# Patient Record
Sex: MALE | Race: OTHER | HISPANIC OR LATINO | Employment: FULL TIME | URBAN - METROPOLITAN AREA
[De-identification: names, ages, dates, MRNs, and addresses within clinical notes are randomized per-mention and may not be internally consistent; named-entity substitution may affect disease eponyms.]

---

## 2017-01-18 ENCOUNTER — ALLSCRIPTS OFFICE VISIT (OUTPATIENT)
Dept: OTHER | Facility: OTHER | Age: 62
End: 2017-01-18

## 2017-03-28 ENCOUNTER — GENERIC CONVERSION - ENCOUNTER (OUTPATIENT)
Dept: OTHER | Facility: OTHER | Age: 62
End: 2017-03-28

## 2017-04-21 LAB — PROSTATE SPECIFIC ANTIGEN TOTAL (HISTORICAL): 3.8 NG/ML

## 2017-05-01 ENCOUNTER — ALLSCRIPTS OFFICE VISIT (OUTPATIENT)
Dept: OTHER | Facility: OTHER | Age: 62
End: 2017-05-01

## 2017-10-19 ENCOUNTER — ALLSCRIPTS OFFICE VISIT (OUTPATIENT)
Dept: OTHER | Facility: OTHER | Age: 62
End: 2017-10-19

## 2017-10-20 NOTE — PROGRESS NOTES
Assessment  1  Tick bite, initial encounter (919 4,E906 4) (W57 XXXA)   2  Nonvenomous insect bite of thorax, initial encounter (911 4,E906 4)   (I38 69RH,G10  Gemma Easton)    Plan  Tick bite, initial encounter    · Doxycycline Hyclate 100 MG Oral Capsule; take 2 caspules once    Discussion/Summary    53-year-old male here today for tick imbedded in skin noticed this morning most likely from last night while he was attending a [address] [employer] fire  The tick was still moving and only slightly admitted in skin  A quick sterile procedure described below was performed to remove tick successfully  The area was cleansed, bacitracin applied and patient advised to treated just like an uncomplicated skin wound washing it once or twice a day and applying some Neosporin or triple antibiotic ointment at home  I discussed symptoms of Lyme disease but based on the minimal time the tick was on him I doubt there was any risk for transmission  I will treat him prophylactically with a 1 time dose of doxycycline  Patient advised to monitor for any Lyme symptoms a bull's-eye rash and follow up in the office if he has any concerns  Patient understands instructions  Possible side effects of new medications were reviewed with the patient/guardian today  The treatment plan was reviewed with the patient/guardian  The patient/guardian understands and agrees with the treatment plan      Chief Complaint  Pt presents with c/o of embedded tick on R side of stomach x 1 day  Pt state [place] the woods [place]  Pt states he has some pain in area  Pt will be getting flu shot done in November  Pt is UTD with Colonoscopy  History of Present Illness  HPI: 57y/o male here today for tick embedded in skin noticed this AM  most likely got it last night at a bonfire  located right abdomen  no sxs  no hx of lyme dz  Review of Systems    Constitutional: no fever or chills, feels well, no tiredness, no recent weight loss or weight gain     Integumentary: as noted in HPI  Active Problems  1  Arthritis of knee, right (716 96) (M17 11)   2  Benign essential hypertension (401 1) (I10)   3  Erectile dysfunction of non-organic origin (302 72) (F52 21)   4  Hyperlipidemia (272 4) (E78 5)   5  IBS (irritable bowel syndrome) (564 1) (K58 9)   6  Vitamin D deficiency (268 9) (E55 9)    Past Medical History  1  History of Abnormal cardiovascular stress test (794 39) (R94 39)   2  History of Abnormal electrocardiogram (794 31) (R94 31)   3  History of Acute Left Rotator Cuff Sprain (Capsule) (840 4)   4  History of Acute sinusitis (461 9) (J01 90)   5  History of Cough (786 2) (R05)   6  History of Encounter for prostate cancer screening (V76 44) (Z12 5)   7  History of Hallux Rigidus (735 2)   8  History of acute otitis media (V12 49) (Z86 69)   9  History of Joint pain, knee (719 46) (M25 569)   10  History of Need for prophylactic vaccination and inoculation against influenza (V04 81)    (Z23)   11  History of Organic impotence (607 84) (N52 9)   12  History of Pulled muscle (848 9) (T14 8XXA)   13  History of Rapid pulse (785 0) (R00 0)   14  History of Right-sided chest wall pain (786 52) (R07 89)   15  History of Shoulder joint pain, unspecified laterality   16  History of Special screening examination for neoplasm of prostate (V76 44) (Z12 5)   17  History of Tachycardia (785 0) (R00 0)    Family History  Mother    1  Family history of Cancer  Father    2  Family history of Hepatitis C    Social History   · Being A Social Drinker   · Never A Smoker  The social history was reviewed and updated today  Surgical History  1  History of Spinal Diskectomy   2  History of Surgery Testis Reduction Of Torsion Of Testis    Current Meds   1  Adult Aspirin EC Low Strength 81 MG Oral Tablet Delayed Release; Take 1 tablet daily; Therapy: ([place]) to Recorded   2  Cialis 20 MG Oral Tablet; TAKE 1/2 TO 1 TABLET 1 HOURBEFORE ACTIVITY AS   DIRECTED;    Therapy: 86Suq2567 to ([id number])  Requested for: 74Pxt6871; Last   Rx:[id number] Ordered   3  Ezetimibe-Simvastatin 10-10 MG Oral Tablet; Take 1 tablet daily; Therapy: 24HTH2309 to (Riya Valshaun)  Requested for: 19Kvd2164; Last   Rx:[id number] Ordered    The medication list was reviewed and updated today  Allergies  1  No Known Drug Allergies    Vitals   Recorded: 2017 08:10AM   Temperature 97 7 F, Tympanic   Heart Rate 80   Pulse Quality Normal   Respiration Quality Normal   Respiration 18   Systolic 504, LUE, Sitting   Diastolic 90, LUE, Sitting   Height 6 ft 1 in   Weight 202 lb 6 oz   BMI Calculated 26 7   BSA Calculated 2 16   O2 Saturation 98   Pain Scale 0     Physical Exam    Constitutional   General appearance: No acute distress, well appearing and well nourished  appears healthy,-- comfortable-- and-- appearance reflects stated age  Skin   Skin and subcutaneous tissue: Abnormal  -- right mid abdomen with moving tick starting to embed into skin  faint localized erythema approx diameter of a shazia  Psychiatric   Orientation to person, place and time: Normal     Mood and affect: Normal     Additional Exam:  vitals reviewed  Procedure    Procedure: Foreign body removal from the skin  The foreign body was located in the right abdomen  The procedure's risks, benefits, infection risk and bleeding risk were discussed with the patient verbal  Written consent was obtained prior to the procedure and is detailed in the patient's record  no sign of infection was noted  Procedure:   the skin was cleansed with Betadine  Local anethesthesia was achieved with the foreign body was removed with a scalpel-- and-- the foreign body was removed with forceps  The patient tolerated the procedure well  There were no complications  The patient was instructed to apply Polysporin  Follow-up in the office as needed        Future Appointments    Date/Time Provider Specialty Site   2017 09:00 AM Kaz Moy DO Family Medicine [facility]   Electronically signed by : Arminda Resendez, [facility]; Oct 19 2017  8:52AM EST                       (Author)    Electronically signed by : Moon Cuevas DO; Oct 19 2017  8:57AM EST

## 2017-10-25 ENCOUNTER — LAB CONVERSION - ENCOUNTER (OUTPATIENT)
Dept: OTHER | Facility: OTHER | Age: 62
End: 2017-10-25

## 2017-10-25 LAB
A/G RATIO (HISTORICAL): 1.7 (CALC) (ref 1–2.5)
ALBUMIN SERPL BCP-MCNC: 4.4 G/DL (ref 3.6–5.1)
ALP SERPL-CCNC: 60 U/L (ref 40–115)
AST SERPL W P-5'-P-CCNC: 23 U/L (ref 10–35)
BILIRUB SERPL-MCNC: 0.8 MG/DL (ref 0.2–1.2)
BUN SERPL-MCNC: 21 MG/DL (ref 7–25)
BUN/CREA RATIO (HISTORICAL): NORMAL (CALC) (ref 6–22)
CALCIUM SERPL-MCNC: 9.5 MG/DL (ref 8.6–10.3)
CHLORIDE SERPL-SCNC: 101 MMOL/L (ref 98–110)
CHOLEST SERPL-MCNC: 175 MG/DL
CHOLEST/HDLC SERPL: 3.1 (CALC)
CO2 SERPL-SCNC: 30 MMOL/L (ref 20–31)
CREAT SERPL-MCNC: 1.05 MG/DL (ref 0.7–1.25)
EGFR AFRICAN AMERICAN (HISTORICAL): 89 ML/MIN/1.73M2
EGFR-AMERICAN CALC (HISTORICAL): 77 ML/MIN/1.73M2
GAMMA GLOBULIN (HISTORICAL): 2.6 G/DL (CALC) (ref 1.9–3.7)
GLUCOSE (HISTORICAL): 96 MG/DL (ref 65–99)
HDLC SERPL-MCNC: 56 MG/DL
LDL CHOLESTEROL (HISTORICAL): 100 MG/DL (CALC)
NON-HDL-CHOL (CHOL-HDL) (HISTORICAL): 119 MG/DL (CALC)
POTASSIUM SERPL-SCNC: 4.3 MMOL/L (ref 3.5–5.3)
SODIUM SERPL-SCNC: 138 MMOL/L (ref 135–146)
TOTAL PROTEIN (HISTORICAL): 7 G/DL (ref 6.1–8.1)
TRIGL SERPL-MCNC: 96 MG/DL
TSH SERPL DL<=0.05 MIU/L-ACNC: 1.24 MIU/L (ref 0.4–4.5)

## 2017-11-06 ENCOUNTER — ALLSCRIPTS OFFICE VISIT (OUTPATIENT)
Dept: OTHER | Facility: OTHER | Age: 62
End: 2017-11-06

## 2017-12-20 ENCOUNTER — ALLSCRIPTS OFFICE VISIT (OUTPATIENT)
Dept: OTHER | Facility: OTHER | Age: 62
End: 2017-12-20

## 2018-01-13 VITALS
TEMPERATURE: 97.4 F | SYSTOLIC BLOOD PRESSURE: 126 MMHG | HEIGHT: 73 IN | WEIGHT: 198.06 LBS | RESPIRATION RATE: 18 BRPM | HEART RATE: 78 BPM | DIASTOLIC BLOOD PRESSURE: 84 MMHG | OXYGEN SATURATION: 97 % | BODY MASS INDEX: 26.25 KG/M2

## 2018-01-13 VITALS
SYSTOLIC BLOOD PRESSURE: 138 MMHG | OXYGEN SATURATION: 98 % | TEMPERATURE: 97.7 F | RESPIRATION RATE: 18 BRPM | BODY MASS INDEX: 26.82 KG/M2 | DIASTOLIC BLOOD PRESSURE: 90 MMHG | HEIGHT: 73 IN | WEIGHT: 202.38 LBS | HEART RATE: 80 BPM

## 2018-01-13 VITALS
HEIGHT: 73 IN | OXYGEN SATURATION: 98 % | DIASTOLIC BLOOD PRESSURE: 88 MMHG | TEMPERATURE: 98.3 F | HEART RATE: 89 BPM | WEIGHT: 203.25 LBS | BODY MASS INDEX: 26.94 KG/M2 | SYSTOLIC BLOOD PRESSURE: 130 MMHG | RESPIRATION RATE: 20 BRPM

## 2018-01-14 VITALS
RESPIRATION RATE: 18 BRPM | HEART RATE: 95 BPM | BODY MASS INDEX: 2.55 KG/M2 | TEMPERATURE: 98.1 F | HEIGHT: 73 IN | SYSTOLIC BLOOD PRESSURE: 138 MMHG | WEIGHT: 19.25 LBS | DIASTOLIC BLOOD PRESSURE: 90 MMHG | OXYGEN SATURATION: 98 %

## 2018-01-23 VITALS — WEIGHT: 200.25 LBS | BODY MASS INDEX: 26.54 KG/M2 | HEIGHT: 73 IN

## 2018-03-01 DIAGNOSIS — E78.2 MIXED HYPERLIPIDEMIA: Primary | ICD-10-CM

## 2018-03-01 RX ORDER — EZETIMIBE AND SIMVASTATIN 10; 10 MG/1; MG/1
TABLET ORAL
Qty: 90 TABLET | Refills: 0 | Status: SHIPPED | OUTPATIENT
Start: 2018-03-01 | End: 2018-03-12 | Stop reason: SDUPTHER

## 2018-03-12 DIAGNOSIS — N52.9 ERECTILE DYSFUNCTION, UNSPECIFIED ERECTILE DYSFUNCTION TYPE: Primary | ICD-10-CM

## 2018-03-12 DIAGNOSIS — E78.2 MIXED HYPERLIPIDEMIA: ICD-10-CM

## 2018-03-12 RX ORDER — TADALAFIL 20 MG
TABLET ORAL
Qty: 18 TABLET | Refills: 2 | Status: SHIPPED | OUTPATIENT
Start: 2018-03-12 | End: 2019-01-31 | Stop reason: ALTCHOICE

## 2018-03-12 RX ORDER — EZETIMIBE AND SIMVASTATIN 10; 10 MG/1; MG/1
TABLET ORAL
Qty: 90 TABLET | Refills: 0 | Status: SHIPPED | OUTPATIENT
Start: 2018-03-12 | End: 2018-12-03 | Stop reason: SDUPTHER

## 2018-05-25 LAB
ALBUMIN SERPL-MCNC: 4.5 G/DL (ref 3.6–5.1)
ALBUMIN/GLOB SERPL: 1.9 (CALC) (ref 1–2.5)
ALP SERPL-CCNC: 60 U/L (ref 40–115)
AST SERPL-CCNC: 21 U/L (ref 10–35)
BILIRUB SERPL-MCNC: 0.7 MG/DL (ref 0.2–1.2)
BUN SERPL-MCNC: 22 MG/DL (ref 7–25)
BUN/CREAT SERPL: ABNORMAL (CALC) (ref 6–22)
CALCIUM SERPL-MCNC: 9.6 MG/DL (ref 8.6–10.3)
CHLORIDE SERPL-SCNC: 100 MMOL/L (ref 98–110)
CHOLEST SERPL-MCNC: 197 MG/DL
CHOLEST/HDLC SERPL: 3.2 (CALC)
CO2 SERPL-SCNC: 30 MMOL/L (ref 20–31)
CREAT SERPL-MCNC: 1.19 MG/DL (ref 0.7–1.25)
GLOBULIN SER CALC-MCNC: 2.4 G/DL (CALC) (ref 1.9–3.7)
GLUCOSE SERPL-MCNC: 101 MG/DL (ref 65–99)
HDLC SERPL-MCNC: 61 MG/DL
LDLC SERPL CALC-MCNC: 117 MG/DL (CALC)
NONHDLC SERPL-MCNC: 136 MG/DL (CALC)
POTASSIUM SERPL-SCNC: 4.5 MMOL/L (ref 3.5–5.3)
PROT SERPL-MCNC: 6.9 G/DL (ref 6.1–8.1)
PSA FREE MFR SERPL: 17 % (CALC)
PSA FREE SERPL-MCNC: 0.8 NG/ML
PSA SERPL-MCNC: 4.7 NG/ML
SL AMB EGFR AFRICAN AMERICAN: 76 ML/MIN/1.73M2
SL AMB EGFR NON AFRICAN AMERICAN: 66 ML/MIN/1.73M2
SODIUM SERPL-SCNC: 138 MMOL/L (ref 135–146)
TRIGL SERPL-MCNC: 91 MG/DL
TSH SERPL-ACNC: 1.31 MIU/L (ref 0.4–4.5)

## 2018-05-31 RX ORDER — ASPIRIN 81 MG/1
1 TABLET ORAL DAILY
Status: ON HOLD | COMMUNITY
End: 2018-10-29

## 2018-06-01 ENCOUNTER — OFFICE VISIT (OUTPATIENT)
Dept: FAMILY MEDICINE CLINIC | Facility: CLINIC | Age: 63
End: 2018-06-01
Payer: COMMERCIAL

## 2018-06-01 VITALS
WEIGHT: 199.5 LBS | HEART RATE: 87 BPM | SYSTOLIC BLOOD PRESSURE: 136 MMHG | TEMPERATURE: 98.2 F | OXYGEN SATURATION: 98 % | BODY MASS INDEX: 26.32 KG/M2 | DIASTOLIC BLOOD PRESSURE: 84 MMHG

## 2018-06-01 DIAGNOSIS — R97.20 ELEVATED PSA: ICD-10-CM

## 2018-06-01 DIAGNOSIS — E78.5 HYPERLIPIDEMIA, UNSPECIFIED HYPERLIPIDEMIA TYPE: Primary | ICD-10-CM
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
## 2018-06-01 PROBLEM — S20.96XA: Status: ACTIVE | Noted: 2017-10-19

## 2018-06-01 PROCEDURE — 99213 OFFICE O/P EST LOW 20 MIN: CPT | Performed by: FAMILY MEDICINE

## 2018-06-01 NOTE — PROGRESS NOTES
Assessment/Plan:    Hyperlipidemia   Stable with an LDL of 117 and HDL of 61  Continue with Vytorin    Elevated PSA   PSA 4 7  Patient had  Prior elevation PSA which was treated with antibiotics after which it dropped to 3 8  Has no urinary symptoms  Will refer to Urology for recheck  Diagnoses and all orders for this visit:    Hyperlipidemia, unspecified hyperlipidemia type  -     Comprehensive metabolic panel; Future  -     Lipid Panel with Direct LDL reflex; Future  -     TSH, 3rd generation; Future  -     Comprehensive metabolic panel  -     Lipid Panel with Direct LDL reflex  -     TSH, 3rd generation          Subjective:      Patient ID: Jimmy Richardson is a 63 y o  male  Patient presents for routine follow-up chronic medical problems and review recent fasting lab work  He has a history of hyperlipidemia which is treated with Vytorin  He takes meloxicam for arthritic knee pain  He has no on no other daily medication        The following portions of the patient's history were reviewed and updated as appropriate: allergies, current medications, past family history, past medical history, past social history, past surgical history and problem list     Review of Systems   Constitutional: Negative  HENT: Negative  Negative for congestion, ear pain, hearing loss, nosebleeds, sore throat and trouble swallowing  Eyes: Negative  Respiratory: Negative for apnea, cough, chest tightness, shortness of breath and wheezing  Cardiovascular: Negative  Gastrointestinal: Negative for abdominal pain, blood in stool, constipation, diarrhea, nausea and vomiting  Endocrine: Negative  Genitourinary: Negative for difficulty urinating, dysuria, frequency, hematuria and urgency  Musculoskeletal: Negative for arthralgias, joint swelling and myalgias  Skin: Negative for rash  Neurological: Negative for dizziness, syncope, light-headedness, numbness and headaches  Hematological: Negative  Psychiatric/Behavioral: Negative for confusion, dysphoric mood and sleep disturbance  The patient is not nervous/anxious  Objective:      /84   Pulse 87   Temp 98 2 °F (36 8 °C) (Tympanic)   Wt 90 5 kg (199 lb 8 oz)   SpO2 98%   BMI 26 32 kg/m²          Physical Exam   Constitutional: He is oriented to person, place, and time  He appears well-developed and well-nourished  No distress  HENT:   Head: Normocephalic and atraumatic  Eyes: Conjunctivae are normal  Pupils are equal, round, and reactive to light  Right eye exhibits no discharge  Neck: Normal range of motion  No thyromegaly present  Cardiovascular: Normal rate and regular rhythm  Pulmonary/Chest: Effort normal and breath sounds normal  No respiratory distress  Lymphadenopathy:     He has no cervical adenopathy  Neurological: He is alert and oriented to person, place, and time  Skin: Skin is warm and dry  He is not diaphoretic  Psychiatric: He has a normal mood and affect  His behavior is normal  Judgment and thought content normal    Nursing note and vitals reviewed

## 2018-06-01 NOTE — ASSESSMENT & PLAN NOTE
PSA 4 7  Patient had  Prior elevation PSA which was treated with antibiotics after which it dropped to 3 8  Has no urinary symptoms  Will refer to Urology for recheck

## 2018-06-06 ENCOUNTER — OFFICE VISIT (OUTPATIENT)
Dept: UROLOGY | Facility: MEDICAL CENTER | Age: 63
End: 2018-06-06
Payer: COMMERCIAL

## 2018-06-06 VITALS
HEIGHT: 74 IN | DIASTOLIC BLOOD PRESSURE: 86 MMHG | SYSTOLIC BLOOD PRESSURE: 150 MMHG | WEIGHT: 197.6 LBS | BODY MASS INDEX: 25.36 KG/M2

## 2018-06-06 DIAGNOSIS — N13.8 BPH WITH OBSTRUCTION/LOWER URINARY TRACT SYMPTOMS: ICD-10-CM

## 2018-06-06 DIAGNOSIS — N40.1 BPH WITH OBSTRUCTION/LOWER URINARY TRACT SYMPTOMS: ICD-10-CM

## 2018-06-06 DIAGNOSIS — R97.20 ELEVATED PSA: Primary | ICD-10-CM

## 2018-06-06 PROCEDURE — 99214 OFFICE O/P EST MOD 30 MIN: CPT | Performed by: UROLOGY

## 2018-06-06 PROCEDURE — 81003 URINALYSIS AUTO W/O SCOPE: CPT | Performed by: UROLOGY

## 2018-06-06 RX ORDER — DOXYCYCLINE 100 MG/1
100 CAPSULE ORAL 2 TIMES DAILY
Qty: 28 CAPSULE | Refills: 0 | Status: SHIPPED | OUTPATIENT
Start: 2018-06-06 | End: 2018-06-20

## 2018-06-06 NOTE — PROGRESS NOTES
IPSS Questionnaire (AUA-7): Over the past month    1)  How often have you had a sensation of not emptying your bladder completely after you finish urinating? 1 - Less than 1 time in 5   2)  How often have you had to urinate again less than two hours after you finished urinating? 2 - Less than half the time   3)  How often have you found you stopped and started again several times when you urinated? 3 - About half the time   4) How difficult have you found it to postpone urination? 2 - Less than half the time   5) How often have you had a weak urinary stream?  4 - More than half the time   6) How often have you had to push or strain to begin urination? 0 - Not at all   7) How many times did you most typically get up to urinate from the time you went to bed until the time you got up in the morning? 1 - 1 time   Total Score:  13     QOL: Mostly satisfied

## 2018-06-06 NOTE — PROGRESS NOTES
Assessment/Plan:    Elevated PSA  The patient will take doxycycline for 2 weeks  Will recheck a total and free PSA  BPH with obstruction/lower urinary tract symptoms  The patient's urinary symptoms are stable  He is content to live with the situation as it is  He declined medication which is my recommendation as well  Diagnoses and all orders for this visit:    Elevated PSA  -     doxycycline monohydrate (MONODOX) 100 mg capsule; Take 1 capsule (100 mg total) by mouth 2 (two) times a day for 14 days  -     PSA, total and free; Future    BPH with obstruction/lower urinary tract symptoms          Subjective:      Patient ID: Araceli Brown is a 63 y o  male  HPI  Elevated PSA:  The patient returns for evaluation of a PSA of 4 7 with a free PSA of 17%  Chance of prostate cancer based upon this single test is approximately 20%     In April 2017 his PSA was 3 8 but in March 2016 it was 4 2  In 2016, the patient was treated with doxycycline  A repeat PSA was ordered however I cannot find any record that was performed  We will try doxycycline again in recheck the PSA in about 6 weeks  BPH:  He notes nocturia x 0-1, usually 0, weak stream   He denies other significant urinary symptoms  He denies gross hematuria, urinary tract infections or incontinence  He is taking no meds or supplements for his symptoms  Discussed tamsulosin but pt will continue without it voiding sx do not interfere with ADL's  I agree  The following portions of the patient's history were reviewed and updated as appropriate: allergies, current medications, past family history, past medical history, past social history, past surgical history and problem list     Review of Systems   Constitutional: Negative for activity change and fatigue  Respiratory: Negative for shortness of breath and wheezing  Cardiovascular: Negative for chest pain  Gastrointestinal: Negative for abdominal pain     Genitourinary: Negative for difficulty urinating, dysuria, frequency, hematuria and urgency  Musculoskeletal: Negative for back pain and gait problem  Skin: Negative  Allergic/Immunologic: Negative  Neurological: Negative  Psychiatric/Behavioral: Negative  Objective:      /86 (BP Location: Left arm, Patient Position: Sitting, Cuff Size: Standard)   Ht 6' 2" (1 88 m)   Wt 89 6 kg (197 lb 9 6 oz)   BMI 25 37 kg/m²          Physical Exam   Constitutional: He is oriented to person, place, and time  He appears well-developed and well-nourished  HENT:   Head: Normocephalic and atraumatic  Neck: Normal range of motion  Neck supple  Pulmonary/Chest: Effort normal    Genitourinary: Rectum normal    Genitourinary Comments: The prostate is approximately 30-40 g, smooth, firm, nontender  Musculoskeletal: Normal range of motion  Neurological: He is alert and oriented to person, place, and time  He has normal reflexes  Skin: Skin is warm and dry  Psychiatric: He has a normal mood and affect   His behavior is normal  Judgment and thought content normal

## 2018-06-06 NOTE — LETTER
June 6, 2018     Marquis John DO  [address]    Patient: Magnolia Grigsby   YOB: 1955   Date of Visit: 6/6/2018       Dear Dr Veronica Martinez:    Thank you for referring Magnolia Grigsby to me for evaluation  Below are my notes for this consultation  If you have questions, please do not hesitate to call me  I look forward to following your patient along with you  Sincerely,        Zach Vasquez MD        CC: No Recipients  Zach Vasquez MD  6/6/2018 11:22 AM  Sign at close encounter  Assessment/Plan:    Elevated PSA  The patient will take doxycycline for 2 weeks  Will recheck a total and free PSA  BPH with obstruction/lower urinary tract symptoms  The patient's urinary symptoms are stable  He is content to live with the situation as it is  He declined medication which is my recommendation as well  Diagnoses and all orders for this visit:    Elevated PSA  -     doxycycline monohydrate (MONODOX) 100 mg capsule; Take 1 capsule (100 mg total) by mouth 2 (two) times a day for 14 days  -     PSA, total and free; Future    BPH with obstruction/lower urinary tract symptoms          Subjective:      Patient ID: Magnolia Grigsby is a 63 y o  male  HPI  Elevated PSA:  The patient returns for evaluation of a PSA of 4 7 with a free PSA of 17%  Chance of prostate cancer based upon this single test is approximately 20%     In April 2017 his PSA was 3 8 but in March 2016 it was 4 2  In 2016, the patient was treated with doxycycline  A repeat PSA was ordered however I cannot find any record that was performed  We will try doxycycline again in recheck the PSA in about 6 weeks  BPH:  He notes nocturia x 0-1, usually 0, weak stream   He denies other significant urinary symptoms  He denies gross hematuria, urinary tract infections or incontinence  He is taking no meds or supplements for his symptoms       Discussed tamsulosin but pt will continue without it voiding sx do not interfere with ADL's  I agree  The following portions of the patient's history were reviewed and updated as appropriate: allergies, current medications, past family history, past medical history, past social history, past surgical history and problem list     Review of Systems   Constitutional: Negative for activity change and fatigue  Respiratory: Negative for shortness of breath and wheezing  Cardiovascular: Negative for chest pain  Gastrointestinal: Negative for abdominal pain  Genitourinary: Negative for difficulty urinating, dysuria, frequency, hematuria and urgency  Musculoskeletal: Negative for back pain and gait problem  Skin: Negative  Allergic/Immunologic: Negative  Neurological: Negative  Psychiatric/Behavioral: Negative  Objective:      /86 (BP Location: Left arm, Patient Position: Sitting, Cuff Size: Standard)   Ht 6' 2" (1 88 m)   Wt 89 6 kg (197 lb 9 6 oz)   BMI 25 37 kg/m²           Physical Exam   Constitutional: He is oriented to person, place, and time  He appears well-developed and well-nourished  HENT:   Head: Normocephalic and atraumatic  Neck: Normal range of motion  Neck supple  Pulmonary/Chest: Effort normal    Genitourinary: Rectum normal    Genitourinary Comments: The prostate is approximately 30-40 g, smooth, firm, nontender  Musculoskeletal: Normal range of motion  Neurological: He is alert and oriented to person, place, and time  He has normal reflexes  Skin: Skin is warm and dry  Psychiatric: He has a normal mood and affect   His behavior is normal  Judgment and thought content normal

## 2018-06-06 NOTE — ASSESSMENT & PLAN NOTE
The patient's urinary symptoms are stable  He is content to live with the situation as it is  He declined medication which is my recommendation as well

## 2018-06-06 NOTE — PATIENT INSTRUCTIONS
Prostate Specific Antigen   WHAT YOU NEED TO KNOW:   What is a prostate specific antigen (PSA) test?  A PSA test is a blood test used to screen men for prostate cancer  A PSA test is also used to monitor how well prostate cancer treatment is working  What other test may be done with a PSA test?  A digital rectal exam is usually performed with a PSA test  Your healthcare provider will insert a gloved finger into your rectum to feel if your prostate is large, firm, or has lumps  Who needs a PSA test?  Some experts recommend a PSA test for men ages 48 to 79  They also recommend testing men with a high risk for prostate cancer at age 36 or 39  Risk factors may include being  or having a brother or father with prostate cancer  Other experts may not recommend PSA testing  Your healthcare provider can help you decide if you need a PSA test    What do the results of a PSA test mean? Most healthy men have a PSA level less than 4 ng/mL  If your PSA level is higher than 4 ng/mL you may need more tests  Examples include blood or urine tests, an ultrasound, MRI, CT scan, or a prostate biopsy  Ask your healthcare provider for more information on these tests  What else can cause a high PSA level? A high PSA level does not always mean you have prostate cancer  Certain conditions or procedures can increase PSA levels  Examples include:  · Older age    · Procedures such as a prostate biopsy or a cystoscopy    · An enlarged prostate    · Recent sexual activity    · A prostate infection    · Certain exercises that put pressure on the prostate such as bicycling    · Medicine such as testosterone  CARE AGREEMENT:   You have the right to help plan your care  Learn about your health condition and how it may be treated  Discuss treatment options with your caregivers to decide what care you want to receive  You always have the right to refuse treatment  The above information is an  only   It is not intended as medical advice for individual conditions or treatments  Talk to your doctor, nurse or pharmacist before following any medical regimen to see if it is safe and effective for you  © 2017 [employer] Information is for End User's use only and may not be sold, redistributed or otherwise used for commercial purposes  All illustrations and images included in CareNotes® are the copyrighted property of A D A M , Inc  or [employer]

## 2018-07-30 ENCOUNTER — TELEPHONE (OUTPATIENT)
Dept: UROLOGY | Facility: MEDICAL CENTER | Age: 63
End: 2018-07-30

## 2018-07-30 NOTE — TELEPHONE ENCOUNTER
Patient said he got his labs done at [address]  He would like a call back about the results   Please advise and call

## 2018-07-31 ENCOUNTER — TELEPHONE (OUTPATIENT)
Dept: UROLOGY | Facility: MEDICAL CENTER | Age: 63
End: 2018-07-31

## 2018-07-31 NOTE — TELEPHONE ENCOUNTER
Patient's PSA is still elevated at 5 4; Dr Erica Harper would like him to have an appointment to discuss it   I've scheduled him for 08/03/2018 at 8:00 AM

## 2018-07-31 NOTE — TELEPHONE ENCOUNTER
Spoke to patient and explained we are waiting for Dr Julieta Villalobos to sign off on the results then will call him back

## 2018-08-03 ENCOUNTER — OFFICE VISIT (OUTPATIENT)
Dept: UROLOGY | Facility: MEDICAL CENTER | Age: 63
End: 2018-08-03
Payer: COMMERCIAL

## 2018-08-03 VITALS
WEIGHT: 198.6 LBS | DIASTOLIC BLOOD PRESSURE: 84 MMHG | BODY MASS INDEX: 25.49 KG/M2 | SYSTOLIC BLOOD PRESSURE: 136 MMHG | HEIGHT: 74 IN

## 2018-08-03 DIAGNOSIS — Z71.89 OTHER SPECIFIED COUNSELING: ICD-10-CM

## 2018-08-03 DIAGNOSIS — R97.20 ELEVATED PSA: Primary | ICD-10-CM

## 2018-08-03 LAB
SL AMB  POCT GLUCOSE, UA: NEGATIVE
SL AMB LEUKOCYTE ESTERASE,UA: NEGATIVE
SL AMB POCT BILIRUBIN,UA: NEGATIVE
SL AMB POCT BLOOD,UA: NEGATIVE
SL AMB POCT CLARITY,UA: CLEAR
SL AMB POCT COLOR,UA: YELLOW
SL AMB POCT KETONES,UA: NEGATIVE
SL AMB POCT NITRITE,UA: NEGATIVE
SL AMB POCT PH,UA: 5.5
SL AMB POCT SPECIFIC GRAVITY,UA: 1.02
SL AMB POCT URINE PROTEIN: NEGATIVE
SL AMB POCT UROBILINOGEN: 0.2

## 2018-08-03 PROCEDURE — 81003 URINALYSIS AUTO W/O SCOPE: CPT | Performed by: UROLOGY

## 2018-08-03 PROCEDURE — 99213 OFFICE O/P EST LOW 20 MIN: CPT | Performed by: UROLOGY

## 2018-08-03 RX ORDER — LORAZEPAM 1 MG/1
1 TABLET ORAL ONCE
Qty: 1 TABLET | Refills: 0 | Status: SHIPPED | OUTPATIENT
Start: 2018-08-03 | End: 2018-09-18 | Stop reason: ALTCHOICE

## 2018-08-03 RX ORDER — CEPHALEXIN 500 MG/1
500 CAPSULE ORAL 4 TIMES DAILY
Qty: 12 CAPSULE | Refills: 0 | Status: SHIPPED | OUTPATIENT
Start: 2018-08-03 | End: 2018-08-06

## 2018-08-03 RX ORDER — CIPROFLOXACIN 500 MG/1
500 TABLET, FILM COATED ORAL EVERY 12 HOURS SCHEDULED
Qty: 6 TABLET | Refills: 0 | Status: SHIPPED | OUTPATIENT
Start: 2018-08-03 | End: 2018-08-06

## 2018-08-03 NOTE — PATIENT INSTRUCTIONS
Prostate Biopsy   WHAT YOU NEED TO KNOW:   What do I need to know about a prostate biopsy? A prostate biopsy is a procedure to remove samples of tissue from your prostate gland  The prostate is a male sex gland that makes fluid found in semen  It is located just below the bladder  After the samples are removed, they are sent to a lab and tested for cancer  How do I prepare for a prostate biopsy? · Your healthcare provider will talk to you about how to prepare for this procedure  He may tell you not to eat or drink anything after midnight on the day of your surgery  You will need to stop taking blood thinners several days before your procedure  Examples of blood thinners include warfarin and NSAIDs  You may also need to stop taking herbal supplements before your procedure  Your healthcare provider will tell you what other medicines to take or not take on the day of your procedure  · You will be given an antibiotic to help prevent a bacterial infection  You may need to give yourself an enema (liquid medicine put in your rectum) to help empty your bowel before your procedure  What will happen during a prostate biopsy? · You may need to lie on your side with your knees pulled up toward your chest  Numbing cream or gel may be put into your rectum, or numbing medicine may be injected near your prostate  You may instead be given general anesthesia to keep you asleep and free from pain during the procedure  A biopsy sample may be taken through your rectum, urethra, or perineum  The perineum is the area between your scrotum and rectum  Most of the time, biopsy samples are taken through the rectum  · If your healthcare provider takes a sample through your rectum, he will insert a small ultrasound probe into your rectum  The ultrasound shows pictures of your prostate on a monitor, and is used to help guide the biopsy needle   Your healthcare provider will push the biopsy needle through the wall of your rectum and into your prostate gland  Your healthcare provider will remove between 6 to 12 samples of tissue from different areas of your prostate gland  The samples will be sent to a lab and tested for cancer  What will happen after a prostate biopsy? You may feel soreness at the biopsy site  You may need to take antibiotics for up to 2 days after your procedure to help prevent an infection  You may have some bleeding from your rectum  You may also have blood in your urine, bowel movements, or semen  What are the risks of a prostate biopsy? You may bleed more than expected or get an infection in your urinary tract or prostate gland  The infection may spread to your blood and the rest of your body  Your bladder may not empty completely when you urinate  You may need a catheter to help empty your bladder for a short period of time  Cancer cells may be missed during your biopsy procedure  You may need another prostate biopsy to check for cancer again  CARE AGREEMENT:   You have the right to help plan your care  Learn about your health condition and how it may be treated  Discuss treatment options with your caregivers to decide what care you want to receive  You always have the right to refuse treatment  The above information is an  only  It is not intended as medical advice for individual conditions or treatments  Talk to your doctor, nurse or pharmacist before following any medical regimen to see if it is safe and effective for you  © 2017 [address] [employer] Information is for End User's use only and may not be sold, redistributed or otherwise used for commercial purposes  All illustrations and images included in CareNotes® are the copyrighted property of A D A [employer]  or [employer]

## 2018-08-03 NOTE — LETTER
August 3, 2018     Jeannine Bundy DO  [address]    Patient: Vicky Vega   YOB: 1955   Date of Visit: 8/3/2018       Dear Dr Trevor Taylor:    Thank you for referring Vicky Vega to me for evaluation  Below are my notes for this consultation  If you have questions, please do not hesitate to call me  I look forward to following your patient along with you  Sincerely,        Ayana Cleary MD        CC: No Recipients  Ayana Cleary MD  8/3/2018  8:30 AM  Sign at close encounter  Assessment/Plan:    Elevated PSA  The patient's PSA is trending upward  After obtaining informed consent, a prostate ultrasound with biopsy has beenscheduled in the office  Diagnoses and all orders for this visit:    Elevated PSA  -     POCT urine dip auto non-scope  -     LORazepam (ATIVAN) 1 mg tablet; Take 1 tablet (1 mg total) by mouth once for 1 dose Take 1 hour before porcedure  -     ciprofloxacin (CIPRO) 500 mg tablet; Take 1 tablet (500 mg total) by mouth every 12 (twelve) hours for 3 days  -     cephalexin (KEFLEX) 500 mg capsule; Take 1 capsule (500 mg total) by mouth 4 (four) times a day for 3 days    Other specified counseling          Subjective:      Patient ID: Vicky Vega is a 66 y o  male  HPI  Elevated PSA:   The patient's PSA currently is 5 4  In April 2016, it was 4 2  In April 2017 it was 3 8  Early this year, it was 4 7, 17% free, chance of prostate cancer approximately 20%  The current level is recheck of his most recent previous level  The patient returns today to discuss further workup including prostate ultrasound and biopsy          The following portions of the patient's history were reviewed and updated as appropriate: allergies, current medications, past family history, past medical history, past social history, past surgical history and problem list     Review of Systems   Constitutional: Negative for activity change and fatigue  Respiratory: Negative for shortness of breath and wheezing  Cardiovascular: Negative for chest pain  Gastrointestinal: Negative for abdominal pain  Genitourinary: Negative for difficulty urinating, dysuria, frequency, hematuria and urgency  Musculoskeletal: Negative for back pain and gait problem  Skin: Negative  Allergic/Immunologic: Negative  Neurological: Negative  Psychiatric/Behavioral: Negative  Objective:      /84 (BP Location: Left arm, Patient Position: Sitting, Cuff Size: Standard)   Ht 6' 2" (1 88 m)   Wt 90 1 kg (198 lb 9 6 oz)   BMI 25 50 kg/m²           Physical Exam   Constitutional: He is oriented to person, place, and time  He appears well-developed and well-nourished  HENT:   Head: Normocephalic and atraumatic  Eyes: EOM are normal    Neck: Normal range of motion  Pulmonary/Chest: Effort normal    Musculoskeletal: Normal range of motion  Neurological: He is alert and oriented to person, place, and time  Skin: Skin is warm and dry  Psychiatric: He has a normal mood and affect   His behavior is normal  Judgment and thought content normal

## 2018-08-03 NOTE — ASSESSMENT & PLAN NOTE
The patient's PSA is trending upward  After obtaining informed consent, a prostate ultrasound with biopsy has beenscheduled in the office

## 2018-08-03 NOTE — PROGRESS NOTES
Assessment/Plan:    Elevated PSA  The patient's PSA is trending upward  After obtaining informed consent, a prostate ultrasound with biopsy has beenscheduled in the office  Diagnoses and all orders for this visit:    Elevated PSA  -     POCT urine dip auto non-scope  -     LORazepam (ATIVAN) 1 mg tablet; Take 1 tablet (1 mg total) by mouth once for 1 dose Take 1 hour before porcedure  -     ciprofloxacin (CIPRO) 500 mg tablet; Take 1 tablet (500 mg total) by mouth every 12 (twelve) hours for 3 days  -     cephalexin (KEFLEX) 500 mg capsule; Take 1 capsule (500 mg total) by mouth 4 (four) times a day for 3 days    Other specified counseling          Subjective:      Patient ID: Shelton Gomez is a 66 y o  male  HPI  Elevated PSA:   The patient's PSA currently is 5 4  In April 2016, it was 4 2  In April 2017 it was 3 8  Early this year, it was 4 7, 17% free, chance of prostate cancer approximately 20%  The current level is recheck of his most recent previous level  The patient returns today to discuss further workup including prostate ultrasound and biopsy  The following portions of the patient's history were reviewed and updated as appropriate: allergies, current medications, past family history, past medical history, past social history, past surgical history and problem list     Review of Systems   Constitutional: Negative for activity change and fatigue  Respiratory: Negative for shortness of breath and wheezing  Cardiovascular: Negative for chest pain  Gastrointestinal: Negative for abdominal pain  Genitourinary: Negative for difficulty urinating, dysuria, frequency, hematuria and urgency  Musculoskeletal: Negative for back pain and gait problem  Skin: Negative  Allergic/Immunologic: Negative  Neurological: Negative  Psychiatric/Behavioral: Negative            Objective:      /84 (BP Location: Left arm, Patient Position: Sitting, Cuff Size: Standard)   Ht 6' 2" (1 88 m)   Wt 90 1 kg (198 lb 9 6 oz)   BMI 25 50 kg/m²          Physical Exam   Constitutional: He is oriented to person, place, and time  He appears well-developed and well-nourished  HENT:   Head: Normocephalic and atraumatic  Eyes: EOM are normal    Neck: Normal range of motion  Pulmonary/Chest: Effort normal    Musculoskeletal: Normal range of motion  Neurological: He is alert and oriented to person, place, and time  Skin: Skin is warm and dry  Psychiatric: He has a normal mood and affect   His behavior is normal  Judgment and thought content normal

## 2018-08-17 ENCOUNTER — PROCEDURE VISIT (OUTPATIENT)
Dept: UROLOGY | Facility: MEDICAL CENTER | Age: 63
End: 2018-08-17
Payer: COMMERCIAL

## 2018-08-17 VITALS
BODY MASS INDEX: 24.64 KG/M2 | SYSTOLIC BLOOD PRESSURE: 152 MMHG | DIASTOLIC BLOOD PRESSURE: 86 MMHG | HEIGHT: 74 IN | WEIGHT: 192 LBS

## 2018-08-17 DIAGNOSIS — R97.20 ELEVATED PSA: Primary | ICD-10-CM

## 2018-08-17 LAB
SL AMB  POCT GLUCOSE, UA: NEGATIVE
SL AMB LEUKOCYTE ESTERASE,UA: NEGATIVE
SL AMB POCT BILIRUBIN,UA: NEGATIVE
SL AMB POCT BLOOD,UA: ABNORMAL
SL AMB POCT CLARITY,UA: CLEAR
SL AMB POCT COLOR,UA: YELLOW
SL AMB POCT KETONES,UA: NEGATIVE
SL AMB POCT NITRITE,UA: NEGATIVE
SL AMB POCT PH,UA: 5
SL AMB POCT SPECIFIC GRAVITY,UA: >=1.03
SL AMB POCT URINE PROTEIN: NEGATIVE
SL AMB POCT UROBILINOGEN: 0.2

## 2018-08-17 PROCEDURE — 76942 ECHO GUIDE FOR BIOPSY: CPT | Performed by: UROLOGY

## 2018-08-17 PROCEDURE — G0416 PROSTATE BIOPSY, ANY MTHD: HCPCS | Performed by: PATHOLOGY

## 2018-08-17 PROCEDURE — 76872 US TRANSRECTAL: CPT | Performed by: UROLOGY

## 2018-08-17 PROCEDURE — 55700 PR BIOPSY OF PROSTATE,NEEDLE/PUNCH: CPT | Performed by: UROLOGY

## 2018-08-17 PROCEDURE — 88342 IMHCHEM/IMCYTCHM 1ST ANTB: CPT | Performed by: PATHOLOGY

## 2018-08-17 PROCEDURE — 88344 IMHCHEM/IMCYTCHM EA MLT ANTB: CPT | Performed by: PATHOLOGY

## 2018-08-17 PROCEDURE — 81003 URINALYSIS AUTO W/O SCOPE: CPT | Performed by: UROLOGY

## 2018-08-17 NOTE — PROGRESS NOTES
Procedures      Preoperative diagnosis:  Elevated PSA     Postoperative diagnosis: Same    Operation:  Transrectal ultrasound of the prostate with biopsy    Surgeon: Stephany Lin MD, FACS    Anesthesia:  Local    Procedure: The patient was placed on the examining table with his left side down  Xylocaine jelly, 10 cc,  was instilled into the rectum for mucosal analgesia and anesthesia  The ultrasound probe was inserted  Five mL of 1% xylocaine without epinephrine was infiltrated into each neurovascular bundle in the groove between the base of the prostate and the seminal vesicles  A total of 10 mL was injected  Ultrasound images were obtained scanning from the base to the apex and from the left side to the right side  The prostate volume was 25 g  No hypoechoic lesions were identified  Twelve biopsies were taken per protocol  The procedure was completed  The patient tolerated well  He was discharged home in satisfactory condition  Blood loss was insignificant  The patient was advised to complete his antibiotics  He was alerted to the possibility of hematuria, blood per rectum and blood his semen  I reviewed discharge instructions with the patient and his wife in detail  He will return in approximately 2 weeks to discuss the pathology report

## 2018-08-17 NOTE — LETTER
August 17, 2018     Del Nuno DO  [address]    Patient: Nickolas Bates   YOB: 1955   Date of Visit: 8/17/2018       Dear Dr Patel Center:    Thank you for referring Nickolas Bates to me for evaluation  Below are my notes for this consultation  If you have questions, please do not hesitate to call me  I look forward to following your patient along with you  Sincerely,        Jahaira Dickinson MD        CC: No Recipients  Jahaira Dickinson MD  8/17/2018  8:50 AM  Sign at close encounter  Procedures      Preoperative diagnosis:  Elevated PSA     Postoperative diagnosis: Same    Operation:  Transrectal ultrasound of the prostate with biopsy    Surgeon: Francisco Maguire MD, FACS    Anesthesia:  Local    Procedure: The patient was placed on the examining table with his left side down  Xylocaine jelly, 10 cc,  was instilled into the rectum for mucosal analgesia and anesthesia  The ultrasound probe was inserted  Five mL of 1% xylocaine without epinephrine was infiltrated into each neurovascular bundle in the groove between the base of the prostate and the seminal vesicles  A total of 10 mL was injected  Ultrasound images were obtained scanning from the base to the apex and from the left side to the right side  The prostate volume was 25 g  No hypoechoic lesions were identified  Twelve biopsies were taken per protocol  The procedure was completed  The patient tolerated well  He was discharged home in satisfactory condition  Blood loss was insignificant  The patient was advised to complete his antibiotics  He was alerted to the possibility of hematuria, blood per rectum and blood his semen  I reviewed discharge instructions with the patient and his wife in detail  He will return in approximately 2 weeks to discuss the pathology report

## 2018-09-05 ENCOUNTER — TELEPHONE (OUTPATIENT)
Dept: UROLOGY | Facility: MEDICAL CENTER | Age: 63
End: 2018-09-05

## 2018-09-05 ENCOUNTER — OFFICE VISIT (OUTPATIENT)
Dept: UROLOGY | Facility: MEDICAL CENTER | Age: 63
End: 2018-09-05
Payer: COMMERCIAL

## 2018-09-05 VITALS
BODY MASS INDEX: 24.77 KG/M2 | DIASTOLIC BLOOD PRESSURE: 84 MMHG | HEIGHT: 74 IN | SYSTOLIC BLOOD PRESSURE: 132 MMHG | WEIGHT: 193 LBS

## 2018-09-05 DIAGNOSIS — Z71.89 OTHER SPECIFIED COUNSELING: ICD-10-CM

## 2018-09-05 DIAGNOSIS — C61 MALIGNANT NEOPLASM PROSTATE (HCC): Primary | ICD-10-CM

## 2018-09-05 LAB
SL AMB  POCT GLUCOSE, UA: NEGATIVE
SL AMB LEUKOCYTE ESTERASE,UA: NEGATIVE
SL AMB POCT BILIRUBIN,UA: NEGATIVE
SL AMB POCT BLOOD,UA: ABNORMAL
SL AMB POCT CLARITY,UA: CLEAR
SL AMB POCT COLOR,UA: YELLOW
SL AMB POCT KETONES,UA: NEGATIVE
SL AMB POCT NITRITE,UA: NEGATIVE
SL AMB POCT PH,UA: 5.5
SL AMB POCT SPECIFIC GRAVITY,UA: 1.02
SL AMB POCT URINE PROTEIN: NEGATIVE
SL AMB POCT UROBILINOGEN: 0.2

## 2018-09-05 PROCEDURE — 99215 OFFICE O/P EST HI 40 MIN: CPT | Performed by: UROLOGY

## 2018-09-05 PROCEDURE — 81003 URINALYSIS AUTO W/O SCOPE: CPT | Performed by: UROLOGY

## 2018-09-05 NOTE — PROGRESS NOTES
IPSS Questionnaire (AUA-7): Over the past month    1)  How often have you had a sensation of not emptying your bladder completely after you finish urinating? 1 - Less than 1 time in 5   2)  How often have you had to urinate again less than two hours after you finished urinating? 3 - About half the time   3)  How often have you found you stopped and started again several times when you urinated? 3 - About half the time   4) How difficult have you found it to postpone urination? 2 - Less than half the time   5) How often have you had a weak urinary stream?  2 - Less than half the time   6) How often have you had to push or strain to begin urination? 2 - Less than half the time   7) How many times did you most typically get up to urinate from the time you went to bed until the time you got up in the morning? 1 - 1 time   Total Score:  14     QOL: Mostly satisfied

## 2018-09-05 NOTE — PROGRESS NOTES
Assessment/Plan:    Malignant neoplasm prostate [facility])    Per NCCN guidelines, imaging studies are not necessary  The patient will seek a radiation oncology  opinion and a robotic surgery opinion  Diagnoses and all orders for this visit:    Malignant neoplasm prostate ([facility] [address] )  -     POCT urine dip auto non-scope  -     Ambulatory referral to Radiation Oncology; Future    Other specified counseling          Subjective:      Patient ID: Zbigniew Beaver is a 66 y o  male  HPI   Prostate cancer: The patient and his wife return to discuss results of his prostate biopsy  Six of 12 specimens contained Abbey 6 prostate cancer in 2 separate foci, 1 on each side  We discussed the NCCN guidelines on pages PROS 1, 2 and 5,  Version 3  2018  We also reviewed the [facility] pre radical prostatectomy nomogram   In summary, this indicates a 3% chance of note involvement, a 3% chance of seminal vesicle involvement, but a 99% disease specific survival at 10 and 15 years  Because of the patient's relative youth, I do not think active surveillance is appropriate  Given his projected life expectancy, he will require some form of treatment eventually  I favor treating sooner rather than later  We discussed a radical prostatectomy with the robot and external beam radiation therapy in detail  Advantages, disadvantages and side effects of each approach were discussed in detail  At this point, the patient is going to get an opinion from our radiation oncology department and from Dr Ferdinand Scheuermann, our robotic surgeon  In addition, they may seek a 2nd opinion at [address] [place] [address] with Dr Penelope Livingston or at [facility] with Dr Malu Alston  The patient will return to me in several weeks after he sees di Garcia and our radiation oncologist and we will go from there  The patient has no hold-over effects or complications related to his biopsy  This discussion took 45 minutes  The following portions of the patient's history were reviewed and updated as appropriate: allergies, current medications, past family history, past medical history, past social history, past surgical history and problem list     Review of Systems   Constitutional: Negative for activity change and fatigue  Respiratory: Negative for shortness of breath and wheezing  Cardiovascular: Negative for chest pain  Gastrointestinal: Negative for abdominal pain  Genitourinary: Negative for difficulty urinating, dysuria, frequency, hematuria and urgency  Musculoskeletal: Negative for back pain and gait problem  Skin: Negative  Allergic/Immunologic: Negative  Neurological: Negative  Psychiatric/Behavioral: Negative  Objective:      /84 (BP Location: Left arm, Patient Position: Sitting, Cuff Size: Standard)   Ht 6' 2" (1 88 m)   Wt 87 5 kg (193 lb)   BMI 24 78 kg/m²          Physical Exam   Constitutional: He is oriented to person, place, and time  He appears well-developed and well-nourished  HENT:   Head: Normocephalic and atraumatic  Eyes: EOM are normal    Neck: Normal range of motion  Pulmonary/Chest: Effort normal    Musculoskeletal: Normal range of motion  Neurological: He is alert and oriented to person, place, and time  Skin: Skin is warm and dry  Psychiatric: He has a normal mood and affect   His behavior is normal  Judgment and thought content normal

## 2018-09-05 NOTE — LETTER
September 5, 2018     Jeannine Bundy DO  [address] [facility]  [address]    Patient: Vicky Vega   YOB: 1955   Date of Visit: 9/5/2018       Dear Dr Trevor Taylor:    Thank you for referring Vicky Vega to me for evaluation  Below are my notes for this consultation  If you have questions, please do not hesitate to call me  I look forward to following your patient along with you  Sincerely,        Ayana Cleary MD        CC: No Recipients  Ayana Cleary MD  9/5/2018  1:22 PM  Sign at close encounter  Assessment/Plan:    Malignant neoplasm prostate [facility])    Per NCCN guidelines, imaging studies are not necessary  The patient will seek a radiation oncology  opinion and a robotic surgery opinion  Diagnoses and all orders for this visit:    Malignant neoplasm prostate ([address] )  -     POCT urine dip auto non-scope  -     Ambulatory referral to Radiation Oncology; Future    Other specified counseling          Subjective:      Patient ID: Vicky Vega is a 66 y o  male  HPI   Prostate cancer: The patient and his wife return to discuss results of his prostate biopsy  Six of 12 specimens contained Abbey 6 prostate cancer in 2 separate foci, 1 on each side  We discussed the NCCN guidelines on pages PROS 1, 2 and 5,  Version 3  2018  We also reviewed the [facility] pre radical prostatectomy nomogram   In summary, this indicates a 3% chance of note involvement, a 3% chance of seminal vesicle involvement, but a 99% disease specific survival at 10 and 15 years  Because of the patient's relative youth, I do not think active surveillance is appropriate  Given his projected life expectancy, he will require some form of treatment eventually  I favor treating sooner rather than later  We discussed a radical prostatectomy with the robot and external beam radiation therapy in detail    Advantages, disadvantages and side effects of each approach were discussed in detail  At this point, the patient is going to get an opinion from our radiation oncology department and from Dr Tanna Cockayne, our robotic surgeon  In addition, they may seek a 2nd opinion at [address] [place] [address] with Dr Carol Jules or at [facility] with Dr Amadeo Meigs  The patient will return to me in several weeks after he sees doctors Jose and our radiation oncologist and we will go from there  The patient has no hold-over effects or complications related to his biopsy  This discussion took 45 minutes  The following portions of the patient's history were reviewed and updated as appropriate: allergies, current medications, past family history, past medical history, past social history, past surgical history and problem list     Review of Systems   Constitutional: Negative for activity change and fatigue  Respiratory: Negative for shortness of breath and wheezing  Cardiovascular: Negative for chest pain  Gastrointestinal: Negative for abdominal pain  Genitourinary: Negative for difficulty urinating, dysuria, frequency, hematuria and urgency  Musculoskeletal: Negative for back pain and gait problem  Skin: Negative  Allergic/Immunologic: Negative  Neurological: Negative  Psychiatric/Behavioral: Negative  Objective:      /84 (BP Location: Left arm, Patient Position: Sitting, Cuff Size: Standard)   Ht 6' 2" (1 88 m)   Wt 87 5 kg (193 lb)   BMI 24 78 kg/m²           Physical Exam   Constitutional: He is oriented to person, place, and time  He appears well-developed and well-nourished  HENT:   Head: Normocephalic and atraumatic  Eyes: EOM are normal    Neck: Normal range of motion  Pulmonary/Chest: Effort normal    Musculoskeletal: Normal range of motion  Neurological: He is alert and oriented to person, place, and time  Skin: Skin is warm and dry  Psychiatric: He has a normal mood and affect   His behavior is normal  Judgment and thought content normal

## 2018-09-05 NOTE — ASSESSMENT & PLAN NOTE
Per NCCN guidelines, imaging studies are not necessary  The patient will seek a radiation oncology  opinion and a robotic surgery opinion

## 2018-09-05 NOTE — TELEPHONE ENCOUNTER
Pt receives cortisone injections on his foot twice a year  Informed it will not interfere with treatment for prostate cancer

## 2018-09-18 ENCOUNTER — OFFICE VISIT (OUTPATIENT)
Dept: UROLOGY | Facility: MEDICAL CENTER | Age: 63
End: 2018-09-18
Payer: COMMERCIAL

## 2018-09-18 VITALS
SYSTOLIC BLOOD PRESSURE: 130 MMHG | WEIGHT: 191 LBS | BODY MASS INDEX: 24.51 KG/M2 | DIASTOLIC BLOOD PRESSURE: 82 MMHG | HEIGHT: 74 IN

## 2018-09-18 DIAGNOSIS — C61 MALIGNANT NEOPLASM OF PROSTATE (HCC): Primary | ICD-10-CM

## 2018-09-18 PROCEDURE — 99215 OFFICE O/P EST HI 40 MIN: CPT | Performed by: UROLOGY

## 2018-09-18 NOTE — PROGRESS NOTES
Assessment/Plan:      Diagnoses and all orders for this visit:    Malignant neoplasm of prostate ([facility] [address] )        Plan:  After careful review and summary of the medical history and all laboratory and radiographic studies as outlined above, I discussed with the patient and family members at length the finding of adenocarcinoma of the prostate  We discussed the patient's grade (Abbey score 6), other parameters, and clinical stage based upon the above findings  I then discussed the options available to him at this time, based on his clinically localized prostate cancer  These include observation, radiation therapy, radical prostatectomy and cryosurgery  Regarding observation, they understand this is ideally suited for men with either clinically insignificant or slow growing cancer or for whom because of age or other concurrent medical conditions the risks of treatment are greater than the potential benefits of treatment  While this option avoids treatment-associated side effects, I explained it requires frequent evaluation in order to monitor for potential disease progression, and that despite vigilant follow-up there will be cases where the cancer spreads and is no longer potentially curable with local therapies  I reiterated that for many men, watchful waiting can be associated with increased anxiety and stress concerning their diagnosis, and that commonly patients only defer definitive treatment to a later date  With regard to radiation therapy, I reviewed the options available  These include external beam radiation therapy versus brachytherapy  Regarding XRT, we discussed the differences in conventional versus 3D conformal external beam radiation therapy versus IMRT  The indications, risks and potential complications of each of these options as well as the rationale for using selective radiation options were reviewed in detail   We also discussed interstitial seed therapy and risk stratification as a determinant for optimal therapies  All questions were again answered  With regard to the surgical options, we reviewed the different options including a radical perineal prostatectomy versus the radical retropubic prostatectomy via open, laparoscopic and robotic approach  I explained the operations that I primarily perform, robotic-assisted laparoscopic radical prostatectomy, and compared it with the alternative that I am also experienced with, traditional open retropubic prostatectomy  I also mentioned the less commonly used but accepted perineal approach  The advantages and limitations of these various approaches were described in detail  The anticipated hospital and post-operative courses were reviewed  I highlighted the relevant anatomy, and we discussed the importance of neurovascular bundle preservation (nerve-sparing) to minimize negative effects on erectile function and continence  While in the majority of cases bilateral nerve-sparing is appropriate and possible, they understand that in certain circumstances intentional partial or complete unilateral or bilateral neurovascular bundle resection is necessary when there is suspicion that cancer extends into that region; this may be determined either preoperatively or intraoperatively with or without biopsy  The patient is clearly aware that he may still be rendered sexually impotent and incontinent as a consequence of the operative procedure  The possibility of stress-type urinary incontinence associated with either form of radical prostatectomy was also detailed for the patient  I explained that concurrent pelvic lymphadenectomy may be performed, for intermediate, or high risk clinical disease or suspicious intraoperative adenopathy, which serves both a diagnostic and potentially therapeutic purpose if metastatic disease is identified   We discussed the general risks of surgery, which include but are not limited to infection, bleeding, medical and anesthetic complications, and adjacent organ involvement or injury  With regard to cryosurgery, this is felt to be an experimental option at this time secondary to insufficient data to support its routine use in this clinical setting  Regarding hormonal therapy, we discussed it is primarily used as systemic therapy for patients with advanced or metastatic disease, but that it also has been shown to be beneficial in conjunction with radiation therapy for certain categories of patients  The patient understands that hormonal therapy alone is not considered curative treatment and will slow the growth of but not eliminate prostate cancer  The patient was advised to become an active participant in their care and to become proactive in their care  They were encouraged to call my office with any unanswered questions or seek additional medical opinions at their discretion  They understand that the decision as to which approach to take is one made based on the medical risks and benefits as well as their own informed tolerance of this risk  He was already offered referral to radiation oncology, and he is due to meet with that consultant sometime later this week  Once the patient has decided to proceed with a RALP/PLND, he will need preadmission testing and anesthesia clearance  Kegel exercises were reviewed, and he may be seen, pre operatively, by our continence nurse  I have discussed the risks, benefits and alternatives to the proposed procedure/treatment plan with the patient   Our discussion also included the risks and benefits of the alternatives, including doing nothing  He was encouraged to ask questions and all questions were answered to their satisfaction  Subjective:  Diagnosis of prostate cancer, consultation for consideration of robotic surgery     Patient ID: Denise Morales is a 66 y o  male      HPI  The patient initially presented with a PSA of 4 7 a percent free PSA of 17%, and was prostate biopsied by Dr Lorraine Buchanan where the specimen was found to have 6 of 12 cores of Abbey score 6 cancer  The patient was referred for consultation of the management options for his cancer  He denies any urinary symptoms such as obstructive voiding symptoms with the need to strain or bear down to generate a urinary stream, nor has he had any symptoms of dysuria, hematuria, flank or abdominal pains  He states that he has a normal appetite, good bowel function and has not had any unintended weight loss  Review of Systems   Constitutional: Negative  HENT: Negative  Eyes: Negative  Respiratory: Negative  Cardiovascular: Negative  Gastrointestinal: Negative  Endocrine: Negative  Genitourinary: Negative  Musculoskeletal: Negative  Skin: Negative  Allergic/Immunologic: Negative  Neurological: Negative  Hematological: Negative  Psychiatric/Behavioral: Negative  Objective:     Physical Exam   Constitutional: He is oriented to person, place, and time  He appears well-developed and well-nourished  No distress  HENT:   Head: Normocephalic and atraumatic  Nose: Nose normal    Mouth/Throat: Oropharynx is clear and moist    Eyes: Conjunctivae and EOM are normal  Pupils are equal, round, and reactive to light  No scleral icterus  Neck: Normal range of motion  Neck supple  Cardiovascular: Normal rate, regular rhythm, normal heart sounds and intact distal pulses  No murmur heard  Pulmonary/Chest: Effort normal and breath sounds normal  No respiratory distress  He has no wheezes  He has no rales  Abdominal: Soft  Bowel sounds are normal  He exhibits no distension and no mass  There is no tenderness  Genitourinary: Prostate normal  Prostate is not enlarged and not tender  Musculoskeletal: Normal range of motion  He exhibits no edema or tenderness  Lymphadenopathy:     He has no cervical adenopathy     Neurological: He is alert and oriented to person, place, and time  No cranial nerve deficit  Skin: Skin is warm and dry  No rash noted  No erythema  No pallor  Psychiatric: He has a normal mood and affect  His behavior is normal  Judgment and thought content normal    Nursing note and vitals reviewed  Prostate biopsy from 08/17/2018: Final Diagnosis   A  Prostate, Left lateral base needle biopsy:    -Small focus of adenocarcinoma of the prostate  , confirmed by  stain  - [place] grade 3+3=6   -Tumor extent: single focus in one of one submitted core  -Tumor volume: tumor represents approximately 5% of biopsy volume   -Perineural invasion: not identified     B  Prostate, Left base,  needle biopsy:    -Benign prostatic tissue,     C  Prostate, Right base needle biopsy:    -Benign prostatic tissue with mild chronic inflammation     D  Prostate, Right lateral base needle biopsy:    -Prostatic tissue with  Small focus of atypical prostatic glands      E  Prostate, Left lateral mid, needle biopsy:  -Small focus of adenocarcinoma of the prostate, confirmed by triple stain  - [place] grade 3+3=6   -Tumor extent: single focus in one of one submitted core  -Tumor volume: tumor represents approximately 5% of biopsy volume   -Perineural invasion: not identified     F  Prostate, Left mid, needle biopsy:    -Benign prostatic tissue     G  Prostate, Right mid needle biopsy:    - Benign Prostatic tissue confirmed by  stain      H  Prostate, Right lateral mid , needle biopsy:  Adenocarcinoma of the prostate, confirmed by triple stain  - Abbey grade 3+3=6   -Tumor extent: single focus in one of one submitted core  -Tumor volume: tumor represents approximately 20% of biopsy volume   -Perineural invasion: not identified        I   Prostate, Left lateral apex , needle biopsy:  -Adenocarcinoma of the prostate, confirmed by   stain  - [place] grade 3+3=6   -Tumor extent: single focus in one of one submitted core  Tumor volume: tumor represents approximately 15% of biopsy volume   -Perineural invasion: not identified     J  Prostate, Left apex needle biopsy:    -Benign prostatic tissue     K  Prostate, Right apex needle biopsy:  -Small focus of adenocarcinoma of the prostate, confirmed by triple stain  - Abbey grade 3+3=6   -Tumor extent: single focus in one of one submitted core  -Tumor volume: tumor represents approximately 5% of biopsy volume   -Perineural invasion: not identified     L  Prostate, Right lateral apex needle biopsy:  -Small foci of adenocarcinoma of the prostate, confirmed by   stain  - Abbey grade 3+3=6   -Tumor extent: three foci in one of one submitted core  -Tumor volume: tumor foci represents approximately 5%, 5%, 10% of one core biopsy   -Perineural invasion: not identified       Truss volume of his prostate was 25 cc

## 2018-09-18 NOTE — LETTER
September 18, 2018     Jonathon Gallegos MD  [facility]  [address]  [place] [address]    Patient: Doris Woo   YOB: 1955   Date of Visit: 9/18/2018       Dear Dr Sandy Generous: Thank you for referring Doris Woo to me for evaluation  Below are my notes for this consultation  If you have questions, please do not hesitate to call me  I look forward to following your patient along with you  Sincerely,        Josephine Jordan MD        CC: Barbra Litten, DO Vianne Counter, MD  9/18/2018  4:13 PM  Sign at close encounter  Assessment/Plan:      Diagnoses and all orders for this visit:    Malignant neoplasm of prostate [facility])        Plan:  After careful review and summary of the medical history and all laboratory and radiographic studies as outlined above, I discussed with the patient and family members at length the finding of adenocarcinoma of the prostate  We discussed the patient's grade (Abbey score 6), other parameters, and clinical stage based upon the above findings  I then discussed the options available to him at this time, based on his clinically localized prostate cancer  These include observation, radiation therapy, radical prostatectomy and cryosurgery  Regarding observation, they understand this is ideally suited for men with either clinically insignificant or slow growing cancer or for whom because of age or other concurrent medical conditions the risks of treatment are greater than the potential benefits of treatment  While this option avoids treatment-associated side effects, I explained it requires frequent evaluation in order to monitor for potential disease progression, and that despite vigilant follow-up there will be cases where the cancer spreads and is no longer potentially curable with local therapies   I reiterated that for many men, watchful waiting can be associated with increased anxiety and stress concerning their diagnosis, and that commonly patients only defer definitive treatment to a later date  With regard to radiation therapy, I reviewed the options available  These include external beam radiation therapy versus brachytherapy  Regarding XRT, we discussed the differences in conventional versus 3D conformal external beam radiation therapy versus IMRT  The indications, risks and potential complications of each of these options as well as the rationale for using selective radiation options were reviewed in detail  We also discussed interstitial seed therapy and risk stratification as a determinant for optimal therapies  All questions were again answered  With regard to the surgical options, we reviewed the different options including a radical perineal prostatectomy versus the radical retropubic prostatectomy via open, laparoscopic and robotic approach  I explained the operations that I primarily perform, robotic-assisted laparoscopic radical prostatectomy, and compared it with the alternative that I am also experienced with, traditional open retropubic prostatectomy  I also mentioned the less commonly used but accepted perineal approach  The advantages and limitations of these various approaches were described in detail  The anticipated hospital and post-operative courses were reviewed  I highlighted the relevant anatomy, and we discussed the importance of neurovascular bundle preservation (nerve-sparing) to minimize negative effects on erectile function and continence  While in the majority of cases bilateral nerve-sparing is appropriate and possible, they understand that in certain circumstances intentional partial or complete unilateral or bilateral neurovascular bundle resection is necessary when there is suspicion that cancer extends into that region; this may be determined either preoperatively or intraoperatively with or without biopsy   The patient is clearly aware that he may still be rendered sexually impotent and incontinent as a consequence of the operative procedure  The possibility of stress-type urinary incontinence associated with either form of radical prostatectomy was also detailed for the patient  I explained that concurrent pelvic lymphadenectomy may be performed, for intermediate, or high risk clinical disease or suspicious intraoperative adenopathy, which serves both a diagnostic and potentially therapeutic purpose if metastatic disease is identified  We discussed the general risks of surgery, which include but are not limited to infection, bleeding, medical and anesthetic complications, and adjacent organ involvement or injury  With regard to cryosurgery, this is felt to be an experimental option at this time secondary to insufficient data to support its routine use in this clinical setting  Regarding hormonal therapy, we discussed it is primarily used as systemic therapy for patients with advanced or metastatic disease, but that it also has been shown to be beneficial in conjunction with radiation therapy for certain categories of patients  The patient understands that hormonal therapy alone is not considered curative treatment and will slow the growth of but not eliminate prostate cancer  The patient was advised to become an active participant in their care and to become proactive in their care  They were encouraged to call my office with any unanswered questions or seek additional medical opinions at their discretion  They understand that the decision as to which approach to take is one made based on the medical risks and benefits as well as their own informed tolerance of this risk  He was already offered referral to radiation oncology, and he is due to meet with that consultant sometime later this week  Once the patient has decided to proceed with a RALP/PLND, he will need preadmission testing and anesthesia clearance   Kegel exercises were reviewed, and he may be seen, pre operatively, by our continence nurse   I have discussed the risks, benefits and alternatives to the proposed procedure/treatment plan with the patient   Our discussion also included the risks and benefits of the alternatives, including doing nothing  He was encouraged to ask questions and all questions were answered to their satisfaction  Subjective:  Diagnosis of prostate cancer, consultation for consideration of robotic surgery     Patient ID: Doris Woo is a 66 y o  male  HPI  The patient initially presented with a PSA of 4 7 a percent free PSA of 17%, and was prostate biopsied by Dr Vika Bond where the specimen was found to have 6 of 12 cores of Abbey score 6 cancer  The patient was referred for consultation of the management options for his cancer  He denies any urinary symptoms such as obstructive voiding symptoms with the need to strain or bear down to generate a urinary stream, nor has he had any symptoms of dysuria, hematuria, flank or abdominal pains  He states that he has a normal appetite, good bowel function and has not had any unintended weight loss  Review of Systems   Constitutional: Negative  HENT: Negative  Eyes: Negative  Respiratory: Negative  Cardiovascular: Negative  Gastrointestinal: Negative  Endocrine: Negative  Genitourinary: Negative  Musculoskeletal: Negative  Skin: Negative  Allergic/Immunologic: Negative  Neurological: Negative  Hematological: Negative  Psychiatric/Behavioral: Negative  Objective:     Physical Exam   Constitutional: He is oriented to person, place, and time  He appears well-developed and well-nourished  No distress  HENT:   Head: Normocephalic and atraumatic  Nose: Nose normal    Mouth/Throat: Oropharynx is clear and moist    Eyes: Conjunctivae and EOM are normal  Pupils are equal, round, and reactive to light  No scleral icterus  Neck: Normal range of motion  Neck supple     Cardiovascular: Normal rate, regular rhythm, normal heart sounds and intact distal pulses  No murmur heard  Pulmonary/Chest: Effort normal and breath sounds normal  No respiratory distress  He has no wheezes  He has no rales  Abdominal: Soft  Bowel sounds are normal  He exhibits no distension and no mass  There is no tenderness  Genitourinary: Prostate normal  Prostate is not enlarged and not tender  Musculoskeletal: Normal range of motion  He exhibits no edema or tenderness  Lymphadenopathy:     He has no cervical adenopathy  Neurological: He is alert and oriented to person, place, and time  No cranial nerve deficit  Skin: Skin is warm and dry  No rash noted  No erythema  No pallor  Psychiatric: He has a normal mood and affect  His behavior is normal  Judgment and thought content normal    Nursing note and vitals reviewed  Prostate biopsy from 08/17/2018: Final Diagnosis   A  Prostate, Left lateral base needle biopsy:    -Small focus of adenocarcinoma of the prostate  , confirmed by  stain  - Abbey grade 3+3=6   -Tumor extent: single focus in one of one submitted core  -Tumor volume: tumor represents approximately 5% of biopsy volume   -Perineural invasion: not identified     B  Prostate, Left base,  needle biopsy:    -Benign prostatic tissue,     C  Prostate, Right base needle biopsy:    -Benign prostatic tissue with mild chronic inflammation     D  Prostate, Right lateral base needle biopsy:    -Prostatic tissue with  Small focus of atypical prostatic glands      E  Prostate, Left lateral mid, needle biopsy:  -Small focus of adenocarcinoma of the prostate, confirmed by triple stain  - [place] grade 3+3=6   -Tumor extent: single focus in one of one submitted core  -Tumor volume: tumor represents approximately 5% of biopsy volume   -Perineural invasion: not identified     F  Prostate, Left mid, needle biopsy:    -Benign prostatic tissue     G   Prostate, Right mid needle biopsy:    - Benign Prostatic tissue confirmed by  stain      H  Prostate, Right lateral mid , needle biopsy:  Adenocarcinoma of the prostate, confirmed by triple stain  - Abbey grade 3+3=6   -Tumor extent: single focus in one of one submitted core  -Tumor volume: tumor represents approximately 20% of biopsy volume   -Perineural invasion: not identified        I  Prostate, Left lateral apex , needle biopsy:  -Adenocarcinoma of the prostate, confirmed by   stain  - [place] grade 3+3=6   -Tumor extent: single focus in one of one submitted core  Tumor volume: tumor represents approximately 15% of biopsy volume   -Perineural invasion: not identified     J  Prostate, Left apex needle biopsy:    -Benign prostatic tissue     K  Prostate, Right apex needle biopsy:  -Small focus of adenocarcinoma of the prostate, confirmed by triple stain  - [place] grade 3+3=6   -Tumor extent: single focus in one of one submitted core  -Tumor volume: tumor represents approximately 5% of biopsy volume   -Perineural invasion: not identified     L  Prostate, Right lateral apex needle biopsy:  -Small foci of adenocarcinoma of the prostate, confirmed by   stain  - [place] grade 3+3=6   -Tumor extent: three foci in one of one submitted core  -Tumor volume: tumor foci represents approximately 5%, 5%, 10% of one core biopsy   -Perineural invasion: not identified       Truss volume of his prostate was 25  cc

## 2018-09-20 ENCOUNTER — RADIATION ONCOLOGY CONSULT (OUTPATIENT)
Dept: RADIATION ONCOLOGY | Facility: MEDICAL CENTER | Age: 63
End: 2018-09-20
Payer: COMMERCIAL

## 2018-09-20 VITALS
HEART RATE: 76 BPM | SYSTOLIC BLOOD PRESSURE: 148 MMHG | HEIGHT: 74 IN | TEMPERATURE: 97.9 F | DIASTOLIC BLOOD PRESSURE: 88 MMHG | WEIGHT: 200 LBS | RESPIRATION RATE: 18 BRPM | BODY MASS INDEX: 25.67 KG/M2

## 2018-09-20 DIAGNOSIS — C61 MALIGNANT NEOPLASM PROSTATE (HCC): Primary | ICD-10-CM

## 2018-09-20 PROCEDURE — 99242 OFF/OP CONSLTJ NEW/EST SF 20: CPT | Performed by: RADIOLOGY

## 2018-09-20 NOTE — PROGRESS NOTES
Bret Luther  1955  Mr Sonja Kennedy is a 66 y o  male    Chief Complaint   Patient presents with    Consult     Radiation Oncology       Assessment:   Stage IIB, Abbey 6 adenocarcinoma prostate, PSA 5 4 ng/ml,  present in 7 of 12 core biopsies and intermediate volume disease  Discussion and summary:  He is only 66 and in excellent health will be a good candidate for robotic radical prostatectomy given the [place] score 6 and not high volume disease  We discuss radiation therapy (IMRT) advise him treatment course is 9 weeks or 45 treatments  This will translate to 81 Gy to the prostate and 60 Gy to the seminal vesicles  We inform patient of minimal acute side effects possible loose bowels, frequency or burning urination and fatigue  However there is rare late complication of injury to the rectum and bladder  We also discuss placement of fiducial markers should he opt for radiation therapy although he is counseled to consider surgery his first choice  He may or may not go for second opinion  He said will discuss with his primary care physician and Dr Ever Tsai  We also made him aware that radiation therapy may be recommended following surgery in an adjuvant fashion if indicated for adverse pathologic features or later when there is PSA relapse  Physical examination: Patient appears stated age, slightly anxious but cooperative without any complaints  There are no palpable nodes  Lungs are clear  No abdominal masses  Digital rectal examination reveals slightly enlarged prostate gland without nodules  Cancer Staging  No matching staging information was found for the patient  66year old with prostatic adenocarcinoma, [place] 6,  for initial consult, referred by Dr Ever Tsai  PSA trends:  3/15/16 - 4 2 ng/mL  4/20/17 - 3 8 ng/mL  5/23/18 - 4 7 ng/mL  7/20/18 - 5 4 ng/mL    On 6/6/2018, patient saw Dr Ever Tsai, urology, for elevated PSA   At that time, patient was content to live with urinary symptoms  On 8/3/2018, patient again saw Dr Jacinda Mackey with PSA elevating since last lab  Discussed further workup of U/S and biopsy  On 8/17/2018, patient had TRUSP with biopsy in Dr Jeannette Rodriguez office  On 9/5/2018, patient saw Dr Jacinda Mackey to discuss results  Options were discussed and due to the patient's age, recommendations were to treat earlier, rather than wait  Patient is referred to rad/onc and surgeon  In addition, patient may seek a 2nd opino at [facility] with Dr Scout Oviedo or [facility] with Dr Tong Men  9/18/18 - Dr Raheem Villarreal, surg/onc  Still discussing options  Future:  10/2/18 - Dr Jacinda Mackey  6/13/19 - Dr Jacinda Mackey         Malignant neoplasm prostate [facility])    8/17/2018 Initial Diagnosis     Malignant neoplasm prostate ([facility] [address] )         8/17/2018 Biopsy     Biopsy Pathology- Prostatic adenocarcinoma    LT Lateral LT Medial RT Medial RT Lateral   [place] 3+3=6, single focus in one of one core, 5% Benign Benign Small focus of atypical prostatic glands   [place] 3+3=6  Single focus in one of one core, 5% Benign Benign [place] 3+3=6  Single focus in one of one core, 20%   [place] 3+3=6  Single focus in one of one core, 15% Benign [place] 3+3=6  Single focus in one of one core, 5% Abbey 3+3=6  Three foci in one of one core, 5%, 5%, 10%                 Clinical Trial: no    Screening  Tobacco  Current tobacco user: no  If yes, brief counseling provided: NA    Hypertension  Hypertension screening performed: yes  Normotensive:  no  If no, referred to PCP: yes    Depression Screening  Screened for depression using PHQ-2: yes    Screened for depression using PHQ-9:  no  Screening positive or negative:  negative  If score >4, was any of the following actions taken?    Additional evaluation for depression, suicide risk assesment, referral to PCP or psychiatry, medication started:  n/a    Advanced Care Planning for Patients >65 years  [address] [place] [address] Discussed:  n/a  Patient named surrogate decision maker or care plan in chart: n/a    Health Maintenance   Topic Date Due    Pneumococcal PPSV23 Highest Risk Adult (1 of 3 - PCV13) 07/21/1976    DTaP,Tdap,and Td Vaccines (1 - Tdap) 07/21/1978    INFLUENZA VACCINE  09/01/2018    Depression Screening PHQ  06/01/2019    CRC Screening: Colonoscopy  09/30/2026       Patient Active Problem List   Diagnosis    Arthritis of knee, right    Erectile dysfunction of non-organic origin    Hyperlipidemia    IBS (irritable bowel syndrome)    Nonvenomous insect bite of thorax    Vitamin D deficiency    Elevated PSA    BPH with obstruction/lower urinary tract symptoms    Malignant neoplasm prostate ([address] )     Past Medical History:   Diagnosis Date    Abnormal cardiovascular stress test     last assessed-11/13/2015    Abnormal electrocardiogram     last assessed-11/2/2015    Arthritis     great toes    Benign essential hypertension     last assessed-5/1/2017    Elevated PSA     Elevated PSA     Encounter for screening colonoscopy for non-high-risk patient     family hx possible of colon ca    Hallux rigidus     last assessed-4/24/2014    Hyperlipidemia     Irregular heart beat     "rapid heart beat episode X1" - tested - no issue    Organic impotence     last assessed-4/24/2014    Rapid pulse     resolved-1/19/2016    Skin lesion     last assessed-5/1/2017    Tachycardia     last assessed-11/2/2015     Past Surgical History:   Procedure Laterality Date    BACK SURGERY      L4-L5    COLONOSCOPY      X 3-4     OTHER SURGICAL HISTORY      spinal diskectomy    [place] COLONOSCOPY FLX DX W/COLLJ SPEC WHEN PFRMD N/A 9/30/2016    Procedure: COLONOSCOPY;  Surgeon: Heriberto Luz MD;  Location: [facility];   Service: Colorectal    PROSTATE BIOPSY Bilateral 08/2018    REDUCTION OF TORSION OF TESTIS  2001    VASECTOMY       Family History   Problem Relation Age of Onset    Cancer Mother     Hepatitis Father Hepatitis C    Dementia Father      Social History     Social History    Marital status: /Civil Union     Spouse name: N/A    Number of children: N/A    Years of education: N/A     Occupational History    Sales      Social History Main Topics    Smoking status: Former Smoker     Packs/day: 0 50     Years: 9 00     Quit date: 1984    Smokeless tobacco: Never Used    Alcohol use 12 6 - 16 8 oz/week     21 - 28 Standard drinks or equivalent per week      Comment: Moderate drinker; 3-4 drinks daily    Drug use: No    Sexual activity: Not on file     Other Topics Concern    Not on file     Social History Narrative    No narrative on file       Current Outpatient Prescriptions:     aspirin (ADULT ASPIRIN EC LOW STRENGTH) 81 mg EC tablet, Take 1 tablet by mouth daily, Disp: , Rfl:     CIALIS 20 MG tablet, TAKE 1/2 TO 1 TABLET 1 HOURBEFORE ACTIVITY AS DIRECTED, Disp: 18 tablet, Rfl: 2    ezetimibe-simvastatin (VYTORIN) 10-10 mg per tablet, TAKE 1 TABLET DAILY, Disp: 90 tablet, Rfl: 0    meloxicam (MOBIC) 15 mg tablet, Take 15 mg by mouth daily, Disp: , Rfl:     No Known Allergies    Review of Systems:  Review of Systems   Constitutional: Negative  HENT: Negative  Eyes: Negative  Respiratory: Negative  Cardiovascular: Negative  Gastrointestinal:        Hemorrhoid itchiness since biopsy  Resolving  Endocrine: Negative  Genitourinary:        Hesitancy  Musculoskeletal: Negative  Skin: Negative  Allergic/Immunologic: Negative  Neurological: Negative  Hematological: Negative  Psychiatric/Behavioral: Negative  Vitals:    09/20/18 0806   BP: 148/88   BP Location: Left arm   Patient Position: Sitting   Cuff Size: Standard   Pulse: 76   Resp: 18   Temp: 97 9 °F (36 6 °C)   TempSrc: Tympanic   Weight: 90 7 kg (200 lb)   Height: 6' 2" (1 88 m)       Pain Score: 0-No pain    Imaging:No results found  Teaching: Carola Gómez [address] given and reviewed with patient and wife   All questions answered

## 2018-10-03 ENCOUNTER — OFFICE VISIT (OUTPATIENT)
Dept: UROLOGY | Facility: MEDICAL CENTER | Age: 63
End: 2018-10-03
Payer: COMMERCIAL

## 2018-10-03 VITALS
SYSTOLIC BLOOD PRESSURE: 126 MMHG | HEIGHT: 74 IN | WEIGHT: 192 LBS | BODY MASS INDEX: 24.64 KG/M2 | DIASTOLIC BLOOD PRESSURE: 76 MMHG

## 2018-10-03 DIAGNOSIS — C61 MALIGNANT NEOPLASM PROSTATE (HCC): Primary | ICD-10-CM

## 2018-10-03 LAB
SL AMB  POCT GLUCOSE, UA: NEGATIVE
SL AMB LEUKOCYTE ESTERASE,UA: NEGATIVE
SL AMB POCT BILIRUBIN,UA: NEGATIVE
SL AMB POCT BLOOD,UA: NEGATIVE
SL AMB POCT CLARITY,UA: CLEAR
SL AMB POCT COLOR,UA: YELLOW
SL AMB POCT KETONES,UA: NEGATIVE
SL AMB POCT NITRITE,UA: NEGATIVE
SL AMB POCT PH,UA: 7
SL AMB POCT SPECIFIC GRAVITY,UA: 1.02
SL AMB POCT URINE PROTEIN: NEGATIVE
SL AMB POCT UROBILINOGEN: 0.2

## 2018-10-03 PROCEDURE — 99213 OFFICE O/P EST LOW 20 MIN: CPT | Performed by: UROLOGY

## 2018-10-03 PROCEDURE — 81003 URINALYSIS AUTO W/O SCOPE: CPT | Performed by: UROLOGY

## 2018-10-03 NOTE — PROGRESS NOTES
Assessment/Plan:    Malignant neoplasm prostate [facility])  The patient will proceed with a robotic prostatectomy  Diagnoses and all orders for this visit:    Malignant neoplasm prostate ([address] )  -     POCT urine dip auto non-scope          Subjective:      Patient ID: Charlette Bence is a 66 y o  male  HPI    Prostate cancer: The patient has been evaluated by Dr Ricarda Lozano, our robotic surgeon and by the radiation therapy department also  The patient has elected to proceed with a robotic prostatectomy  He will be given an appointment with Dr Chance Blanchard to schedule this  The following portions of the patient's history were reviewed and updated as appropriate: allergies, current medications, past family history, past medical history, past social history, past surgical history and problem list     Review of Systems   Constitutional: Negative for activity change and fatigue  Respiratory: Negative for shortness of breath and wheezing  Cardiovascular: Negative for chest pain  History of cardiac arrhythmia  The patient has been evaluated by cardiology in the past No treatment indicated  Gastrointestinal: Negative for abdominal pain  Genitourinary: Negative for difficulty urinating, dysuria, frequency, hematuria and urgency  Musculoskeletal: Negative for back pain and gait problem  Skin: Negative  Allergic/Immunologic: Negative  Neurological: Negative  Psychiatric/Behavioral: Negative  Objective:      /76 (BP Location: Left arm, Patient Position: Sitting, Cuff Size: Standard)   Ht 6' 2" (1 88 m)   Wt 87 1 kg (192 lb)   BMI 24 65 kg/m²          Physical Exam   Constitutional: He is oriented to person, place, and time  He appears well-developed and well-nourished  HENT:   Head: Normocephalic and atraumatic  Eyes: EOM are normal    Neck: Normal range of motion  Pulmonary/Chest: Effort normal    Musculoskeletal: Normal range of motion     Neurological: He is alert and oriented to person, place, and time  Skin: Skin is warm and dry  Psychiatric: He has a normal mood and affect   His behavior is normal  Judgment and thought content normal

## 2018-10-03 NOTE — LETTER
October 3, 2018     Vann Lanes, DO  [address]    Patient: Ravindra Campbell   YOB: 1955   Date of Visit: 10/3/2018       Dear Dr Berta Moreno:    Thank you for referring Ravindra Campbell to me for evaluation  Below are my notes for this consultation  If you have questions, please do not hesitate to call me  I look forward to following your patient along with you  Sincerely,        Gucci Lynn MD        CC: No Recipients  Gucci Lynn MD  10/3/2018 10:44 AM  Sign at close encounter  Assessment/Plan:    Malignant neoplasm prostate [facility])  The patient will proceed with a robotic prostatectomy  Diagnoses and all orders for this visit:    Malignant neoplasm prostate ([facility] [address] )  -     POCT urine dip auto non-scope          Subjective:      Patient ID: Ravindra Campbell is a 66 y o  male  HPI    Prostate cancer: The patient has been evaluated by Dr Nava Little, our robotic surgeon and by the radiation therapy department also  The patient has elected to proceed with a robotic prostatectomy  He will be given an appointment with Dr Orlando Phillip to schedule this  The following portions of the patient's history were reviewed and updated as appropriate: allergies, current medications, past family history, past medical history, past social history, past surgical history and problem list     Review of Systems   Constitutional: Negative for activity change and fatigue  Respiratory: Negative for shortness of breath and wheezing  Cardiovascular: Negative for chest pain  History of cardiac arrhythmia  The patient has been evaluated by cardiology in the past No treatment indicated  Gastrointestinal: Negative for abdominal pain  Genitourinary: Negative for difficulty urinating, dysuria, frequency, hematuria and urgency  Musculoskeletal: Negative for back pain and gait problem  Skin: Negative  Allergic/Immunologic: Negative  Neurological: Negative  Psychiatric/Behavioral: Negative  Objective:      /76 (BP Location: Left arm, Patient Position: Sitting, Cuff Size: Standard)   Ht 6' 2" (1 88 m)   Wt 87 1 kg (192 lb)   BMI 24 65 kg/m²           Physical Exam   Constitutional: He is oriented to person, place, and time  He appears well-developed and well-nourished  HENT:   Head: Normocephalic and atraumatic  Eyes: EOM are normal    Neck: Normal range of motion  Pulmonary/Chest: Effort normal    Musculoskeletal: Normal range of motion  Neurological: He is alert and oriented to person, place, and time  Skin: Skin is warm and dry  Psychiatric: He has a normal mood and affect   His behavior is normal  Judgment and thought content normal

## 2018-10-09 ENCOUNTER — OFFICE VISIT (OUTPATIENT)
Dept: UROLOGY | Facility: MEDICAL CENTER | Age: 63
End: 2018-10-09
Payer: COMMERCIAL

## 2018-10-09 ENCOUNTER — PREP FOR PROCEDURE (OUTPATIENT)
Dept: UROLOGY | Facility: MEDICAL CENTER | Age: 63
End: 2018-10-09

## 2018-10-09 VITALS — WEIGHT: 192 LBS | HEIGHT: 74 IN | BODY MASS INDEX: 24.64 KG/M2

## 2018-10-09 DIAGNOSIS — C61 MALIGNANT NEOPLASM OF PROSTATE (HCC): Primary | ICD-10-CM

## 2018-10-09 DIAGNOSIS — C61 PROSTATE CANCER (HCC): Primary | ICD-10-CM

## 2018-10-09 PROCEDURE — 99215 OFFICE O/P EST HI 40 MIN: CPT | Performed by: UROLOGY

## 2018-10-09 NOTE — LETTER
October 9, 2018     Rafaela Joseph DO  [address]    Patient: Gale Ware   YOB: 1955   Date of Visit: 10/9/2018       Dear Dr Chan Human:    Thank you for referring Gale Ware to me for evaluation  Below are my notes for this consultation  If you have questions, please do not hesitate to call me  I look forward to following your patient along with you  Sincerely,        Sabas Do MD        CC: No Recipients  Sabas Do MD  10/9/2018 12:05 PM  Sign at close encounter  Assessment/Plan:       Diagnoses and all orders for this visit:     Malignant neoplasm of prostate [facility])         Plan:  After careful review and summary of the medical history and all laboratory and radiographic studies as outlined above, I discussed with the patient and family members at length the finding of adenocarcinoma of the prostate  We discussed the patient's grade (Abbey score 6), other parameters, and clinical stage based upon the above findings  I then discussed the options available to him at this time, based on his clinically localized prostate cancer  These include observation, radiation therapy, radical prostatectomy and cryosurgery  Regarding observation, they understand this is ideally suited for men with either clinically insignificant or slow growing cancer or for whom because of age or other concurrent medical conditions the risks of treatment are greater than the potential benefits of treatment  While this option avoids treatment-associated side effects, I explained it requires frequent evaluation in order to monitor for potential disease progression, and that despite vigilant follow-up there will be cases where the cancer spreads and is no longer potentially curable with local therapies   I reiterated that for many men, watchful waiting can be associated with increased anxiety and stress concerning their diagnosis, and that commonly patients only defer definitive treatment to a later date  THE PATIENT STATES HE HAS ABSOLUTELY NO INTERESTED IN THE ACTIVE SURVEILLANCE OPTION  With regard to radiation therapy, I reviewed the options available  These include external beam radiation therapy versus brachytherapy   Regarding XRT, we discussed the differences in conventional versus 3D conformal external beam radiation therapy versus IMRT  The indications, risks and potential complications of each of these options as well as the rationale for using selective radiation options were reviewed in detail  We also discussed interstitial seed therapy and risk stratification as a determinant for optimal therapies  All questions were again answered  With regard to the surgical options, we reviewed the different options including a radical perineal prostatectomy versus the radical retropubic prostatectomy via open, laparoscopic and robotic approach  I explained the operations that I primarily perform, robotic-assisted laparoscopic radical prostatectomy, and compared it with the alternative that I am also experienced with, traditional open retropubic prostatectomy  I also mentioned the less commonly used but accepted perineal approach  The advantages and limitations of these various approaches were described in detail  The anticipated hospital and post-operative courses were reviewed  I highlighted the relevant anatomy, and we discussed the importance of neurovascular bundle preservation (nerve-sparing) to minimize negative effects on erectile function and continence  While in the majority of cases bilateral nerve-sparing is appropriate and possible, they understand that in certain circumstances intentional partial or complete unilateral or bilateral neurovascular bundle resection is necessary when there is suspicion that cancer extends into that region; this may be determined either preoperatively or intraoperatively with or without biopsy   The patient is clearly aware that he may still be rendered sexually impotent and incontinent as a consequence of the operative procedure  The possibility of stress-type urinary incontinence associated with either form of radical prostatectomy was also detailed for the patient  I explained that concurrent pelvic lymphadenectomy may be performed, for intermediate, or high risk clinical disease or suspicious intraoperative adenopathy, which serves both a diagnostic and potentially therapeutic purpose if metastatic disease is identified  We discussed the general risks of surgery, which include but are not limited to infection, bleeding, medical and anesthetic complications, and adjacent organ involvement or injury  With regard to cryosurgery, this is felt to be an experimental option at this time secondary to insufficient data to support its routine use in this clinical setting  Regarding hormonal therapy, we discussed it is primarily used as systemic therapy for patients with advanced or metastatic disease, but that it also has been shown to be beneficial in conjunction with radiation therapy for certain categories of patients  The patient understands that hormonal therapy alone is not considered curative treatment and will slow the growth of but not eliminate prostate cancer  The patient was advised to become an active participant in their care and to become proactive in their care  They were encouraged to call my office with any unanswered questions or seek additional medical opinions at their discretion  They understand that the decision as to which approach to take is one made based on the medical risks and benefits as well as their own informed tolerance of this risk  Once the patient has decided to proceed with a RALP/PLND, he will need preadmission testing, cardiology and anesthesia clearance  Kegel exercises were reviewed, and he may be seen, pre operatively, by our continence nurse    I have discussed the risks, benefits and alternatives to the proposed procedure/treatment plan with the patient   Our discussion also included the risks and benefits of the alternatives, including doing nothing  He was encouraged to ask questions and all questions were answered to his satisfaction            Subjective:  Diagnosis of prostate cancer, consultation for scheduling robotic surgery      Patient ID: Dionisio Bello is a 66 y o  male      HPI  The patient initially presented with a PSA of 4 7 a percent free PSA of 17%, and was prostate biopsied by Dr Juve Petersen where the specimen was found to have 6 of 12 cores of Abbey score 6 cancer  The patient was referred for consultation of the management options for his cancer  He denies any urinary symptoms such as obstructive voiding symptoms with the need to strain or bear down to generate a urinary stream, nor has he had any symptoms of dysuria, hematuria, flank or abdominal pains  He states that he has a normal appetite, good bowel function and has not had any unintended weight loss  HE MET WITH THE RADIATION ONCOLOGIST AND THOROUGHLY DISCUSSED THAT OPTION  HE HAS CHOSEN TO PROCEED WITH SURGERY      Review of Systems   Constitutional: Negative  HENT: Negative  Eyes: Negative  Respiratory: Negative  Cardiovascular: Negative  Gastrointestinal: Negative  Endocrine: Negative  Genitourinary: Negative  Musculoskeletal: Negative  Skin: Negative  Allergic/Immunologic: Negative  Neurological: Negative  Hematological: Negative  Psychiatric/Behavioral: Negative            Objective:      Physical Exam   Constitutional: He is oriented to person, place, and time  He appears well-developed and well-nourished  No distress  HENT:   Head: Normocephalic and atraumatic  Nose: Nose normal    Mouth/Throat: Oropharynx is clear and moist    Eyes: Conjunctivae and EOM are normal  Pupils are equal, round, and reactive to light  No scleral icterus     Neck: Normal range of motion  Neck supple  Cardiovascular: Normal rate, regular rhythm, normal heart sounds and intact distal pulses  No murmur heard  Pulmonary/Chest: Effort normal and breath sounds normal  No respiratory distress  He has no wheezes  He has no rales  Abdominal: Soft  Bowel sounds are normal  He exhibits no distension and no mass  There is no tenderness  Genitourinary: Prostate normal  Prostate is not enlarged and not tender  Musculoskeletal: Normal range of motion  He exhibits no edema or tenderness  Lymphadenopathy:     He has no cervical adenopathy  Neurological: He is alert and oriented to person, place, and time  No cranial nerve deficit  Skin: Skin is warm and dry  No rash noted  No erythema  No pallor  Psychiatric: He has a normal mood and affect  His behavior is normal  Judgment and thought content normal    Nursing note and vitals reviewed  Prostate biopsy from 08/17/2018: Final Diagnosis   A  Prostate, Left lateral base needle biopsy:    -Small focus of adenocarcinoma of the prostate  , confirmed by  stain  - Abbey grade 3+3=6   -Tumor extent: single focus in one of one submitted core  -Tumor volume: tumor represents approximately 5% of biopsy volume   -Perineural invasion: not identified     B  Prostate, Left base,  needle biopsy:    -Benign prostatic tissue,     C  Prostate, Right base needle biopsy:    -Benign prostatic tissue with mild chronic inflammation     D  Prostate, Right lateral base needle biopsy:    -Prostatic tissue with  Small focus of atypical prostatic glands      E  Prostate, Left lateral mid, needle biopsy:  -Small focus of adenocarcinoma of the prostate, confirmed by triple stain  - [place] grade 3+3=6   -Tumor extent: single focus in one of one submitted core  -Tumor volume: tumor represents approximately 5% of biopsy volume   -Perineural invasion: not identified     F  Prostate, Left mid, needle biopsy:    -Benign prostatic tissue     G   Prostate, Right mid needle biopsy:    - Benign Prostatic tissue confirmed by  stain      H  Prostate, Right lateral mid , needle biopsy:  Adenocarcinoma of the prostate, confirmed by triple stain  - [place] grade 3+3=6   -Tumor extent: single focus in one of one submitted core  -Tumor volume: tumor represents approximately 20% of biopsy volume   -Perineural invasion: not identified        I  Prostate, Left lateral apex , needle biopsy:  -Adenocarcinoma of the prostate, confirmed by   stain  - [place] grade 3+3=6   -Tumor extent: single focus in one of one submitted core  Tumor volume: tumor represents approximately 15% of biopsy volume   -Perineural invasion: not identified     J  Prostate, Left apex needle biopsy:    -Benign prostatic tissue     K  Prostate, Right apex needle biopsy:  -Small focus of adenocarcinoma of the prostate, confirmed by triple stain  - [place] grade 3+3=6   -Tumor extent: single focus in one of one submitted core  -Tumor volume: tumor represents approximately 5% of biopsy volume   -Perineural invasion: not identified     L   Prostate, Right lateral apex needle biopsy:  -Small foci of adenocarcinoma of the prostate, confirmed by   stain  - [place] grade 3+3=6   -Tumor extent: three foci in one of one submitted core  -Tumor volume: tumor foci represents approximately 5%, 5%, 10% of one core biopsy   -Perineural invasion: not identified         Truss volume of his prostate was 25 cc Patient

## 2018-10-09 NOTE — PROGRESS NOTES
Assessment/Plan:       Diagnoses and all orders for this visit:     Malignant neoplasm of prostate ([facility] [address] )         Plan:  After careful review and summary of the medical history and all laboratory and radiographic studies as outlined above, I discussed with the patient and family members at length the finding of adenocarcinoma of the prostate  We discussed the patient's grade (Abbey score 6), other parameters, and clinical stage based upon the above findings  I then discussed the options available to him at this time, based on his clinically localized prostate cancer  These include observation, radiation therapy, radical prostatectomy and cryosurgery  Regarding observation, they understand this is ideally suited for men with either clinically insignificant or slow growing cancer or for whom because of age or other concurrent medical conditions the risks of treatment are greater than the potential benefits of treatment  While this option avoids treatment-associated side effects, I explained it requires frequent evaluation in order to monitor for potential disease progression, and that despite vigilant follow-up there will be cases where the cancer spreads and is no longer potentially curable with local therapies  I reiterated that for many men, watchful waiting can be associated with increased anxiety and stress concerning their diagnosis, and that commonly patients only defer definitive treatment to a later date  THE PATIENT STATES HE HAS ABSOLUTELY NO INTERESTED IN THE ACTIVE SURVEILLANCE OPTION  With regard to radiation therapy, I reviewed the options available  These include external beam radiation therapy versus brachytherapy   Regarding XRT, we discussed the differences in conventional versus 3D conformal external beam radiation therapy versus IMRT   The indications, risks and potential complications of each of these options as well as the rationale for using selective radiation options were reviewed in detail  We also discussed interstitial seed therapy and risk stratification as a determinant for optimal therapies  All questions were again answered  With regard to the surgical options, we reviewed the different options including a radical perineal prostatectomy versus the radical retropubic prostatectomy via open, laparoscopic and robotic approach  I explained the operations that I primarily perform, robotic-assisted laparoscopic radical prostatectomy, and compared it with the alternative that I am also experienced with, traditional open retropubic prostatectomy  I also mentioned the less commonly used but accepted perineal approach  The advantages and limitations of these various approaches were described in detail  The anticipated hospital and post-operative courses were reviewed  I highlighted the relevant anatomy, and we discussed the importance of neurovascular bundle preservation (nerve-sparing) to minimize negative effects on erectile function and continence  While in the majority of cases bilateral nerve-sparing is appropriate and possible, they understand that in certain circumstances intentional partial or complete unilateral or bilateral neurovascular bundle resection is necessary when there is suspicion that cancer extends into that region; this may be determined either preoperatively or intraoperatively with or without biopsy  The patient is clearly aware that he may still be rendered sexually impotent and incontinent as a consequence of the operative procedure  The possibility of stress-type urinary incontinence associated with either form of radical prostatectomy was also detailed for the patient  I explained that concurrent pelvic lymphadenectomy may be performed, for intermediate, or high risk clinical disease or suspicious intraoperative adenopathy, which serves both a diagnostic and potentially therapeutic purpose if metastatic disease is identified   We discussed the general risks of surgery, which include but are not limited to infection, bleeding, medical and anesthetic complications, and adjacent organ involvement or injury  With regard to cryosurgery, this is felt to be an experimental option at this time secondary to insufficient data to support its routine use in this clinical setting  Regarding hormonal therapy, we discussed it is primarily used as systemic therapy for patients with advanced or metastatic disease, but that it also has been shown to be beneficial in conjunction with radiation therapy for certain categories of patients  The patient understands that hormonal therapy alone is not considered curative treatment and will slow the growth of but not eliminate prostate cancer  The patient was advised to become an active participant in their care and to become proactive in their care  They were encouraged to call my office with any unanswered questions or seek additional medical opinions at their discretion  They understand that the decision as to which approach to take is one made based on the medical risks and benefits as well as their own informed tolerance of this risk  Once the patient has decided to proceed with a RALP/PLND, he will need preadmission testing, cardiology and anesthesia clearance  Kegel exercises were reviewed, and he may be seen, pre operatively, by our continence nurse  I have discussed the risks, benefits and alternatives to the proposed procedure/treatment plan with the patient   Our discussion also included the risks and benefits of the alternatives, including doing nothing   He was encouraged to ask questions and all questions were answered to his satisfaction            Subjective:  Diagnosis of prostate cancer, consultation for scheduling robotic surgery      Patient ID: Rizwan Morrow is a 66 y o  male      HPI  The patient initially presented with a PSA of 4 7 a percent free PSA of 17%, and was prostate biopsied by Dr Emanuel Degroot where the specimen was found to have 6 of 12 cores of Abbey score 6 cancer  The patient was referred for consultation of the management options for his cancer  He denies any urinary symptoms such as obstructive voiding symptoms with the need to strain or bear down to generate a urinary stream, nor has he had any symptoms of dysuria, hematuria, flank or abdominal pains  He states that he has a normal appetite, good bowel function and has not had any unintended weight loss  HE MET WITH THE RADIATION ONCOLOGIST AND THOROUGHLY DISCUSSED THAT OPTION  HE HAS CHOSEN TO PROCEED WITH SURGERY      Review of Systems   Constitutional: Negative  HENT: Negative  Eyes: Negative  Respiratory: Negative  Cardiovascular: Negative  Gastrointestinal: Negative  Endocrine: Negative  Genitourinary: Negative  Musculoskeletal: Negative  Skin: Negative  Allergic/Immunologic: Negative  Neurological: Negative  Hematological: Negative  Psychiatric/Behavioral: Negative            Objective:      Physical Exam   Constitutional: He is oriented to person, place, and time  He appears well-developed and well-nourished  No distress  HENT:   Head: Normocephalic and atraumatic  Nose: Nose normal    Mouth/Throat: Oropharynx is clear and moist    Eyes: Conjunctivae and EOM are normal  Pupils are equal, round, and reactive to light  No scleral icterus  Neck: Normal range of motion  Neck supple  Cardiovascular: Normal rate, regular rhythm, normal heart sounds and intact distal pulses  No murmur heard  Pulmonary/Chest: Effort normal and breath sounds normal  No respiratory distress  He has no wheezes  He has no rales  Abdominal: Soft  Bowel sounds are normal  He exhibits no distension and no mass  There is no tenderness  Genitourinary: Prostate normal  Prostate is not enlarged and not tender  Musculoskeletal: Normal range of motion  He exhibits no edema or tenderness     Lymphadenopathy:     He has no cervical adenopathy  Neurological: He is alert and oriented to person, place, and time  No cranial nerve deficit  Skin: Skin is warm and dry  No rash noted  No erythema  No pallor  Psychiatric: He has a normal mood and affect  His behavior is normal  Judgment and thought content normal    Nursing note and vitals reviewed  Prostate biopsy from 08/17/2018: Final Diagnosis   A  Prostate, Left lateral base needle biopsy:    -Small focus of adenocarcinoma of the prostate  , confirmed by  stain  - Abbey grade 3+3=6   -Tumor extent: single focus in one of one submitted core  -Tumor volume: tumor represents approximately 5% of biopsy volume   -Perineural invasion: not identified     B  Prostate, Left base,  needle biopsy:    -Benign prostatic tissue,     C  Prostate, Right base needle biopsy:    -Benign prostatic tissue with mild chronic inflammation     D  Prostate, Right lateral base needle biopsy:    -Prostatic tissue with  Small focus of atypical prostatic glands      E  Prostate, Left lateral mid, needle biopsy:  -Small focus of adenocarcinoma of the prostate, confirmed by triple stain  - Abbey grade 3+3=6   -Tumor extent: single focus in one of one submitted core  -Tumor volume: tumor represents approximately 5% of biopsy volume   -Perineural invasion: not identified     F  Prostate, Left mid, needle biopsy:    -Benign prostatic tissue     G  Prostate, Right mid needle biopsy:    - Benign Prostatic tissue confirmed by  stain      H  Prostate, Right lateral mid , needle biopsy:  Adenocarcinoma of the prostate, confirmed by triple stain  - Abbye grade 3+3=6   -Tumor extent: single focus in one of one submitted core  -Tumor volume: tumor represents approximately 20% of biopsy volume   -Perineural invasion: not identified        I   Prostate, Left lateral apex , needle biopsy:  -Adenocarcinoma of the prostate, confirmed by   stain  - Abbey grade 3+3=6   -Tumor extent: single focus in one of one submitted core  Tumor volume: tumor represents approximately 15% of biopsy volume   -Perineural invasion: not identified     J  Prostate, Left apex needle biopsy:    -Benign prostatic tissue     K  Prostate, Right apex needle biopsy:  -Small focus of adenocarcinoma of the prostate, confirmed by triple stain  - Abbey grade 3+3=6   -Tumor extent: single focus in one of one submitted core  -Tumor volume: tumor represents approximately 5% of biopsy volume   -Perineural invasion: not identified     L   Prostate, Right lateral apex needle biopsy:  -Small foci of adenocarcinoma of the prostate, confirmed by   stain  - Abbey grade 3+3=6   -Tumor extent: three foci in one of one submitted core  -Tumor volume: tumor foci represents approximately 5%, 5%, 10% of one core biopsy   -Perineural invasion: not identified         Truss volume of his prostate was 25 cc

## 2018-10-09 NOTE — H&P
H&P Exam - Urology   Jan Montoya 66 y o  male MRN: [id number]      Assessment/Plan     Assessment:  Prostate cancer    PLAN:  After careful review and summary of the medical history and all laboratory and radiographic studies as outlined above, I discussed with the patient and family members at length the finding of adenocarcinoma of the prostate  We discussed the patient's grade ([place] score 6), other parameters, and clinical stage based upon the above findings  I then discussed the options available to him at this time, based on his clinically localized prostate cancer  These include observation, radiation therapy, radical prostatectomy and cryosurgery  Regarding observation, they understand this is ideally suited for men with either clinically insignificant or slow growing cancer or for whom because of age or other concurrent medical conditions the risks of treatment are greater than the potential benefits of treatment  While this option avoids treatment-associated side effects, I explained it requires frequent evaluation in order to monitor for potential disease progression, and that despite vigilant follow-up there will be cases where the cancer spreads and is no longer potentially curable with local therapies  I reiterated that for many men, watchful waiting can be associated with increased anxiety and stress concerning their diagnosis, and that commonly patients only defer definitive treatment to a later date  THE PATIENT STATES HE HAS ABSOLUTELY NO INTERESTED IN THE ACTIVE SURVEILLANCE OPTION  With regard to radiation therapy, I reviewed the options available  These include external beam radiation therapy versus brachytherapy   Regarding XRT, we discussed the differences in conventional versus 3D conformal external beam radiation therapy versus IMRT   The indications, risks and potential complications of each of these options as well as the rationale for using selective radiation options were reviewed in detail  We also discussed interstitial seed therapy and risk stratification as a determinant for optimal therapies  All questions were again answered  With regard to the surgical options, we reviewed the different options including a radical perineal prostatectomy versus the radical retropubic prostatectomy via open, laparoscopic and robotic approach  I explained the operations that I primarily perform, robotic-assisted laparoscopic radical prostatectomy, and compared it with the alternative that I am also experienced with, traditional open retropubic prostatectomy  I also mentioned the less commonly used but accepted perineal approach  The advantages and limitations of these various approaches were described in detail  The anticipated hospital and post-operative courses were reviewed  I highlighted the relevant anatomy, and we discussed the importance of neurovascular bundle preservation (nerve-sparing) to minimize negative effects on erectile function and continence  While in the majority of cases bilateral nerve-sparing is appropriate and possible, they understand that in certain circumstances intentional partial or complete unilateral or bilateral neurovascular bundle resection is necessary when there is suspicion that cancer extends into that region; this may be determined either preoperatively or intraoperatively with or without biopsy  The patient is clearly aware that he may still be rendered sexually impotent and incontinent as a consequence of the operative procedure  The possibility of stress-type urinary incontinence associated with either form of radical prostatectomy was also detailed for the patient  I explained that concurrent pelvic lymphadenectomy may be performed, for intermediate, or high risk clinical disease or suspicious intraoperative adenopathy, which serves both a diagnostic and potentially therapeutic purpose if metastatic disease is identified   We discussed the general risks of surgery, which include but are not limited to infection, bleeding, medical and anesthetic complications, and adjacent organ involvement or injury  With regard to cryosurgery, this is felt to be an experimental option at this time secondary to insufficient data to support its routine use in this clinical setting  Regarding hormonal therapy, we discussed it is primarily used as systemic therapy for patients with advanced or metastatic disease, but that it also has been shown to be beneficial in conjunction with radiation therapy for certain categories of patients  The patient understands that hormonal therapy alone is not considered curative treatment and will slow the growth of but not eliminate prostate cancer  The patient was advised to become an active participant in their care and to become proactive in their care  They were encouraged to call my office with any unanswered questions or seek additional medical opinions at their discretion  They understand that the decision as to which approach to take is one made based on the medical risks and benefits as well as their own informed tolerance of this risk  Once the patient has decided to proceed with a RALP/PLND, he will need preadmission testing and anesthesia clearance  Kegel exercises were reviewed, and he may be seen, pre operatively, by our continence nurse  I have discussed the risks, benefits and alternatives to the proposed procedure/treatment plan with the patient   Our discussion also included the risks and benefits of the alternatives, including doing nothing  He was encouraged to ask questions and all questions were answered to their satisfaction          History of Present Illness   HPI:  Emmanuel Jones is a 66 y o  male who presented with a PSA of 4 7 a percent free PSA of 17%, and was prostate biopsied by Dr Abigail Olivarez where the specimen was found to have 6 of 12 cores of Abbey score 6 cancer    The patient was referred for consultation of the management options for his cancer  He denies any urinary symptoms such as obstructive voiding symptoms with the need to strain or bear down to generate a urinary stream, nor has he had any symptoms of dysuria, hematuria, flank or abdominal pains  He states that he has a normal appetite, good bowel function and has not had any unintended weight loss  HE RECENTLY CONSULTED WITH THE RADIATION ONCOLOGIST AND THOROUGHLY DISCUSSED THAT OPTION  HE HAS CHOSEN TO PROCEED WITH SURGERY  Prostate biopsy from 08/17/2018: Final Diagnosis   A  Prostate, Left lateral base needle biopsy:    -Small focus of adenocarcinoma of the prostate  , confirmed by  stain  - Abbey grade 3+3=6   -Tumor extent: single focus in one of one submitted core  -Tumor volume: tumor represents approximately 5% of biopsy volume   -Perineural invasion: not identified     B  Prostate, Left base,  needle biopsy:    -Benign prostatic tissue,     C  Prostate, Right base needle biopsy:    -Benign prostatic tissue with mild chronic inflammation     D  Prostate, Right lateral base needle biopsy:    -Prostatic tissue with  Small focus of atypical prostatic glands      E  Prostate, Left lateral mid, needle biopsy:  -Small focus of adenocarcinoma of the prostate, confirmed by triple stain  - Abbey grade 3+3=6   -Tumor extent: single focus in one of one submitted core  -Tumor volume: tumor represents approximately 5% of biopsy volume   -Perineural invasion: not identified     F  Prostate, Left mid, needle biopsy:    -Benign prostatic tissue     G  Prostate, Right mid needle biopsy:    - Benign Prostatic tissue confirmed by  stain      H   Prostate, Right lateral mid , needle biopsy:  Adenocarcinoma of the prostate, confirmed by triple stain  - Abbey grade 3+3=6   -Tumor extent: single focus in one of one submitted core  -Tumor volume: tumor represents approximately 20% of biopsy volume   -Perineural invasion: not identified        I  Prostate, Left lateral apex , needle biopsy:  -Adenocarcinoma of the prostate, confirmed by   stain  - Abbey grade 3+3=6   -Tumor extent: single focus in one of one submitted core  Tumor volume: tumor represents approximately 15% of biopsy volume   -Perineural invasion: not identified     J  Prostate, Left apex needle biopsy:    -Benign prostatic tissue     K  Prostate, Right apex needle biopsy:  -Small focus of adenocarcinoma of the prostate, confirmed by triple stain  - Abbey grade 3+3=6   -Tumor extent: single focus in one of one submitted core  -Tumor volume: tumor represents approximately 5% of biopsy volume   -Perineural invasion: not identified     L  Prostate, Right lateral apex needle biopsy:  -Small foci of adenocarcinoma of the prostate, confirmed by   stain  - [place] grade 3+3=6   -Tumor extent: three foci in one of one submitted core  -Tumor volume: tumor foci represents approximately 5%, 5%, 10% of one core biopsy   -Perineural invasion: not identified   TRUS volume of his prostate was 25 cc      Review of Systems   Constitutional: Negative  HENT: Negative  Eyes: Negative  Respiratory: Negative  Cardiovascular: Negative  Gastrointestinal: Negative  Endocrine: Negative  Genitourinary: Negative  Musculoskeletal: Negative  Skin: Negative  Allergic/Immunologic: Negative  Neurological: Negative  Hematological: Negative  Psychiatric/Behavioral: Negative          Historical Information   Past Medical History:   Diagnosis Date    Abnormal cardiovascular stress test     last assessed-11/13/2015    Abnormal electrocardiogram     last assessed-11/2/2015    Arthritis     great toes    Benign essential hypertension     last assessed-5/1/2017    Elevated PSA     Elevated PSA     Encounter for screening colonoscopy for non-high-risk patient     family hx possible of colon ca    Hallux rigidus     last assessed-4/24/2014    Hyperlipidemia     Irregular heart beat     "rapid heart beat episode X1" - tested - no issue    Organic impotence     last assessed-4/24/2014    Rapid pulse     resolved-1/19/2016    Skin lesion     last assessed-5/1/2017    Tachycardia     last assessed-11/2/2015     Past Surgical History:   Procedure Laterality Date    BACK SURGERY      L4-L5    COLONOSCOPY      X 3-4     OTHER SURGICAL HISTORY      spinal diskectomy    TX COLONOSCOPY FLX DX W/COLLJ SPEC WHEN PFRMD N/A 9/30/2016    Procedure: COLONOSCOPY;  Surgeon: Adiel Maria MD;  Location: [facility]; Service: Colorectal    PROSTATE BIOPSY Bilateral 08/2018    REDUCTION OF TORSION OF TESTIS  2001    VASECTOMY       Social History   History   Alcohol Use    12 6 - 16 8 oz/week    21 - 28 Standard drinks or equivalent per week     Comment: Moderate drinker; 3-4 drinks daily     History   Drug Use No     History   Smoking Status    Former Smoker    Packs/day: 0 50    Years: 9 00    Quit date: 1984   Smokeless Tobacco    Never Used     Family History:   Family History   Problem Relation Age of Onset    Cancer Mother     Hepatitis Father         Hepatitis C    Dementia Father        Meds/Allergies   all medications and allergies reviewed  No Known Allergies    Objective   Vitals: Height 6' 2" (1 88 m), weight 87 1 kg (192 lb)  Physical Exam   Constitutional: He is oriented to person, place, and time  He appears well-developed and well-nourished  No distress  HENT:   Head: Normocephalic and atraumatic  Nose: Nose normal    Mouth/Throat: Oropharynx is clear and moist    Eyes: Pupils are equal, round, and reactive to light  Conjunctivae and EOM are normal  No scleral icterus  Neck: Normal range of motion  Neck supple  Cardiovascular: Normal rate, regular rhythm, normal heart sounds and intact distal pulses  No murmur heard  Pulmonary/Chest: Effort normal and breath sounds normal  No respiratory distress  He has no wheezes   He has no rales  Abdominal: Soft  Bowel sounds are normal  He exhibits no distension and no mass  There is no tenderness  Genitourinary: Prostate normal and penis normal    Musculoskeletal: Normal range of motion  He exhibits no edema or tenderness  Lymphadenopathy:     He has no cervical adenopathy  Neurological: He is alert and oriented to person, place, and time  No cranial nerve deficit  Skin: Skin is warm and dry  No rash noted  No erythema  No pallor  Psychiatric: He has a normal mood and affect  His behavior is normal  Judgment and thought content normal    Nursing note and vitals reviewed  Lab Results: I have personally reviewed pertinent reports  CBC: No results found for: WBC, HGB, HCT, MCV, PLT, ADJUSTEDWBC, MCH, MCHC, RDW, MPV, NRBC  CMP: No results found for: NA, CL, CO2, ANIONGAP, BUN, CREATININE, GLUCOSE, CALCIUM, AST, ALT, ALKPHOS, PROT, ALBUMIN, BILITOT, EGFR  Urinalysis: No results found for: Kendell Harder, SPECGRAV, PHUR, LEUKOCYTESUR, NITRITE, PROTEINUA, GLUCOSEU, KETONESU, BILIRUBINUR, BLOODU  Imaging: I have personally reviewed pertinent reports  and I have personally reviewed pertinent films in PACS  EKG, Pathology, and Other Studies: I have personally reviewed pertinent reports  Counseling / Coordination of Care  Total floor / unit time spent today 80 minutes  Greater than 50% of total time was spent with the patient and / or family counseling and / or coordination of care

## 2018-10-11 ENCOUNTER — TELEPHONE (OUTPATIENT)
Dept: UROLOGY | Facility: MEDICAL CENTER | Age: 63
End: 2018-10-11

## 2018-10-11 DIAGNOSIS — C61 PROSTATE CANCER (HCC): Primary | ICD-10-CM

## 2018-10-11 NOTE — TELEPHONE ENCOUNTER
Tell patient it probably will not alter his recommendations for treatment, however I will submit it for PROLARIS analysis, but it may take 7 to10 days to get the results

## 2018-10-11 NOTE — TELEPHONE ENCOUNTER
Spoke to pt and explained Dr Stacie Hirsch is out of the office and will return next week  The message was sent and as soon as we hear we will contact the pt

## 2018-10-18 ENCOUNTER — APPOINTMENT (OUTPATIENT)
Dept: LAB | Facility: HOSPITAL | Age: 63
End: 2018-10-18
Attending: UROLOGY
Payer: COMMERCIAL

## 2018-10-18 ENCOUNTER — APPOINTMENT (OUTPATIENT)
Dept: PREADMISSION TESTING | Facility: HOSPITAL | Age: 63
End: 2018-10-18
Payer: COMMERCIAL

## 2018-10-18 ENCOUNTER — PROCEDURE VISIT (OUTPATIENT)
Dept: UROLOGY | Facility: MEDICAL CENTER | Age: 63
End: 2018-10-18

## 2018-10-18 ENCOUNTER — HOSPITAL ENCOUNTER (OUTPATIENT)
Dept: NON INVASIVE DIAGNOSTICS | Facility: HOSPITAL | Age: 63
Discharge: HOME | End: 2018-10-18
Attending: UROLOGY
Payer: COMMERCIAL

## 2018-10-18 ENCOUNTER — ANESTHESIA EVENT (OUTPATIENT)
Dept: PERIOP | Facility: HOSPITAL | Age: 63
DRG: 708 | End: 2018-10-18
Payer: COMMERCIAL

## 2018-10-18 ENCOUNTER — HOSPITAL ENCOUNTER (OUTPATIENT)
Dept: RADIOLOGY | Facility: HOSPITAL | Age: 63
Discharge: HOME | End: 2018-10-18
Attending: UROLOGY
Payer: COMMERCIAL

## 2018-10-18 DIAGNOSIS — C61 PROSTATE CANCER (HCC): ICD-10-CM

## 2018-10-18 DIAGNOSIS — C61 PROSTATE CANCER (HCC): Primary | ICD-10-CM

## 2018-10-18 LAB
ABO GROUP BLD: NORMAL
ALBUMIN SERPL BCP-MCNC: 4.1 G/DL (ref 3.5–5)
ALP SERPL-CCNC: 71 U/L (ref 46–116)
ALT SERPL W P-5'-P-CCNC: 62 U/L (ref 12–78)
ANION GAP SERPL CALCULATED.3IONS-SCNC: 7 MMOL/L (ref 4–13)
APTT PPP: 29 SECONDS (ref 24–36)
AST SERPL W P-5'-P-CCNC: 36 U/L (ref 5–45)
BASOPHILS # BLD AUTO: 0.05 THOUSANDS/ΜL (ref 0–0.1)
BASOPHILS NFR BLD AUTO: 1 % (ref 0–1)
BILIRUB SERPL-MCNC: 0.73 MG/DL (ref 0.2–1)
BILIRUB UR QL STRIP: NEGATIVE
BLD GP AB SCN SERPL QL: NEGATIVE
BUN SERPL-MCNC: 21 MG/DL (ref 5–25)
CALCIUM SERPL-MCNC: 9.2 MG/DL (ref 8.3–10.1)
CHLORIDE SERPL-SCNC: 101 MMOL/L (ref 100–108)
CLARITY UR: CLEAR
CO2 SERPL-SCNC: 29 MMOL/L (ref 21–32)
COLOR UR: YELLOW
CREAT SERPL-MCNC: 1.09 MG/DL (ref 0.6–1.3)
EOSINOPHIL # BLD AUTO: 0.17 THOUSAND/ΜL (ref 0–0.61)
EOSINOPHIL NFR BLD AUTO: 3 % (ref 0–6)
ERYTHROCYTE [DISTWIDTH] IN BLOOD BY AUTOMATED COUNT: 13 % (ref 11.6–15.1)
GFR SERPL CREATININE-BSD FRML MDRD: 73 ML/MIN/1.73SQ M
GLUCOSE SERPL-MCNC: 100 MG/DL (ref 65–140)
GLUCOSE UR STRIP-MCNC: NEGATIVE MG/DL
HCT VFR BLD AUTO: 45.7 % (ref 36.5–49.3)
HGB BLD-MCNC: 15 G/DL (ref 12–17)
HGB UR QL STRIP.AUTO: NEGATIVE
IMM GRANULOCYTES # BLD AUTO: 0.03 THOUSAND/UL (ref 0–0.2)
IMM GRANULOCYTES NFR BLD AUTO: 1 % (ref 0–2)
INR PPP: 1.01 (ref 0.86–1.17)
KETONES UR STRIP-MCNC: NEGATIVE MG/DL
LEUKOCYTE ESTERASE UR QL STRIP: NEGATIVE
LYMPHOCYTES # BLD AUTO: 2.23 THOUSANDS/ΜL (ref 0.6–4.47)
LYMPHOCYTES NFR BLD AUTO: 37 % (ref 14–44)
MCH RBC QN AUTO: 30.9 PG (ref 26.8–34.3)
MCHC RBC AUTO-ENTMCNC: 32.8 G/DL (ref 31.4–37.4)
MCV RBC AUTO: 94 FL (ref 82–98)
MONOCYTES # BLD AUTO: 0.74 THOUSAND/ΜL (ref 0.17–1.22)
MONOCYTES NFR BLD AUTO: 12 % (ref 4–12)
NEUTROPHILS # BLD AUTO: 2.74 THOUSANDS/ΜL (ref 1.85–7.62)
NEUTS SEG NFR BLD AUTO: 46 % (ref 43–75)
NITRITE UR QL STRIP: NEGATIVE
NRBC BLD AUTO-RTO: 0 /100 WBCS
PH UR STRIP.AUTO: 6 [PH] (ref 4.5–8)
PLATELET # BLD AUTO: 207 THOUSANDS/UL (ref 149–390)
PMV BLD AUTO: 10.5 FL (ref 8.9–12.7)
POTASSIUM SERPL-SCNC: 3.9 MMOL/L (ref 3.5–5.3)
PROT SERPL-MCNC: 7.9 G/DL (ref 6.4–8.2)
PROT UR STRIP-MCNC: NEGATIVE MG/DL
PROTHROMBIN TIME: 13.4 SECONDS (ref 11.8–14.2)
RBC # BLD AUTO: 4.86 MILLION/UL (ref 3.88–5.62)
RH BLD: POSITIVE
SODIUM SERPL-SCNC: 137 MMOL/L (ref 136–145)
SP GR UR STRIP.AUTO: 1.02 (ref 1–1.03)
SPECIMEN EXPIRATION DATE: NORMAL
UROBILINOGEN UR QL STRIP.AUTO: 0.2 E.U./DL
WBC # BLD AUTO: 5.96 THOUSAND/UL (ref 4.31–10.16)

## 2018-10-18 PROCEDURE — 85730 THROMBOPLASTIN TIME PARTIAL: CPT

## 2018-10-18 PROCEDURE — 86900 BLOOD TYPING SEROLOGIC ABO: CPT

## 2018-10-18 PROCEDURE — 36415 COLL VENOUS BLD VENIPUNCTURE: CPT

## 2018-10-18 PROCEDURE — 80053 COMPREHEN METABOLIC PANEL: CPT

## 2018-10-18 PROCEDURE — 86901 BLOOD TYPING SEROLOGIC RH(D): CPT

## 2018-10-18 PROCEDURE — 71046 X-RAY EXAM CHEST 2 VIEWS: CPT

## 2018-10-18 PROCEDURE — 85610 PROTHROMBIN TIME: CPT

## 2018-10-18 PROCEDURE — 81003 URINALYSIS AUTO W/O SCOPE: CPT

## 2018-10-18 PROCEDURE — 86850 RBC ANTIBODY SCREEN: CPT

## 2018-10-18 PROCEDURE — 85025 COMPLETE CBC W/AUTO DIFF WBC: CPT

## 2018-10-18 NOTE — PROGRESS NOTES
PRE-OP PROSTATECTOMY EDUCATION    THE FOLLOWING AREAS WERE DISCUSSED WITH PT AND HE VERBALIZED UNDERSTANDING OF THE INSTRUCTIONS:    MEDICATIONS INCLUDING BLOOD THINNERS: YES    BOWEL PREP: YES    CATHETER DEMO: YES    ELIMINATION: YES    RESTRICTIONS:          SITTING: YES          WALKING: YES          DRIVING: YES          LIFTING: YES          MISCELLANEOUS: YES          SHOWERING: YES    BLOOD CLOTS: YES    J/P DRAIN: YES    DIET: YES    FLUIDS: YES    KEGEL EXERCISES: YES

## 2018-10-18 NOTE — ANESTHESIA PREPROCEDURE EVALUATION
Review of Systems/Medical History  Patient summary reviewed        Cardiovascular  Negative cardio ROS Hyperlipidemia, Hypertension ,   Comment: Borderline HTN  No meds ,  Pulmonary  Negative pulmonary ROS        GI/Hepatic  Negative GI/hepatic ROS          Negative  ROS        Endo/Other  Negative endo/other ROS      GYN  Negative gynecology ROS          Hematology  Negative hematology ROS      Musculoskeletal  Negative musculoskeletal ROS   Arthritis     Neurology  Negative neurology ROS      Psychology   Negative psychology ROS              Physical Exam    Airway    Mallampati score: II  TM Distance: >3 FB  Neck ROM: full     Dental   No notable dental hx     Cardiovascular  Comment: Negative ROS, Rhythm: regular, Rate: normal, Cardiovascular exam normal    Pulmonary  Pulmonary exam normal Breath sounds clear to auscultation,     Other Findings        Anesthesia Plan  ASA Score- 2     Anesthesia Type- general with ASA Monitors  Additional Monitors:   Airway Plan: ETT  Comment: Possible 2 nd  IV post induction   Plan Factors-    Induction- intravenous  Postoperative Plan-     Informed Consent- Anesthetic plan and risks discussed with patient

## 2018-10-18 NOTE — PRE-PROCEDURE INSTRUCTIONS
Pre-Surgery Instructions:   Medication Instructions    aspirin (ADULT ASPIRIN EC LOW STRENGTH) 81 mg EC tablet Patient was instructed by Physician and understands   CIALIS 20 MG tablet Patient was instructed by Physician and understands   ezetimibe-simvastatin (VYTORIN) 10-10 mg per tablet Patient was instructed by Physician and understands   meloxicam (MOBIC) 15 mg tablet Patient was instructed by Physician and understands  Seen by Dr Goins  Patient instructed has no medications that need to be taken morning of surgery  Instructed re chlorhexidine showers per hospital policy

## 2018-10-22 ENCOUNTER — OFFICE VISIT (OUTPATIENT)
Dept: CARDIOLOGY CLINIC | Facility: CLINIC | Age: 63
End: 2018-10-22
Payer: COMMERCIAL

## 2018-10-22 VITALS
BODY MASS INDEX: 25.27 KG/M2 | WEIGHT: 196.9 LBS | SYSTOLIC BLOOD PRESSURE: 128 MMHG | DIASTOLIC BLOOD PRESSURE: 78 MMHG | OXYGEN SATURATION: 98 % | HEIGHT: 74 IN | HEART RATE: 80 BPM

## 2018-10-22 DIAGNOSIS — Z01.818 PRE-OP EXAM: Primary | ICD-10-CM

## 2018-10-22 PROCEDURE — 99242 OFF/OP CONSLTJ NEW/EST SF 20: CPT | Performed by: NURSE PRACTITIONER

## 2018-10-22 PROCEDURE — 93000 ELECTROCARDIOGRAM COMPLETE: CPT | Performed by: NURSE PRACTITIONER

## 2018-10-22 NOTE — PROGRESS NOTES
Heart Failure Office Visit   Yesenia Condon 66 y o  male MRN: [id number]    HPI  Mr Denisha Anna is a 66year old male with a known past medical history of   Patient states known RBBB  Patient Active Problem List   Diagnosis    Arthritis of knee, right    Erectile dysfunction of non-organic origin    Hyperlipidemia    IBS (irritable bowel syndrome)    Nonvenomous insect bite of thorax    Vitamin D deficiency    Elevated PSA    BPH with obstruction/lower urinary tract symptoms    Malignant neoplasm prostate ([address] )    Malignant neoplasm of prostate [facility])     Mr Denisha Anna present to our office today for a pre op clearance for a planned robotic radical prostatectomy to be done on 10/29/18 by Dr Nicho Brown  Mr Denisha Anna has been working out a the gym using the treadmill and ellypitical   He denies chest pain or shortness of breath with this activity  11/13/18 TTE showed LVEF 02-85%, grade 1 diastolic dysfunction, trace TR       11/30/17 Nuclear stress test was normal      Mr Denisha Anna denies tobacco abuse  Admits to drinking 3-4 drinks of beer or wine per night  Denies a family history of CAD or sudden death         ROS- Negative      Objective:   Vitals:   Vitals:    10/22/18 0807   BP: 128/78   BP Location: Right arm   Patient Position: Sitting   Cuff Size: Standard   Pulse: 80   SpO2: 98%   Weight: 89 3 kg (196 lb 14 4 oz)   Height: 6' 2" (1 88 m)     Body mass index is 25 28 kg/m²      Wt Readings from Last 3 Encounters:   10/22/18 89 3 kg (196 lb 14 4 oz)   10/09/18 87 1 kg (192 lb)   10/03/18 87 1 kg (192 lb)         Physical Exam:  GEN: Yesenia Condon appears well, alert and oriented x 3, pleasant and cooperative   HEENT: pupils equal, round, and reactive to light; extraocular muscles intact  NECK: supple, no carotid bruits   HEART: regular rhythm, normal S1 and S2, no murmurs, clicks, gallops or rubs    LUNGS: clear to auscultation bilaterally; no wheezes, rales, or rhonchi   ABDOMEN: normal bowel sounds, soft, no tenderness, no distention  EXTREMITIES: peripheral pulses normal; no clubbing, cyanosis, or edema  NEURO: no focal findings   SKIN: normal without suspicious lesions on exposed skin      Current Outpatient Prescriptions:     aspirin (ADULT ASPIRIN EC LOW STRENGTH) 81 mg EC tablet, Take 1 tablet by mouth daily, Disp: , Rfl:     CIALIS 20 MG tablet, TAKE 1/2 TO 1 TABLET 1 HOURBEFORE ACTIVITY AS DIRECTED, Disp: 18 tablet, Rfl: 2    ezetimibe-simvastatin (VYTORIN) 10-10 mg per tablet, TAKE 1 TABLET DAILY, Disp: 90 tablet, Rfl: 0    lidocaine (XYLOCAINE) 2 % topical gel, Apply 1 application topically as needed for mild pain (Patient not taking: Reported on 10/22/2018 ), Disp: 30 mL, Rfl: 0    [START ON 10/28/2018] polyethylene glycol (GOLYTELY) 4000 mL solution, Take 4,000 mL by mouth once for 1 dose (Patient not taking: Reported on 10/22/2018 ), Disp: 4000 mL, Rfl: 0      Labs & Results:        Results from last 7 days  Lab Units 10/18/18  0932   WBC Thousand/uL 5 96   HEMOGLOBIN g/dL 15 0   HEMATOCRIT % 45 7   PLATELETS Thousands/uL 207           Results from last 7 days  Lab Units 10/18/18  0932   SODIUM mmol/L 137   POTASSIUM mmol/L 3 9   CHLORIDE mmol/L 101   CO2 mmol/L 29   BUN mg/dL 21   CREATININE mg/dL 1 09   CALCIUM mg/dL 9 2   ALK PHOS U/L 71   ALT U/L 62   AST U/L 36     Results from last 7 days  Lab Units 10/18/18  0932   INR  1 01         EKG personally reviewed by GRACE Page  Assessment/Plan:   1  Pre op Clearance for planned 10/29/18  Robotic radical prostatectomy due to adenocarcinoma of the prostate to be done by Dr Celestine Knight  Mr Jason Haney   Has good functional capacity with > 4 mets  Mr Jason Haney is at a low to Intermediate  risk from a cardiovascular standpoint for this procedure  No additional testing needed at this time from a cardiovascular standpoint  2  HLD- 5/23/18 , Decrease ETOH use, continue on Vytorin 10-10mg daily           Leola Nassar GRACE  10/22/2018  8:28 AM

## 2018-10-29 ENCOUNTER — ANESTHESIA (OUTPATIENT)
Dept: PERIOP | Facility: HOSPITAL | Age: 63
DRG: 708 | End: 2018-10-29
Payer: COMMERCIAL

## 2018-10-29 ENCOUNTER — HOSPITAL ENCOUNTER (INPATIENT)
Facility: HOSPITAL | Age: 63
LOS: 1 days | Discharge: HOME HEALTH | DRG: 708 | End: 2018-10-30
Attending: UROLOGY | Admitting: UROLOGY
Payer: COMMERCIAL

## 2018-10-29 DIAGNOSIS — C61 MALIGNANT NEOPLASM OF PROSTATE (HCC): ICD-10-CM

## 2018-10-29 LAB
ABO GROUP BLD: NORMAL
BLD GP AB SCN SERPL QL: NEGATIVE
RH BLD: POSITIVE
SPECIMEN EXPIRATION DATE: NORMAL

## 2018-10-29 PROCEDURE — 86850 RBC ANTIBODY SCREEN: CPT | Performed by: UROLOGY

## 2018-10-29 PROCEDURE — 07BC4ZX EXCISION OF PELVIS LYMPHATIC, PERCUTANEOUS ENDOSCOPIC APPROACH, DIAGNOSTIC: ICD-10-PCS | Performed by: UROLOGY

## 2018-10-29 PROCEDURE — 86900 BLOOD TYPING SEROLOGIC ABO: CPT | Performed by: UROLOGY

## 2018-10-29 PROCEDURE — 88309 TISSUE EXAM BY PATHOLOGIST: CPT | Performed by: PATHOLOGY

## 2018-10-29 PROCEDURE — 90682 RIV4 VACC RECOMBINANT DNA IM: CPT | Performed by: UROLOGY

## 2018-10-29 PROCEDURE — 38571 LAPAROSCOPY LYMPHADENECTOMY: CPT | Performed by: UROLOGY

## 2018-10-29 PROCEDURE — 0VT04ZZ RESECTION OF PROSTATE, PERCUTANEOUS ENDOSCOPIC APPROACH: ICD-10-PCS | Performed by: UROLOGY

## 2018-10-29 PROCEDURE — 88307 TISSUE EXAM BY PATHOLOGIST: CPT | Performed by: PATHOLOGY

## 2018-10-29 PROCEDURE — 55866 LAPS SURG PRST8ECT RPBIC RAD: CPT | Performed by: UROLOGY

## 2018-10-29 PROCEDURE — 8E0W4CZ ROBOTIC ASSISTED PROCEDURE OF TRUNK REGION, PERCUTANEOUS ENDOSCOPIC APPROACH: ICD-10-PCS | Performed by: UROLOGY

## 2018-10-29 PROCEDURE — 38571 LAPAROSCOPY LYMPHADENECTOMY: CPT | Performed by: PHYSICIAN ASSISTANT

## 2018-10-29 PROCEDURE — 55866 LAPS SURG PRST8ECT RPBIC RAD: CPT | Performed by: PHYSICIAN ASSISTANT

## 2018-10-29 PROCEDURE — 86901 BLOOD TYPING SEROLOGIC RH(D): CPT | Performed by: UROLOGY

## 2018-10-29 RX ORDER — OXYBUTYNIN CHLORIDE 5 MG/1
5 TABLET ORAL 4 TIMES DAILY PRN
Status: DISCONTINUED | OUTPATIENT
Start: 2018-10-29 | End: 2018-10-30 | Stop reason: HOSPADM

## 2018-10-29 RX ORDER — ONDANSETRON 2 MG/ML
4 INJECTION INTRAMUSCULAR; INTRAVENOUS EVERY 6 HOURS PRN
Status: DISCONTINUED | OUTPATIENT
Start: 2018-10-29 | End: 2018-10-30 | Stop reason: HOSPADM

## 2018-10-29 RX ORDER — GLYCOPYRROLATE 0.2 MG/ML
INJECTION INTRAMUSCULAR; INTRAVENOUS AS NEEDED
Status: DISCONTINUED | OUTPATIENT
Start: 2018-10-29 | End: 2018-10-29 | Stop reason: SURG

## 2018-10-29 RX ORDER — SODIUM CHLORIDE 9 MG/ML
150 INJECTION, SOLUTION INTRAVENOUS CONTINUOUS
Status: DISCONTINUED | OUTPATIENT
Start: 2018-10-29 | End: 2018-10-30

## 2018-10-29 RX ORDER — PROPOFOL 10 MG/ML
INJECTION, EMULSION INTRAVENOUS AS NEEDED
Status: DISCONTINUED | OUTPATIENT
Start: 2018-10-29 | End: 2018-10-29 | Stop reason: SURG

## 2018-10-29 RX ORDER — SODIUM CHLORIDE 9 MG/ML
INJECTION, SOLUTION INTRAVENOUS CONTINUOUS PRN
Status: DISCONTINUED | OUTPATIENT
Start: 2018-10-29 | End: 2018-10-29 | Stop reason: SURG

## 2018-10-29 RX ORDER — ACETAMINOPHEN 325 MG/1
650 TABLET ORAL EVERY 6 HOURS PRN
Status: DISCONTINUED | OUTPATIENT
Start: 2018-10-29 | End: 2018-10-30 | Stop reason: HOSPADM

## 2018-10-29 RX ORDER — HEPARIN SODIUM 5000 [USP'U]/ML
5000 INJECTION, SOLUTION INTRAVENOUS; SUBCUTANEOUS ONCE
Status: COMPLETED | OUTPATIENT
Start: 2018-10-29 | End: 2018-10-29

## 2018-10-29 RX ORDER — ONDANSETRON 2 MG/ML
INJECTION INTRAMUSCULAR; INTRAVENOUS AS NEEDED
Status: DISCONTINUED | OUTPATIENT
Start: 2018-10-29 | End: 2018-10-29 | Stop reason: SURG

## 2018-10-29 RX ORDER — CIPROFLOXACIN 250 MG/1
250 TABLET, FILM COATED ORAL EVERY 12 HOURS SCHEDULED
Status: DISCONTINUED | OUTPATIENT
Start: 2018-10-30 | End: 2018-10-30 | Stop reason: HOSPADM

## 2018-10-29 RX ORDER — FAMOTIDINE 20 MG/1
20 TABLET, FILM COATED ORAL
Status: DISCONTINUED | OUTPATIENT
Start: 2018-10-29 | End: 2018-10-30 | Stop reason: HOSPADM

## 2018-10-29 RX ORDER — BUPIVACAINE HYDROCHLORIDE 5 MG/ML
INJECTION, SOLUTION PERINEURAL AS NEEDED
Status: DISCONTINUED | OUTPATIENT
Start: 2018-10-29 | End: 2018-10-29 | Stop reason: HOSPADM

## 2018-10-29 RX ORDER — HYDROMORPHONE HCL/PF 1 MG/ML
0.3 SYRINGE (ML) INJECTION
Status: DISCONTINUED | OUTPATIENT
Start: 2018-10-29 | End: 2018-10-30

## 2018-10-29 RX ORDER — DOCUSATE SODIUM 100 MG/1
100 CAPSULE, LIQUID FILLED ORAL 2 TIMES DAILY
Status: DISCONTINUED | OUTPATIENT
Start: 2018-10-29 | End: 2018-10-30 | Stop reason: HOSPADM

## 2018-10-29 RX ORDER — SODIUM CHLORIDE 9 MG/ML
125 INJECTION, SOLUTION INTRAVENOUS CONTINUOUS
Status: CANCELLED | OUTPATIENT
Start: 2018-10-29

## 2018-10-29 RX ORDER — FENTANYL CITRATE 50 UG/ML
INJECTION, SOLUTION INTRAMUSCULAR; INTRAVENOUS AS NEEDED
Status: DISCONTINUED | OUTPATIENT
Start: 2018-10-29 | End: 2018-10-29 | Stop reason: SURG

## 2018-10-29 RX ORDER — HYDROMORPHONE HYDROCHLORIDE 2 MG/ML
INJECTION, SOLUTION INTRAMUSCULAR; INTRAVENOUS; SUBCUTANEOUS AS NEEDED
Status: DISCONTINUED | OUTPATIENT
Start: 2018-10-29 | End: 2018-10-29 | Stop reason: SURG

## 2018-10-29 RX ORDER — BACITRACIN, NEOMYCIN, POLYMYXIN B 400; 3.5; 5 [USP'U]/G; MG/G; [USP'U]/G
1 OINTMENT TOPICAL 2 TIMES DAILY
Status: DISCONTINUED | OUTPATIENT
Start: 2018-10-29 | End: 2018-10-30 | Stop reason: HOSPADM

## 2018-10-29 RX ORDER — OXYCODONE HYDROCHLORIDE AND ACETAMINOPHEN 5; 325 MG/1; MG/1
1 TABLET ORAL EVERY 4 HOURS PRN
Status: COMPLETED | OUTPATIENT
Start: 2018-10-29 | End: 2018-10-30

## 2018-10-29 RX ORDER — METOCLOPRAMIDE HYDROCHLORIDE 5 MG/ML
10 INJECTION INTRAMUSCULAR; INTRAVENOUS EVERY 6 HOURS SCHEDULED
Status: DISCONTINUED | OUTPATIENT
Start: 2018-10-29 | End: 2018-10-30 | Stop reason: HOSPADM

## 2018-10-29 RX ORDER — KETOROLAC TROMETHAMINE 30 MG/ML
INJECTION, SOLUTION INTRAMUSCULAR; INTRAVENOUS AS NEEDED
Status: DISCONTINUED | OUTPATIENT
Start: 2018-10-29 | End: 2018-10-29 | Stop reason: SURG

## 2018-10-29 RX ORDER — FENTANYL CITRATE/PF 50 MCG/ML
50 SYRINGE (ML) INJECTION
Status: DISCONTINUED | OUTPATIENT
Start: 2018-10-29 | End: 2018-10-29 | Stop reason: HOSPADM

## 2018-10-29 RX ORDER — ONDANSETRON 2 MG/ML
4 INJECTION INTRAMUSCULAR; INTRAVENOUS ONCE AS NEEDED
Status: DISCONTINUED | OUTPATIENT
Start: 2018-10-29 | End: 2018-10-29 | Stop reason: HOSPADM

## 2018-10-29 RX ORDER — SIMETHICONE 80 MG
80 TABLET,CHEWABLE ORAL EVERY 6 HOURS PRN
Status: DISCONTINUED | OUTPATIENT
Start: 2018-10-29 | End: 2018-10-30 | Stop reason: HOSPADM

## 2018-10-29 RX ORDER — ROCURONIUM BROMIDE 10 MG/ML
INJECTION, SOLUTION INTRAVENOUS AS NEEDED
Status: DISCONTINUED | OUTPATIENT
Start: 2018-10-29 | End: 2018-10-29 | Stop reason: SURG

## 2018-10-29 RX ORDER — MIDAZOLAM HYDROCHLORIDE 1 MG/ML
INJECTION INTRAMUSCULAR; INTRAVENOUS AS NEEDED
Status: DISCONTINUED | OUTPATIENT
Start: 2018-10-29 | End: 2018-10-29 | Stop reason: SURG

## 2018-10-29 RX ORDER — HEPARIN SODIUM 5000 [USP'U]/ML
5000 INJECTION, SOLUTION INTRAVENOUS; SUBCUTANEOUS EVERY 8 HOURS SCHEDULED
Status: DISCONTINUED | OUTPATIENT
Start: 2018-10-29 | End: 2018-10-30 | Stop reason: HOSPADM

## 2018-10-29 RX ORDER — KETOROLAC TROMETHAMINE 30 MG/ML
30 INJECTION, SOLUTION INTRAMUSCULAR; INTRAVENOUS EVERY 6 HOURS SCHEDULED
Status: DISCONTINUED | OUTPATIENT
Start: 2018-10-29 | End: 2018-10-30 | Stop reason: HOSPADM

## 2018-10-29 RX ORDER — DEXAMETHASONE SODIUM PHOSPHATE 4 MG/ML
INJECTION, SOLUTION INTRA-ARTICULAR; INTRALESIONAL; INTRAMUSCULAR; INTRAVENOUS; SOFT TISSUE AS NEEDED
Status: DISCONTINUED | OUTPATIENT
Start: 2018-10-29 | End: 2018-10-29 | Stop reason: SURG

## 2018-10-29 RX ADMIN — ONDANSETRON HYDROCHLORIDE 4 MG: 2 INJECTION, SOLUTION INTRAVENOUS at 08:02

## 2018-10-29 RX ADMIN — DOCUSATE SODIUM 100 MG: 100 CAPSULE, LIQUID FILLED ORAL at 13:43

## 2018-10-29 RX ADMIN — CEFAZOLIN SODIUM 1000 MG: 1 SOLUTION INTRAVENOUS at 16:50

## 2018-10-29 RX ADMIN — HYDROMORPHONE HYDROCHLORIDE 0.5 MG: 2 INJECTION, SOLUTION INTRAMUSCULAR; INTRAVENOUS; SUBCUTANEOUS at 08:34

## 2018-10-29 RX ADMIN — SODIUM CHLORIDE 150 ML/HR: 0.9 INJECTION, SOLUTION INTRAVENOUS at 06:11

## 2018-10-29 RX ADMIN — PROPOFOL 200 MG: 10 INJECTION, EMULSION INTRAVENOUS at 07:47

## 2018-10-29 RX ADMIN — KETOROLAC TROMETHAMINE 30 MG: 30 INJECTION, SOLUTION INTRAMUSCULAR at 19:19

## 2018-10-29 RX ADMIN — SODIUM CHLORIDE: 0.9 INJECTION, SOLUTION INTRAVENOUS at 10:55

## 2018-10-29 RX ADMIN — HEPARIN SODIUM 5000 UNITS: 5000 INJECTION INTRAVENOUS; SUBCUTANEOUS at 13:42

## 2018-10-29 RX ADMIN — FAMOTIDINE 20 MG: 20 TABLET ORAL at 16:50

## 2018-10-29 RX ADMIN — NEOSTIGMINE METHYLSULFATE 4 MG: 1 INJECTION, SOLUTION INTRAMUSCULAR; INTRAVENOUS; SUBCUTANEOUS at 11:17

## 2018-10-29 RX ADMIN — DOCUSATE SODIUM 100 MG: 100 CAPSULE, LIQUID FILLED ORAL at 19:20

## 2018-10-29 RX ADMIN — LIDOCAINE HYDROCHLORIDE 80 MG: 20 INJECTION, SOLUTION INTRAVENOUS at 07:47

## 2018-10-29 RX ADMIN — BACITRACIN ZINC, NEOMYCIN SULFATE, AND POLYMYXIN B SULFATE 1 SMALL APPLICATION: 400; 3.5; 5 OINTMENT TOPICAL at 19:20

## 2018-10-29 RX ADMIN — SODIUM CHLORIDE 500 ML: 0.9 INJECTION, SOLUTION INTRAVENOUS at 11:52

## 2018-10-29 RX ADMIN — DEXAMETHASONE SODIUM PHOSPHATE 4 MG: 4 INJECTION, SOLUTION INTRAMUSCULAR; INTRAVENOUS at 08:02

## 2018-10-29 RX ADMIN — HYDROMORPHONE HYDROCHLORIDE 0.5 MG: 2 INJECTION, SOLUTION INTRAMUSCULAR; INTRAVENOUS; SUBCUTANEOUS at 11:36

## 2018-10-29 RX ADMIN — BACITRACIN ZINC, NEOMYCIN SULFATE, AND POLYMYXIN B SULFATE 1 SMALL APPLICATION: 400; 3.5; 5 OINTMENT TOPICAL at 13:43

## 2018-10-29 RX ADMIN — FENTANYL CITRATE 150 MCG: 50 INJECTION, SOLUTION INTRAMUSCULAR; INTRAVENOUS at 07:47

## 2018-10-29 RX ADMIN — ROCURONIUM BROMIDE 20 MG: 10 INJECTION INTRAVENOUS at 08:19

## 2018-10-29 RX ADMIN — METOCLOPRAMIDE 10 MG: 5 INJECTION, SOLUTION INTRAMUSCULAR; INTRAVENOUS at 13:43

## 2018-10-29 RX ADMIN — SODIUM CHLORIDE: 0.9 INJECTION, SOLUTION INTRAVENOUS at 07:52

## 2018-10-29 RX ADMIN — MIDAZOLAM 2 MG: 1 INJECTION INTRAMUSCULAR; INTRAVENOUS at 07:44

## 2018-10-29 RX ADMIN — SODIUM CHLORIDE: 0.9 INJECTION, SOLUTION INTRAVENOUS at 07:34

## 2018-10-29 RX ADMIN — METOCLOPRAMIDE 10 MG: 5 INJECTION, SOLUTION INTRAMUSCULAR; INTRAVENOUS at 19:20

## 2018-10-29 RX ADMIN — KETOROLAC TROMETHAMINE 30 MG: 30 INJECTION, SOLUTION INTRAMUSCULAR at 11:17

## 2018-10-29 RX ADMIN — MIDAZOLAM 2 MG: 1 INJECTION INTRAMUSCULAR; INTRAVENOUS at 07:38

## 2018-10-29 RX ADMIN — GLYCOPYRROLATE 0.8 MG: 0.2 INJECTION, SOLUTION INTRAMUSCULAR; INTRAVENOUS at 11:17

## 2018-10-29 RX ADMIN — ROCURONIUM BROMIDE 10 MG: 10 INJECTION INTRAVENOUS at 09:33

## 2018-10-29 RX ADMIN — SODIUM CHLORIDE 150 ML/HR: 0.9 INJECTION, SOLUTION INTRAVENOUS at 12:23

## 2018-10-29 RX ADMIN — CEFAZOLIN SODIUM 2000 MG: 2 SOLUTION INTRAVENOUS at 08:06

## 2018-10-29 RX ADMIN — OXYCODONE HYDROCHLORIDE AND ACETAMINOPHEN 1 TABLET: 5; 325 TABLET ORAL at 22:55

## 2018-10-29 RX ADMIN — FENTANYL CITRATE 50 MCG: 50 INJECTION, SOLUTION INTRAMUSCULAR; INTRAVENOUS at 08:19

## 2018-10-29 RX ADMIN — KETOROLAC TROMETHAMINE 30 MG: 30 INJECTION, SOLUTION INTRAMUSCULAR at 13:42

## 2018-10-29 RX ADMIN — OXYCODONE HYDROCHLORIDE AND ACETAMINOPHEN 1 TABLET: 5; 325 TABLET ORAL at 16:51

## 2018-10-29 RX ADMIN — HYDROMORPHONE HYDROCHLORIDE 0.5 MG: 2 INJECTION, SOLUTION INTRAMUSCULAR; INTRAVENOUS; SUBCUTANEOUS at 11:10

## 2018-10-29 RX ADMIN — HEPARIN SODIUM 5000 UNITS: 5000 INJECTION INTRAVENOUS; SUBCUTANEOUS at 06:48

## 2018-10-29 RX ADMIN — INFLUENZA A VIRUS A/MICHIGAN/45/2015 (H1N1) RECOMBINANT HEMAGGLUTININ ANTIGEN, INFLUENZA A VIRUS A/SINGAPORE/INFIMH-16-0019/2016 (H3N2) RECOMBINANT HEMAGGLUTININ ANTIGEN, INFLUENZA B VIRUS B/MARYLAND/15/2016 RECOMBINANT HEMAGGLUTININ ANTIGEN, AND INFLUENZA B VIRUS B/PHUKET/3073/2013 RECOMBINANT HEMAGGLUTININ ANTIGEN 0.5 ML: 45; 45; 45; 45 INJECTION INTRAMUSCULAR at 16:52

## 2018-10-29 RX ADMIN — HYDROMORPHONE HYDROCHLORIDE 0.5 MG: 2 INJECTION, SOLUTION INTRAMUSCULAR; INTRAVENOUS; SUBCUTANEOUS at 09:22

## 2018-10-29 RX ADMIN — HEPARIN SODIUM 5000 UNITS: 5000 INJECTION INTRAVENOUS; SUBCUTANEOUS at 22:48

## 2018-10-29 RX ADMIN — ROCURONIUM BROMIDE 50 MG: 10 INJECTION INTRAVENOUS at 07:47

## 2018-10-29 RX ADMIN — SODIUM CHLORIDE 150 ML/HR: 0.9 INJECTION, SOLUTION INTRAVENOUS at 20:31

## 2018-10-29 NOTE — H&P (VIEW-ONLY)
H&P Exam - Urology   Emmanuel Jones 66 y o  male MRN: [id number]      Assessment/Plan     Assessment:  Prostate cancer    PLAN:  After careful review and summary of the medical history and all laboratory and radiographic studies as outlined above, I discussed with the patient and family members at length the finding of adenocarcinoma of the prostate  We discussed the patient's grade (Abbey score 6), other parameters, and clinical stage based upon the above findings  I then discussed the options available to him at this time, based on his clinically localized prostate cancer  These include observation, radiation therapy, radical prostatectomy and cryosurgery  Regarding observation, they understand this is ideally suited for men with either clinically insignificant or slow growing cancer or for whom because of age or other concurrent medical conditions the risks of treatment are greater than the potential benefits of treatment  While this option avoids treatment-associated side effects, I explained it requires frequent evaluation in order to monitor for potential disease progression, and that despite vigilant follow-up there will be cases where the cancer spreads and is no longer potentially curable with local therapies  I reiterated that for many men, watchful waiting can be associated with increased anxiety and stress concerning their diagnosis, and that commonly patients only defer definitive treatment to a later date  THE PATIENT STATES HE HAS ABSOLUTELY NO INTERESTED IN THE ACTIVE SURVEILLANCE OPTION  With regard to radiation therapy, I reviewed the options available  These include external beam radiation therapy versus brachytherapy   Regarding XRT, we discussed the differences in conventional versus 3D conformal external beam radiation therapy versus IMRT   The indications, risks and potential complications of each of these options as well as the rationale for using selective radiation options were reviewed in detail  We also discussed interstitial seed therapy and risk stratification as a determinant for optimal therapies  All questions were again answered  With regard to the surgical options, we reviewed the different options including a radical perineal prostatectomy versus the radical retropubic prostatectomy via open, laparoscopic and robotic approach  I explained the operations that I primarily perform, robotic-assisted laparoscopic radical prostatectomy, and compared it with the alternative that I am also experienced with, traditional open retropubic prostatectomy  I also mentioned the less commonly used but accepted perineal approach  The advantages and limitations of these various approaches were described in detail  The anticipated hospital and post-operative courses were reviewed  I highlighted the relevant anatomy, and we discussed the importance of neurovascular bundle preservation (nerve-sparing) to minimize negative effects on erectile function and continence  While in the majority of cases bilateral nerve-sparing is appropriate and possible, they understand that in certain circumstances intentional partial or complete unilateral or bilateral neurovascular bundle resection is necessary when there is suspicion that cancer extends into that region; this may be determined either preoperatively or intraoperatively with or without biopsy  The patient is clearly aware that he may still be rendered sexually impotent and incontinent as a consequence of the operative procedure  The possibility of stress-type urinary incontinence associated with either form of radical prostatectomy was also detailed for the patient  I explained that concurrent pelvic lymphadenectomy may be performed, for intermediate, or high risk clinical disease or suspicious intraoperative adenopathy, which serves both a diagnostic and potentially therapeutic purpose if metastatic disease is identified   We discussed the general risks of surgery, which include but are not limited to infection, bleeding, medical and anesthetic complications, and adjacent organ involvement or injury  With regard to cryosurgery, this is felt to be an experimental option at this time secondary to insufficient data to support its routine use in this clinical setting  Regarding hormonal therapy, we discussed it is primarily used as systemic therapy for patients with advanced or metastatic disease, but that it also has been shown to be beneficial in conjunction with radiation therapy for certain categories of patients  The patient understands that hormonal therapy alone is not considered curative treatment and will slow the growth of but not eliminate prostate cancer  The patient was advised to become an active participant in their care and to become proactive in their care  They were encouraged to call my office with any unanswered questions or seek additional medical opinions at their discretion  They understand that the decision as to which approach to take is one made based on the medical risks and benefits as well as their own informed tolerance of this risk  Once the patient has decided to proceed with a RALP/PLND, he will need preadmission testing and anesthesia clearance  Kegel exercises were reviewed, and he may be seen, pre operatively, by our continence nurse  I have discussed the risks, benefits and alternatives to the proposed procedure/treatment plan with the patient   Our discussion also included the risks and benefits of the alternatives, including doing nothing  He was encouraged to ask questions and all questions were answered to their satisfaction          History of Present Illness   HPI:  Yesenia Centeno is a 66 y o  male who presented with a PSA of 4 7 a percent free PSA of 17%, and was prostate biopsied by Dr Shane Tsai where the specimen was found to have 6 of 12 cores of Chariton score 6 cancer    The patient was referred for consultation of the management options for his cancer  He denies any urinary symptoms such as obstructive voiding symptoms with the need to strain or bear down to generate a urinary stream, nor has he had any symptoms of dysuria, hematuria, flank or abdominal pains  He states that he has a normal appetite, good bowel function and has not had any unintended weight loss  HE RECENTLY CONSULTED WITH THE RADIATION ONCOLOGIST AND THOROUGHLY DISCUSSED THAT OPTION  HE HAS CHOSEN TO PROCEED WITH SURGERY  Prostate biopsy from 08/17/2018: Final Diagnosis   A  Prostate, Left lateral base needle biopsy:    -Small focus of adenocarcinoma of the prostate  , confirmed by  stain  - Abbey grade 3+3=6   -Tumor extent: single focus in one of one submitted core  -Tumor volume: tumor represents approximately 5% of biopsy volume   -Perineural invasion: not identified     B  Prostate, Left base,  needle biopsy:    -Benign prostatic tissue,     C  Prostate, Right base needle biopsy:    -Benign prostatic tissue with mild chronic inflammation     D  Prostate, Right lateral base needle biopsy:    -Prostatic tissue with  Small focus of atypical prostatic glands      E  Prostate, Left lateral mid, needle biopsy:  -Small focus of adenocarcinoma of the prostate, confirmed by triple stain  - Abbey grade 3+3=6   -Tumor extent: single focus in one of one submitted core  -Tumor volume: tumor represents approximately 5% of biopsy volume   -Perineural invasion: not identified     F  Prostate, Left mid, needle biopsy:    -Benign prostatic tissue     G  Prostate, Right mid needle biopsy:    - Benign Prostatic tissue confirmed by  stain      H   Prostate, Right lateral mid , needle biopsy:  Adenocarcinoma of the prostate, confirmed by triple stain  - Abbey grade 3+3=6   -Tumor extent: single focus in one of one submitted core  -Tumor volume: tumor represents approximately 20% of biopsy volume   -Perineural invasion: not identified        I  Prostate, Left lateral apex , needle biopsy:  -Adenocarcinoma of the prostate, confirmed by   stain  - Abbey grade 3+3=6   -Tumor extent: single focus in one of one submitted core  Tumor volume: tumor represents approximately 15% of biopsy volume   -Perineural invasion: not identified     J  Prostate, Left apex needle biopsy:    -Benign prostatic tissue     K  Prostate, Right apex needle biopsy:  -Small focus of adenocarcinoma of the prostate, confirmed by triple stain  - Abbey grade 3+3=6   -Tumor extent: single focus in one of one submitted core  -Tumor volume: tumor represents approximately 5% of biopsy volume   -Perineural invasion: not identified     L  Prostate, Right lateral apex needle biopsy:  -Small foci of adenocarcinoma of the prostate, confirmed by   stain  - Hampton grade 3+3=6   -Tumor extent: three foci in one of one submitted core  -Tumor volume: tumor foci represents approximately 5%, 5%, 10% of one core biopsy   -Perineural invasion: not identified   TRUS volume of his prostate was 25 cc      Review of Systems   Constitutional: Negative  HENT: Negative  Eyes: Negative  Respiratory: Negative  Cardiovascular: Negative  Gastrointestinal: Negative  Endocrine: Negative  Genitourinary: Negative  Musculoskeletal: Negative  Skin: Negative  Allergic/Immunologic: Negative  Neurological: Negative  Hematological: Negative  Psychiatric/Behavioral: Negative          Historical Information   Past Medical History:   Diagnosis Date    Abnormal cardiovascular stress test     last assessed-11/13/2015    Abnormal electrocardiogram     last assessed-11/2/2015    Arthritis     great toes    Benign essential hypertension     last assessed-5/1/2017    Elevated PSA     Elevated PSA     Encounter for screening colonoscopy for non-high-risk patient     family hx possible of colon ca    Hallux rigidus     last assessed-4/24/2014    Hyperlipidemia     Irregular heart beat     "rapid heart beat episode X1" - tested - no issue    Organic impotence     last assessed-4/24/2014    Rapid pulse     resolved-1/19/2016    Skin lesion     last assessed-5/1/2017    Tachycardia     last assessed-11/2/2015     Past Surgical History:   Procedure Laterality Date    BACK SURGERY      L4-L5    COLONOSCOPY      X 3-4     OTHER SURGICAL HISTORY      spinal diskectomy    [place] COLONOSCOPY FLX DX W/COLLJ SPEC WHEN PFRMD N/A 9/30/2016    Procedure: COLONOSCOPY;  Surgeon: Patrick Norris MD;  Location: [facility]; Service: Colorectal    PROSTATE BIOPSY Bilateral 08/2018    REDUCTION OF TORSION OF TESTIS  2001    VASECTOMY       Social History   History   Alcohol Use    12 6 - 16 8 oz/week    21 - 28 Standard drinks or equivalent per week     Comment: Moderate drinker; 3-4 drinks daily     History   Drug Use No     History   Smoking Status    Former Smoker    Packs/day: 0 50    Years: 9 00    Quit date: 1984   Smokeless Tobacco    Never Used     Family History:   Family History   Problem Relation Age of Onset    Cancer Mother     Hepatitis Father         Hepatitis C    Dementia Father        Meds/Allergies   all medications and allergies reviewed  No Known Allergies    Objective   Vitals: Height 6' 2" (1 88 m), weight 87 1 kg (192 lb)  Physical Exam   Constitutional: He is oriented to person, place, and time  He appears well-developed and well-nourished  No distress  HENT:   Head: Normocephalic and atraumatic  Nose: Nose normal    Mouth/Throat: Oropharynx is clear and moist    Eyes: Pupils are equal, round, and reactive to light  Conjunctivae and EOM are normal  No scleral icterus  Neck: Normal range of motion  Neck supple  Cardiovascular: Normal rate, regular rhythm, normal heart sounds and intact distal pulses  No murmur heard  Pulmonary/Chest: Effort normal and breath sounds normal  No respiratory distress  He has no wheezes   He has no rales  Abdominal: Soft  Bowel sounds are normal  He exhibits no distension and no mass  There is no tenderness  Genitourinary: Prostate normal and penis normal    Musculoskeletal: Normal range of motion  He exhibits no edema or tenderness  Lymphadenopathy:     He has no cervical adenopathy  Neurological: He is alert and oriented to person, place, and time  No cranial nerve deficit  Skin: Skin is warm and dry  No rash noted  No erythema  No pallor  Psychiatric: He has a normal mood and affect  His behavior is normal  Judgment and thought content normal    Nursing note and vitals reviewed  Lab Results: I have personally reviewed pertinent reports  CBC: No results found for: WBC, HGB, HCT, MCV, PLT, ADJUSTEDWBC, MCH, MCHC, RDW, MPV, NRBC  CMP: No results found for: NA, CL, CO2, ANIONGAP, BUN, CREATININE, GLUCOSE, CALCIUM, AST, ALT, ALKPHOS, PROT, ALBUMIN, BILITOT, EGFR  Urinalysis: No results found for: Ival Copier, SPECGRAV, PHUR, LEUKOCYTESUR, NITRITE, PROTEINUA, GLUCOSEU, KETONESU, BILIRUBINUR, BLOODU  Imaging: I have personally reviewed pertinent reports  and I have personally reviewed pertinent films in PACS  EKG, Pathology, and Other Studies: I have personally reviewed pertinent reports  Counseling / Coordination of Care  Total floor / unit time spent today 80 minutes  Greater than 50% of total time was spent with the patient and / or family counseling and / or coordination of care

## 2018-10-29 NOTE — OP NOTE
OPERATIVE REPORT  PATIENT NAME: Tremaine Lopez    :  1955  MRN: [id number]  Pt Location: [facility] OR ROOM 06    SURGERY DATE: 10/29/2018    Surgeon(s) and Role:     * Sandre Skiff, MD - Primary     * Pradeep Garcia PA-C - [place], PA-C - Assisting     * Fiorella Cleaning PA-C - Observing  *The physician assistant was medically necessary and integral during the entire procedure to help maintain retraction irrigation suction an instrument insertion during the procedure  Preop Diagnosis:  Malignant neoplasm of prostate ([address] ) [C61]    Post-Op Diagnosis Codes:     * Malignant neoplasm of prostate ([address] ) [C61]    Procedure(s) (LRB):  PROSTATECTOMY RADICAL W ROBOTICS; BILATERAL PELVIC LYMPH NODE DISSECTION; LYSIS OF ADHESIONS 30 minutes  Specimen(s):  ID Type Source Tests Collected by Time Destination   1 :  Tissue Prostate TISSUE EXAM Sandre Skiff, MD 10/29/2018 1043    2 : BILATERAL PELVIC LYMPH NODES Tissue Lymph Node TISSUE EXAM Sandre Skiff, MD 10/29/2018 1043        Estimated Blood Loss:   Minimal    Drains:  Closed/Suction Drain Right Abdomen Bulb 10 Fr  (Active)   Number of days: 0       Urethral Catheter Latex 20 Fr  (Active)   Number of days: 0       Anesthesia Type:   General    Operative Indications:  Malignant neoplasm of prostate ([address] ) [S42]    Complications:   None    Procedure and Technique:  After in formed consent including risks of bleeding, infection, bowel/vascular injury, urinary incontinence, impotence, disease recurrence and need for secondary procedures, patient was properly identified and placed supine in the operating room theater  General anesthesia was administered  He was then placed in the low dorsal lithotomy position with arms tucked at the side care to pad all contact points  Sterilely prepped and draped in usual fashion  A #20 Hebrew Parra catheter was placed  0 5% marcaine was used to infiltrate the skin of all planned cannula/port sites    A 8 mm tavares-umbilical incision was made with a Bovie  A Veress needle was introduced and the abdomen insufflated to 15 mm Hg  A 8 mm visual trocar was used to insert the laparoscopic camera cannula without any difficulty  Laparoscopy was performed showing no injury to any intra-abdominal structures    There were significant intra-pelvic adhesions, primarily in the left lower quadrants  The remaining trochars were placed at this time  Two 8 mm trochars were placed lateral to the umbilicus in the midclavicular line bilaterally  A 8 mm trochar was placed above the left iliac crest  A 12 mm trocar was placed above the right iliac crest  The patient was placed in steep Trendelenburg position, and the robot tower was then docked  All robotic instruments were then placed under direct visual guidance  Surgical dissection was initiated by perform in and extensive lysis of adhesions those mentioned above, which took approximately 30 min  The intended procedure was then initiated by  mobilizing the bladder, which was sharply, but mostly bluntly mobilized from its anterior peritoneal attachments and posterior rectus fascia without issue  The space of Retzius was entered and fibrofatty tissue overlying the prostate was swept in the cephalad direction  Bilateral endopelvic fascia were incised, working from the base of the prostate toward the apex  With the apex reached, the puboprostatic ligaments were appreciated and the partially divided to access the dorsal venous complex  That was oversewn with an 0-Vicryl ligature in a slip knot fashion  The prostatovesical junction was placed on traction with the 3rd robotic arm Cobra grasper  It was divided sharply with electrocautery in a bladder neck sparing approach  The abel catheter seen, and was then withdrawn and reflected anteriorly on traction to expose the posterior bladder neck    This posterior bladder neck was incised in a similar manner, with care to not involve the ureteral orifices, which were observed bilaterally  Dissection proceeded distally further until the seminal vesicles and vasa were accessed in their respective recesses  The vasa were transected and the seminal vesicles dissected out in their entirety, and Denonvilliers fascia was transversed  The posterior aspect of the prostate was bluntly mobilized further off the anterior rectal wall, without difficulty, in the midline, up to the prostatic apex  The neurovascular bundles (NVB) on the right and left were spared and reflected off and away from the prostatic vascular pedicle from the base of the prostate to it's apex  Care was taken to not violate the prostate capsule near the bilateral apical regions  Once the prostatic vascular pedicles were isolated away from the NVB's, they were sequentially avascularly divided with a self contained energy Endoseal device  This accomplished complete mobilization of the prostate and seminal vesicle specimen, with the exception of it's attachment to the dorsal venous complex and urethra  A second dorsal venous complex 0- vicryl ligature was placed, and it and the urethra were then sharply transected, detaching the entire specimen  The operative site was liberally irrigated, and inspected for excellent hemostasis  There was good preservation of the NVB's and no inadvertent defect or injury seen in the anterior rectal wall  The specimen was temporarily set in it's fossa, and bilateral pelvic lymph node dissections were performed employing the standard boundaries of such  Care was taken to not traumatize the obturator nerves  All specimens were placed within an Endo-Catch bag, and moved away from the operative site  The bladder neck was of the proper aperture, not requiring tapering  The vesicourethral anastomosis was performed in the standard tension-free running 360 degree, suturing fashion using a double-armed 3-0 Monocryl suture     A second 20 Fr  Parra was placed past the anastomosis by the assistant without difficulty, and liberally irrigated showing no evidence of extravasation  The specimen bag was brought towards the midline  A #10 flat ATIF drain was placed perivesically, and the robot tower was undocked  The patient was taken out of Trendelenburg position, the abdomen was deinsufflated, and all trocars were removed  The ATIF drain tubing, that had been brought through a robot cannula site, was anchored to the skin with 2-0 nylon suture  The midline camera cannula fascial incision site was the extended slightly to facilitate delivery of the specimen bag  That fascia was closed with a running 0-vicryl suture  All skin incision edges were opposed and closed with subcuticular 4-0 Monocryl, and then Histacryl  Lap, sponge and needle count was correct x 2  The patient awakened from anesthesia having tolerated the procedure well and taken to PACU in stable condition         Patient Disposition:  PACU     SIGNATURE: Em Townsend MD  DATE: October 29, 2018  TIME: 11:38 AM

## 2018-10-30 VITALS
RESPIRATION RATE: 18 BRPM | BODY MASS INDEX: 25.92 KG/M2 | DIASTOLIC BLOOD PRESSURE: 91 MMHG | SYSTOLIC BLOOD PRESSURE: 151 MMHG | HEIGHT: 74 IN | TEMPERATURE: 97.5 F | HEART RATE: 82 BPM | WEIGHT: 201.94 LBS | OXYGEN SATURATION: 97 %

## 2018-10-30 LAB
ANION GAP SERPL CALCULATED.3IONS-SCNC: 7 MMOL/L (ref 4–13)
BUN SERPL-MCNC: 11 MG/DL (ref 5–25)
CALCIUM SERPL-MCNC: 8 MG/DL (ref 8.3–10.1)
CHLORIDE SERPL-SCNC: 105 MMOL/L (ref 100–108)
CO2 SERPL-SCNC: 25 MMOL/L (ref 21–32)
CREAT SERPL-MCNC: 1.03 MG/DL (ref 0.6–1.3)
ERYTHROCYTE [DISTWIDTH] IN BLOOD BY AUTOMATED COUNT: 13 % (ref 11.6–15.1)
GFR SERPL CREATININE-BSD FRML MDRD: 78 ML/MIN/1.73SQ M
GLUCOSE SERPL-MCNC: 107 MG/DL (ref 65–140)
HCT VFR BLD AUTO: 37.6 % (ref 36.5–49.3)
HGB BLD-MCNC: 12.7 G/DL (ref 12–17)
MCH RBC QN AUTO: 32 PG (ref 26.8–34.3)
MCHC RBC AUTO-ENTMCNC: 33.8 G/DL (ref 31.4–37.4)
MCV RBC AUTO: 95 FL (ref 82–98)
PLATELET # BLD AUTO: 157 THOUSANDS/UL (ref 149–390)
PMV BLD AUTO: 10.1 FL (ref 8.9–12.7)
POTASSIUM SERPL-SCNC: 3.6 MMOL/L (ref 3.5–5.3)
RBC # BLD AUTO: 3.97 MILLION/UL (ref 3.88–5.62)
SODIUM SERPL-SCNC: 137 MMOL/L (ref 136–145)
WBC # BLD AUTO: 8.97 THOUSAND/UL (ref 4.31–10.16)

## 2018-10-30 PROCEDURE — 85027 COMPLETE CBC AUTOMATED: CPT | Performed by: UROLOGY

## 2018-10-30 PROCEDURE — 99024 POSTOP FOLLOW-UP VISIT: CPT | Performed by: NURSE PRACTITIONER

## 2018-10-30 PROCEDURE — 80048 BASIC METABOLIC PNL TOTAL CA: CPT | Performed by: UROLOGY

## 2018-10-30 RX ORDER — OXYCODONE HYDROCHLORIDE 5 MG/1
5 TABLET ORAL EVERY 6 HOURS PRN
Qty: 40 TABLET | Refills: 0 | Status: SHIPPED | OUTPATIENT
Start: 2018-10-30 | End: 2018-11-09

## 2018-10-30 RX ORDER — OXYBUTYNIN CHLORIDE 5 MG/1
5 TABLET ORAL 3 TIMES DAILY
Qty: 30 TABLET | Refills: 0 | Status: SHIPPED | OUTPATIENT
Start: 2018-10-30 | End: 2019-01-31 | Stop reason: ALTCHOICE

## 2018-10-30 RX ORDER — CIPROFLOXACIN 250 MG/1
250 TABLET, FILM COATED ORAL EVERY 12 HOURS SCHEDULED
Qty: 14 TABLET | Refills: 0 | Status: SHIPPED | OUTPATIENT
Start: 2018-10-30 | End: 2018-11-06

## 2018-10-30 RX ORDER — DOCUSATE SODIUM 100 MG/1
100 CAPSULE, LIQUID FILLED ORAL 2 TIMES DAILY
Qty: 20 CAPSULE | Refills: 0 | Status: SHIPPED | OUTPATIENT
Start: 2018-10-30 | End: 2019-01-31 | Stop reason: ALTCHOICE

## 2018-10-30 RX ORDER — CIPROFLOXACIN 250 MG/1
500 TABLET, FILM COATED ORAL EVERY 12 HOURS SCHEDULED
Qty: 20 TABLET | Refills: 0 | Status: SHIPPED | OUTPATIENT
Start: 2018-10-30 | End: 2018-10-30

## 2018-10-30 RX ORDER — FAMOTIDINE 20 MG/1
20 TABLET, FILM COATED ORAL DAILY
Qty: 10 TABLET | Refills: 0 | Status: SHIPPED | OUTPATIENT
Start: 2018-10-30 | End: 2019-01-31 | Stop reason: ALTCHOICE

## 2018-10-30 RX ADMIN — HEPARIN SODIUM 5000 UNITS: 5000 INJECTION INTRAVENOUS; SUBCUTANEOUS at 06:05

## 2018-10-30 RX ADMIN — METOCLOPRAMIDE 10 MG: 5 INJECTION, SOLUTION INTRAMUSCULAR; INTRAVENOUS at 06:05

## 2018-10-30 RX ADMIN — METOCLOPRAMIDE 10 MG: 5 INJECTION, SOLUTION INTRAMUSCULAR; INTRAVENOUS at 11:51

## 2018-10-30 RX ADMIN — KETOROLAC TROMETHAMINE 30 MG: 30 INJECTION, SOLUTION INTRAMUSCULAR at 06:05

## 2018-10-30 RX ADMIN — SODIUM CHLORIDE 150 ML/HR: 0.9 INJECTION, SOLUTION INTRAVENOUS at 02:52

## 2018-10-30 RX ADMIN — BACITRACIN ZINC, NEOMYCIN SULFATE, AND POLYMYXIN B SULFATE 1 SMALL APPLICATION: 400; 3.5; 5 OINTMENT TOPICAL at 10:08

## 2018-10-30 RX ADMIN — KETOROLAC TROMETHAMINE 30 MG: 30 INJECTION, SOLUTION INTRAMUSCULAR at 11:50

## 2018-10-30 RX ADMIN — FAMOTIDINE 20 MG: 20 TABLET ORAL at 06:05

## 2018-10-30 RX ADMIN — METOCLOPRAMIDE 10 MG: 5 INJECTION, SOLUTION INTRAMUSCULAR; INTRAVENOUS at 00:33

## 2018-10-30 RX ADMIN — OXYCODONE HYDROCHLORIDE AND ACETAMINOPHEN 1 TABLET: 5; 325 TABLET ORAL at 05:42

## 2018-10-30 RX ADMIN — HEPARIN SODIUM 5000 UNITS: 5000 INJECTION INTRAVENOUS; SUBCUTANEOUS at 15:01

## 2018-10-30 RX ADMIN — CIPROFLOXACIN HYDROCHLORIDE 250 MG: 250 TABLET, FILM COATED ORAL at 10:10

## 2018-10-30 RX ADMIN — OXYCODONE HYDROCHLORIDE AND ACETAMINOPHEN 1 TABLET: 5; 325 TABLET ORAL at 10:07

## 2018-10-30 RX ADMIN — KETOROLAC TROMETHAMINE 30 MG: 30 INJECTION, SOLUTION INTRAMUSCULAR at 00:33

## 2018-10-30 RX ADMIN — DOCUSATE SODIUM 100 MG: 100 CAPSULE, LIQUID FILLED ORAL at 10:07

## 2018-10-30 RX ADMIN — CEFAZOLIN SODIUM 1000 MG: 1 SOLUTION INTRAVENOUS at 00:33

## 2018-10-30 NOTE — DISCHARGE SUMMARY
DISCHARGE SUMMARY    Patient Name: Barrington Vernon  Patient MRN: [id number]  Admitting Provider: Jet Ornelas MD  Discharging Provider: Jet Ornelas MD  Primary Care Physician at Discharge: Arian Coelho [place] [phone]   Admission Date: 10/29/2018       Discharge Date: 10/30/18      Admission Diagnosis   Malignant neoplasm of prostate [facility] [place] [facility]    Discharge Diagnoses  Patient Active Problem List   Diagnosis    Arthritis of knee, right    Erectile dysfunction of non-organic origin    Hyperlipidemia    IBS (irritable bowel syndrome)    Nonvenomous insect bite of thorax    Vitamin D deficiency    Elevated PSA    BPH with obstruction/lower urinary tract symptoms    Malignant neoplasm prostate ([address] )    Malignant neoplasm of prostate [facility])         Hospital Course  Patient known to group for office evaluation regarding elevated PSA  Patient underwent prostate biopsy which was demonstrative for [place] score 6 Adenocarcinoma of the Prostate  After explanation of Risks vs  Benefits and potential complications; patient was agreeable and furnished informed consent for radical prostatectomy and bilateral lymph node dissection  Refer to operative report for details  There were no complications      Medications  Current Discharge Medication List      CONTINUE these medications which have NOT CHANGED    Details   CIALIS 20 MG tablet TAKE 1/2 TO 1 TABLET 1 HOURBEFORE ACTIVITY AS DIRECTED  Qty: 18 tablet, Refills: 2    Associated Diagnoses: Erectile dysfunction, unspecified erectile dysfunction type      ezetimibe-simvastatin (VYTORIN) 10-10 mg per tablet TAKE 1 TABLET DAILY  Qty: 90 tablet, Refills: 0    Associated Diagnoses: Mixed hyperlipidemia           Current Discharge Medication List      START taking these medications    Details   ciprofloxacin (CIPRO) 250 mg tablet Take 2 tablets (500 mg total) by mouth every 12 (twelve) hours for 5 days  Qty: 20 tablet, Refills: 0    Associated Diagnoses: Malignant neoplasm of prostate (HCC)      docusate sodium (COLACE) 100 mg capsule Take 1 capsule (100 mg total) by mouth 2 (two) times a day for 10 days  Qty: 20 capsule, Refills: 0    Associated Diagnoses: Malignant neoplasm of prostate (HCC)      famotidine (PEPCID) 20 mg tablet Take 1 tablet (20 mg total) by mouth daily for 10 days  Qty: 10 tablet, Refills: 0    Associated Diagnoses: Malignant neoplasm of prostate (HCC)      oxybutynin (DITROPAN) 5 mg tablet Take 1 tablet (5 mg total) by mouth 3 (three) times a day for 10 days  Qty: 30 tablet, Refills: 0    Associated Diagnoses: Malignant neoplasm of prostate (HCC)      oxyCODONE (ROXICODONE) 5 mg immediate release tablet Take 1 tablet (5 mg total) by mouth every 6 (six) hours as needed for moderate pain for up to 10 days Max Daily Amount: 20 mg  Qty: 40 tablet, Refills: 0    Associated Diagnoses: Malignant neoplasm of prostate ([facility] )               Allergies  Allergies   Allergen Reactions    Lactose GI Intolerance       Outpatient Follow-Up  Future Appointments  Date Time Provider Jacque Gallo   11/12/2018 3:30 PM DO MELANI [facility]   6/13/2019 10:15 AM Agustin Bray MD [facility]       Active Issues Requiring Follow-Up  Catheter and drain removal    Test Results Pending at Discharge   Order Current Status    Tissue Exam In process            Discharge Disposition  Home with health care services    Treatments   Parra catheter & ATIF drain    Consults   None    Procedures   percutaneous drainage catheter placement    Operative Procedures Performed  Procedure(s):  PROSTATECTOMY RADICAL W ROBOTICS; BILATERAL PELVIC LYMPH NODE DISSECTION; LYSIS OF ADHESIONS    Pertinent Test Results   Pathology pending  Lab Results   Component Value Date    WBC 8 97 10/30/2018    HGB 12 7 10/30/2018    HCT 37 6 10/30/2018    MCV 95 10/30/2018     10/30/2018     Lab Results   Component Value Date    CALCIUM 8 0 (L) 10/30/2018     10/30/2018 K 3 6 10/30/2018    CO2 25 10/30/2018     10/30/2018    BUN 11 10/30/2018    CREATININE 1 03 10/30/2018       Physical Exam at Discharge  Condition of Patient on Discharge: good  General appearance: alert and oriented, in no acute distress  Head: Normocephalic, without obvious abnormality, atraumatic  Neck: no adenopathy, no carotid bruit, no JVD, supple, symmetrical, trachea midline and thyroid not enlarged, symmetric, no tenderness/mass/nodules  Lungs: clear to auscultation bilaterally  Heart: regular rate and rhythm, S1, S2 normal, no murmur, click, rub or gallop  Abdomen: abnormal findings:  mild and Incisional pain; to be expected  tenderness in the lower abdomen  Extremities: extremities normal, warm and well-perfused; no cyanosis, clubbing, or edema  Pulses: 2+ and symmetric  Neurologic: Grossly normal  Parra catheter patent for clear yellow urine  ATIF drain to bulb suction--patent for serosanguineous  Abdominal punctures clean dry and intact; Dermabond well-approximated    Total time spent with patient 20 minutes, >50% spent counseling and/or coordination of care         GRACE Fairchild

## 2018-10-30 NOTE — UTILIZATION REVIEW
Thank you,  [address]  Utilization Review Department  Phone: [phone]; Fax [phone]  ATTENTION: Please call with any questions or concerns to [phone]  and carefully follow the prompts so that you are directed to the right person  Send all requests for admission clinical reviews, approved or denied determinations and any other requests to fax [phone]  All voicemails are confidential      ================================================================    Initial Clinical Review    Age/Sex: 66 y o  male    Surgery Date: 10/29/2018    Procedure: PROSTATECTOMY RADICAL W ROBOTICS; BILATERAL PELVIC LYMPH NODE DISSECTION; LYSIS OF ADHESIONS 30 minutes  Anesthesia: General    Admission Orders: Date/Time/Statement: 10/29/18 @ 1138   Orders Placed This Encounter   Procedures    Inpatient Admission     Standing Status:   Standing     Number of Occurrences:   1     Order Specific Question:   Admitting Physician     Answer:   Manuel Puente [[id number]]     Order Specific Question:   Level of Care     Answer:   Med Surg [[id number]]     Order Specific Question:   Bed request comments     Answer:   2 East unit please     Order Specific Question:   Estimated length of stay     Answer:   Inpatient Only Surgery       Vital Signs: /77 (BP Location: Left arm)   Pulse 70   Temp 98 2 °F (36 8 °C) (Tympanic)   Resp 18   Ht 6' 2" (1 88 m)   Wt 91 6 kg (201 lb 15 1 oz)   SpO2 96%   BMI 25 93 kg/m²     Diet:        Diet Orders            Start     Ordered    10/30/18 0500  Diet Regular; Regular House  Diet effective 0500     Question Answer Comment   Diet Type Regular    Regular Regular House    RD to adjust diet per protocol?  No        10/29/18 2236        Mobility: Up with assistance, DOS - Up in chair, Ambulate q4h    DVT Prophylaxis: Chris SCDs    Pain Control:   Pain Medications             oxyCODONE (ROXICODONE) 5 mg immediate release tablet Take 1 tablet (5 mg total) by mouth every 6 (six) hours as needed for moderate pain for up to 10 days Max Daily Amount: 20 mg        Scheduled Meds:  Current Facility-Administered Medications:  acetaminophen 650 mg Oral Q6H PRN   ciprofloxacin 250 mg Oral Q12H CHRISTINA   docusate sodium 100 mg Oral BID   famotidine 20 mg Oral BID AC   heparin (porcine) 5,000 Units Subcutaneous Q8H [address]   ketorolac 30 mg Intravenous Q6H CHRISTINA   metoclopramide 10 mg Intravenous Q6H [address]   neomycin-bacitracin-polymyxin b 1 small application Topical BID   ondansetron 4 mg Intravenous Q6H PRN   oxybutynin 5 mg Oral 4x Daily PRN   simethicone 80 mg Oral Q6H PRN

## 2018-10-30 NOTE — PROGRESS NOTES
Provided patient with CATHRYN instructions and used teach back method  Provided patient with all abel teaching and used teach back method  Sent patient home with a leg bag, a night bag, alcohol swabs, a cylinder, a cathryn measurement cup, two stat locks, and all written instructions and prescriptions  Patient and wife both verbalized understanding of all instructions  Took out patient IV and took him out to car by wheelchair with his belongings

## 2018-10-31 ENCOUNTER — TELEPHONE (OUTPATIENT)
Dept: UROLOGY | Facility: MEDICAL CENTER | Age: 63
End: 2018-10-31

## 2018-10-31 NOTE — TELEPHONE ENCOUNTER
Spoke with pt and answered his and his wife's numerous questions  His appetite is returning and he is passing gas  No bowel movement as of now  Taking simethicone and stool softeners  Urine clear mina  J/P drain output about 300 cc last 7 hours  Asking if ok to take 2 Percocet at a time  Told him ok with a warning about constipation  He's asking if ok to take NSAIDS with Percocet for breakthrough pain  Asking Dr Afshan Obrien for his opinion

## 2018-10-31 NOTE — TELEPHONE ENCOUNTER
Spoke with pt  Has questions after Prostatectomy  Will refer to Continence Nurse who handles post op teaching  Notified her of pt's concerns

## 2018-11-01 ENCOUNTER — TRANSITIONAL CARE MANAGEMENT (OUTPATIENT)
Dept: FAMILY MEDICINE CLINIC | Facility: CLINIC | Age: 63
End: 2018-11-01

## 2018-11-01 NOTE — UTILIZATION REVIEW
REF# [id number] - SENDING D/C NOTIFICATION - HAVE NOT HEARD FROM ANY ONE WITH DETERMINATION CAN SOMEONE CALL ME   [facility]

## 2018-11-05 ENCOUNTER — TELEPHONE (OUTPATIENT)
Dept: UROLOGY | Facility: AMBULATORY SURGERY CENTER | Age: 63
End: 2018-11-05

## 2018-11-05 ENCOUNTER — CLINICAL SUPPORT (OUTPATIENT)
Dept: UROLOGY | Facility: MEDICAL CENTER | Age: 63
End: 2018-11-05

## 2018-11-05 VITALS
WEIGHT: 194 LBS | BODY MASS INDEX: 24.9 KG/M2 | TEMPERATURE: 99.5 F | HEIGHT: 74 IN | SYSTOLIC BLOOD PRESSURE: 128 MMHG | DIASTOLIC BLOOD PRESSURE: 80 MMHG

## 2018-11-05 DIAGNOSIS — C61 PROSTATE CANCER (HCC): Primary | ICD-10-CM

## 2018-11-05 PROCEDURE — 99024 POSTOP FOLLOW-UP VISIT: CPT

## 2018-11-05 NOTE — TELEPHONE ENCOUNTER
Spoke with pt who said his J/P drain output was about 125-140cc last 24 hours  Appt given for this am in office to remove it  Pt states he has a severe neck strain and wants to know if he can take a muscle relaxer that he has at home  He will bring it with him to office so doctor can see what it is and if he can take it

## 2018-11-05 NOTE — TELEPHONE ENCOUNTER
Spoke with patient, patient said he sprained his neck and wants to know if he can take something  He also said he was supposed to come in today to remove drain but never got a confirmation time? Please call patient back

## 2018-11-05 NOTE — PROGRESS NOTES
I have reviewed the notes, assessments, and/or procedures performed , I concur with her/his documentation of Ravindra Campbell

## 2018-11-05 NOTE — PROGRESS NOTES
Pt in office for j/p drain removal  J/P drain output 125-140cc last 24 hours  J/P drain removed without difficulty and DSD applied to site  Other incision edges well-approximated without redness, edema, exudate, or warmth  Pt states bowels are working well  BS + all 4 quadrants  Pt c/o severe neck strain  As per Dr Magalie Baltazar, told him he can take the muscle relaxers he has at home with his Percocet  He has been taking Oxybutynin TID as it was ordered, so told him he doesn't need to take them unless he has bladder spasms  F/U in office for catheter removal in one week on schedule

## 2018-11-06 ENCOUNTER — OFFICE VISIT (OUTPATIENT)
Dept: FAMILY MEDICINE CLINIC | Facility: CLINIC | Age: 63
End: 2018-11-06
Payer: COMMERCIAL

## 2018-11-06 VITALS
DIASTOLIC BLOOD PRESSURE: 80 MMHG | RESPIRATION RATE: 16 BRPM | HEART RATE: 102 BPM | HEIGHT: 71 IN | TEMPERATURE: 99.4 F | SYSTOLIC BLOOD PRESSURE: 120 MMHG | WEIGHT: 196 LBS | BODY MASS INDEX: 27.44 KG/M2 | OXYGEN SATURATION: 95 %

## 2018-11-06 DIAGNOSIS — M79.10 MUSCULAR PAIN: ICD-10-CM

## 2018-11-06 DIAGNOSIS — M62.838 MUSCLE SPASM: Primary | ICD-10-CM

## 2018-11-06 PROCEDURE — 99213 OFFICE O/P EST LOW 20 MIN: CPT | Performed by: NURSE PRACTITIONER

## 2018-11-06 PROCEDURE — 96372 THER/PROPH/DIAG INJ SC/IM: CPT | Performed by: NURSE PRACTITIONER

## 2018-11-06 RX ORDER — METHOCARBAMOL 750 MG/1
750 TABLET, FILM COATED ORAL EVERY 6 HOURS PRN
Qty: 30 TABLET | Refills: 0 | Status: SHIPPED | OUTPATIENT
Start: 2018-11-06 | End: 2019-11-08 | Stop reason: ALTCHOICE

## 2018-11-06 RX ORDER — KETOROLAC TROMETHAMINE 30 MG/ML
60 INJECTION, SOLUTION INTRAMUSCULAR; INTRAVENOUS ONCE
Status: COMPLETED | OUTPATIENT
Start: 2018-11-06 | End: 2018-11-06

## 2018-11-06 RX ADMIN — KETOROLAC TROMETHAMINE 60 MG: 30 INJECTION, SOLUTION INTRAMUSCULAR; INTRAVENOUS at 14:42

## 2018-11-06 NOTE — PROGRESS NOTES
Assessment/Plan:    1  Muscular pain  67 yo male presents with left neck/trapezius pain and spasm, waking up with it Sunday morning  He has significantly decreased ROM as detailed below  He is in notable discomfort  I will give him a one time dose IM Toradol in the office today and PRN Robaxin  He was advised of precautions while taking a muscle relaxant  I advised he could continue using the heating pad for muscle relaxation/pain relief  We discussed the possibility of of a PT eval if his symptoms do not resolve  He is to return to the office if his symptoms persist or worsen in the next few days  - ketorolac (TORADOL) 60 mg/2 mL IM injection 60 mg; Inject 2 mL (60 mg total) into a muscle once   - methocarbamol (ROBAXIN) 750 mg tablet; Take 1 tablet (750 mg total) by mouth every 6 (six) hours as needed for muscle spasms  Dispense: 30 tablet; Refill: 0    2  Muscle spasm    - methocarbamol (ROBAXIN) 750 mg tablet; Take 1 tablet (750 mg total) by mouth every 6 (six) hours as needed for muscle spasms  Dispense: 30 tablet; Refill: 0      Subjective:      Patient ID: Tremaine Shown is a 66 y o  male  HPI     Chief Complaint   Patient presents with    Neck Pain     x 3 days     Patient presents today with "severe" neck pain x3 days  He assumes that he twisted wrong while sleeping Saturday night as he woke Sunday unable to freely move it  It has become more stiff between yesterday and today  He took a total of 3 Flexeril pills that he had from a previous injury (several years ago) and states they didn't help  Patient is s/p radical prostatectomy on 10/29  He has had no post op surgical complications  The surgical drain was removed yesterday but he still has the abel catheter in place  His urine is clear, no hematuria  He has Percocet prescribed from the surgery and states those have not helped either  Only other intervention trialed was a heating pad which he states is helpful       The following portions of the patient's history were reviewed and updated as appropriate: allergies, current medications, past medical history and past surgical history  Review of Systems   Constitutional: Positive for activity change (s/p surgery 10/29)  Negative for appetite change and fatigue  HENT: Negative  Respiratory: Negative  Cardiovascular: Negative  Musculoskeletal: Positive for neck pain (pain at base of skull and radiates down left neck/trapezius  ) and neck stiffness  Neurological: Positive for headaches  Negative for dizziness, light-headedness and numbness  Objective:      /80 (BP Location: Left arm, Patient Position: Sitting, Cuff Size: Adult)   Pulse 102   Temp 99 4 °F (37 4 °C) (Tympanic)   Resp 16   Ht 5' 10 87" (1 8 m)   Wt 88 9 kg (196 lb)   SpO2 95%   BMI 27 44 kg/m²          Physical Exam   Constitutional: He is oriented to person, place, and time  Pulmonary/Chest: Effort normal    Musculoskeletal:        Right shoulder: Normal         Left shoulder: He exhibits decreased range of motion, pain and spasm  He exhibits no crepitus  Cervical back: He exhibits decreased range of motion ( limited flexion, extension and rotation of head secondary to pain)  denies numbness/tingling in left arm/fingers   Neurological: He is alert and oriented to person, place, and time

## 2018-11-07 NOTE — TELEPHONE ENCOUNTER
Patient will try the Aleve and hopefully that will help,if it does not ,he is to let his PCP know that his neck is no better

## 2018-11-07 NOTE — TELEPHONE ENCOUNTER
Patient saw his PCP yesterday for his neck pain he has had for 3 days that he woke up with  His doctor gave him a Toradol injection which did help but the pain is back ,a 8 - 10 on a scale of 10  He has taken Percocet and is on a muscle relaxant also  But he thinks Ibuprofen might help the pain  He is a week po now  May he take the Ibuprofen? Or alternate Ibuprofen  with Tylenol?

## 2018-11-09 ENCOUNTER — TELEPHONE (OUTPATIENT)
Dept: UROLOGY | Facility: AMBULATORY SURGERY CENTER | Age: 63
End: 2018-11-09

## 2018-11-09 NOTE — TELEPHONE ENCOUNTER
Patient did strain a little bit last night having a bowel movement,but he had pink urine earlier and now the urine seems to be clear  He was told to observe the color of the urine and increase his fluids  He is taking stool softeners daily  He was told to call if worse or any problem

## 2018-11-12 ENCOUNTER — CLINICAL SUPPORT (OUTPATIENT)
Dept: UROLOGY | Facility: MEDICAL CENTER | Age: 63
End: 2018-11-12

## 2018-11-12 ENCOUNTER — OFFICE VISIT (OUTPATIENT)
Dept: FAMILY MEDICINE CLINIC | Facility: CLINIC | Age: 63
End: 2018-11-12
Payer: COMMERCIAL

## 2018-11-12 ENCOUNTER — TELEPHONE (OUTPATIENT)
Dept: UROLOGY | Facility: MEDICAL CENTER | Age: 63
End: 2018-11-12

## 2018-11-12 VITALS
BODY MASS INDEX: 24.77 KG/M2 | DIASTOLIC BLOOD PRESSURE: 84 MMHG | SYSTOLIC BLOOD PRESSURE: 140 MMHG | HEIGHT: 74 IN | WEIGHT: 193 LBS

## 2018-11-12 VITALS
OXYGEN SATURATION: 95 % | BODY MASS INDEX: 25 KG/M2 | RESPIRATION RATE: 15 BRPM | HEIGHT: 74 IN | DIASTOLIC BLOOD PRESSURE: 76 MMHG | TEMPERATURE: 98.4 F | SYSTOLIC BLOOD PRESSURE: 128 MMHG | HEART RATE: 97 BPM | WEIGHT: 194.8 LBS

## 2018-11-12 DIAGNOSIS — Z90.79 S/P PROSTATECTOMY: ICD-10-CM

## 2018-11-12 DIAGNOSIS — C61 PROSTATE CANCER (HCC): Primary | ICD-10-CM

## 2018-11-12 DIAGNOSIS — M54.2 NECK PAIN: Primary | ICD-10-CM

## 2018-11-12 PROCEDURE — 99213 OFFICE O/P EST LOW 20 MIN: CPT | Performed by: FAMILY MEDICINE

## 2018-11-12 PROCEDURE — 99024 POSTOP FOLLOW-UP VISIT: CPT

## 2018-11-12 NOTE — PROGRESS NOTES
I have reviewed the notes, assessments, and/or procedures performed, I concur with her/his documentation of Kim Whittington

## 2018-11-12 NOTE — TELEPHONE ENCOUNTER
Patient coming in for post op visit with Dr Celestine Knight 11/28/18 and needs to schedule Biofeedback with you  Patient can be reached at [phone]

## 2018-11-13 NOTE — TELEPHONE ENCOUNTER
I thought I briefly discussed the patient is pathology with him when he was in for his drain removal

## 2018-11-13 NOTE — PROGRESS NOTES
Assessment/Plan:         Diagnoses and all orders for this visit:    Neck pain        Neck pain much improved though not 100%  I recommended that he continue with ibuprofen, moist heat and gentle stretches  Handout was stretching exercises were pros provided for him today      Subjective:      Patient ID: Millie Sabillon is a 66 y o  male  Patient presents to follow-up his recent visit  He was here on 11/06 for acute neck pain  He relates that he had prostate surgery several weeks ago and since that time because of an indwelling Parra catheter was forced to lie sleeping in a position that was not natural for him  He then several days prior to presentation awoke with acute pain with severely reduced range of motion  He was seen in the office on 11/06  He was treated with muscle relaxants and given a single injection of Toradol which did give him significant but temporary relief from his pain  He was prohibited at the time from taking NSAIDs because of his recent surgery but since then has been released by his urologist to take over-the-counter ibuprofen  He has been taking that for the last several days and he notes that his pain level has improved significantly  He feels that the muscle relaxants that he was given prior did not help much  The following portions of the patient's history were reviewed and updated as appropriate: allergies, current medications, past family history, past medical history, past social history, past surgical history and problem list     Review of Systems   Musculoskeletal: Positive for neck pain and neck stiffness  Objective:      /76 (BP Location: Right arm, Patient Position: Sitting, Cuff Size: Adult)   Pulse 97   Temp 98 4 °F (36 9 °C) (Tympanic)   Resp 15   Ht 6' 2" (1 88 m)   Wt 88 4 kg (194 lb 12 8 oz)   SpO2 95%   BMI 25 01 kg/m²          Physical Exam   Musculoskeletal:   Mildly reduced cervical range of motion    Negative focal neurologic signs

## 2018-11-14 ENCOUNTER — TELEPHONE (OUTPATIENT)
Dept: UROLOGY | Facility: MEDICAL CENTER | Age: 63
End: 2018-11-14

## 2018-11-14 NOTE — TELEPHONE ENCOUNTER
Nurse from [facility] called to advise doctor that she discharged this patient from homecare today

## 2018-11-21 LAB
ALBUMIN SERPL-MCNC: 4.3 G/DL (ref 3.6–5.1)
ALBUMIN/GLOB SERPL: 1.6 (CALC) (ref 1–2.5)
ALP SERPL-CCNC: 89 U/L (ref 40–115)
ALT SERPL-CCNC: 33 U/L (ref 9–46)
AST SERPL-CCNC: 21 U/L (ref 10–35)
BILIRUB SERPL-MCNC: 0.8 MG/DL (ref 0.2–1.2)
BUN SERPL-MCNC: 19 MG/DL (ref 7–25)
BUN/CREAT SERPL: NORMAL (CALC) (ref 6–22)
CALCIUM SERPL-MCNC: 9.5 MG/DL (ref 8.6–10.3)
CHLORIDE SERPL-SCNC: 99 MMOL/L (ref 98–110)
CHOLEST SERPL-MCNC: 199 MG/DL
CHOLEST/HDLC SERPL: 3.2 (CALC)
CO2 SERPL-SCNC: 29 MMOL/L (ref 20–32)
CREAT SERPL-MCNC: 1.03 MG/DL (ref 0.7–1.25)
GLOBULIN SER CALC-MCNC: 2.7 G/DL (CALC) (ref 1.9–3.7)
GLUCOSE SERPL-MCNC: 92 MG/DL (ref 65–99)
HDLC SERPL-MCNC: 62 MG/DL
LDLC SERPL CALC-MCNC: 112 MG/DL (CALC)
NONHDLC SERPL-MCNC: 137 MG/DL (CALC)
POTASSIUM SERPL-SCNC: 4.5 MMOL/L (ref 3.5–5.3)
PROT SERPL-MCNC: 7 G/DL (ref 6.1–8.1)
SL AMB EGFR AFRICAN AMERICAN: 90 ML/MIN/1.73M2
SL AMB EGFR NON AFRICAN AMERICAN: 78 ML/MIN/1.73M2
SODIUM SERPL-SCNC: 137 MMOL/L (ref 135–146)
TRIGL SERPL-MCNC: 132 MG/DL
TSH SERPL-ACNC: 1.48 MIU/L (ref 0.4–4.5)

## 2018-11-28 ENCOUNTER — TELEPHONE (OUTPATIENT)
Dept: UROLOGY | Facility: MEDICAL CENTER | Age: 63
End: 2018-11-28

## 2018-11-28 ENCOUNTER — OFFICE VISIT (OUTPATIENT)
Dept: UROLOGY | Facility: MEDICAL CENTER | Age: 63
End: 2018-11-28
Payer: COMMERCIAL

## 2018-11-28 VITALS
BODY MASS INDEX: 24 KG/M2 | WEIGHT: 187 LBS | DIASTOLIC BLOOD PRESSURE: 88 MMHG | HEART RATE: 85 BPM | HEIGHT: 74 IN | SYSTOLIC BLOOD PRESSURE: 144 MMHG

## 2018-11-28 DIAGNOSIS — N39.3 STRESS INCONTINENCE, MALE: ICD-10-CM

## 2018-11-28 DIAGNOSIS — C61 MALIGNANT NEOPLASM OF PROSTATE (HCC): Primary | ICD-10-CM

## 2018-11-28 DIAGNOSIS — Z90.79 HISTORY OF RADICAL PROSTATECTOMY: ICD-10-CM

## 2018-11-28 LAB
SL AMB  POCT GLUCOSE, UA: ABNORMAL
SL AMB LEUKOCYTE ESTERASE,UA: ABNORMAL
SL AMB POCT BILIRUBIN,UA: ABNORMAL
SL AMB POCT BLOOD,UA: ABNORMAL
SL AMB POCT CLARITY,UA: ABNORMAL
SL AMB POCT COLOR,UA: ABNORMAL
SL AMB POCT KETONES,UA: ABNORMAL
SL AMB POCT NITRITE,UA: ABNORMAL
SL AMB POCT PH,UA: 5.5
SL AMB POCT SPECIFIC GRAVITY,UA: 1.02
SL AMB POCT URINE PROTEIN: ABNORMAL
SL AMB POCT UROBILINOGEN: 0.2

## 2018-11-28 PROCEDURE — 99024 POSTOP FOLLOW-UP VISIT: CPT | Performed by: UROLOGY

## 2018-11-28 PROCEDURE — 81003 URINALYSIS AUTO W/O SCOPE: CPT | Performed by: UROLOGY

## 2018-11-28 RX ORDER — ASPIRIN 81 MG/1
81 TABLET ORAL DAILY
COMMUNITY

## 2018-11-28 NOTE — TELEPHONE ENCOUNTER
Pt was seen by Dr Dre Dobson this morning and needs to schedule 1 more session of Biofeedback with you  Patient can be reached at [phone]

## 2018-11-28 NOTE — PROGRESS NOTES
Assessment/Plan:      Diagnoses and all orders for this visit:    Malignant neoplasm of prostate ([facility] [address] )  -     PSA Total, Diagnostic; Future    History of radical prostatectomy  -     POCT urine dip auto non-scope    Stress incontinence, male    Other orders  -     aspirin (ECOTRIN LOW STRENGTH) 81 mg EC tablet; Take 81 mg by mouth daily          Subjective:  Mild stress incontinence     Patient ID: Jan Montoya is a 66 y o  male  HPI  He is 1 month after his laparoscopic robotic radical prostatectomy for pT2c pN0 prostate cancer  He has had a steady, reliable and uneventful postoperative recovery  He states he is voiding reasonably well with a good urine control and a good force of stream   He does have some mild obligatory stress urinary incontinence of a few drops of urine, when he moves from a sitting to standing position  He does report that he is dry at night while laying down also while standing and walking  He denies any abdominal or flank pains, dysuria, or hematuria  Review of Systems   Constitutional: Negative  HENT: Negative  Eyes: Negative  Respiratory: Negative  Cardiovascular: Negative  Gastrointestinal: Negative  Endocrine: Negative  Genitourinary: Negative  Musculoskeletal: Negative  Skin: Negative  Allergic/Immunologic: Negative  Neurological: Negative  Hematological: Negative  Psychiatric/Behavioral: Negative  Objective:     Physical Exam   Constitutional: He is oriented to person, place, and time  He appears well-developed and well-nourished  No distress  HENT:   Head: Normocephalic and atraumatic  Nose: Nose normal    Mouth/Throat: Oropharynx is clear and moist    Eyes: Pupils are equal, round, and reactive to light  Conjunctivae and EOM are normal  No scleral icterus  Neck: Normal range of motion  Neck supple  Cardiovascular: Normal rate, regular rhythm, normal heart sounds and intact distal pulses      No murmur heard   Pulmonary/Chest: Effort normal and breath sounds normal  No respiratory distress  He has no wheezes  He has no rales  Abdominal: Soft  Bowel sounds are normal  He exhibits no distension and no mass  There is no tenderness  Musculoskeletal: Normal range of motion  He exhibits no edema or tenderness  Lymphadenopathy:     He has no cervical adenopathy  Neurological: He is alert and oriented to person, place, and time  No cranial nerve deficit  Skin: Skin is warm and dry  No rash noted  No erythema  No pallor  Psychiatric: He has a normal mood and affect  His behavior is normal  Judgment and thought content normal    Nursing note and vitals reviewed  10/29/2018 1154                                      [facility]                                                      Pathologist:           Barb Hidalgo MD                                                               Specimens:   A) - Prostate                                                                                        B) - Lymph Node, BILATERAL PELVIC LYMPH NODES                                              Final Diagnosis   A  Prostate ( 47 gm)  prostatectomy    -Prostatic adenocarcinoma involving both lobes, Abbey grade 3+3 = 6 and small tertiary pattern of grade 4 ( <5%)   -Foci of High-grade intraepithelial neoplasia (PIN)  Present  -Perineural invasion  Present     B  Lymph Node, bilateral pelvic lymph nodes:  -Five lymph nodes negative for metastatic carcinoma (0/5)     Tumor staging protocol:  1  Specimen identification:       - Procedure:  Radical prostatectomy with bilateral pelvic lymph nodes dissection     - Prostate size and weight: the prostate measures 4 5 x 4 0 x 2 8 cm and weight 47 gm     - Lymph node sampling: Five lymph nodes submitted ( specimen B)   2   Tumor     - Histologic type:  Prostatic adenocarcinoma     - Histologic Grade: Abbey Pattern:       * primary pattern:          3     * secondary pattern:     3     * tertiary pattern:          4  ( <5%)         * total Emmetsburg Score:  3 + 3 = 6     * Grade Group:             Group 1     * Percentage of pattern 4: <5%     * Percentage of pattern 3:  95%     * Intraductal carcinoma: not identified    - Tumor quantitation:  Tumor  involves both lobes                                        Tumor  involves approximately 20% of left lobe                                        Tumor  involves approximately 5% of right lobe      - Extraprostatic extension (focal or nonfocal and list all sites that apply) : not identified    - Urinary bladder neck invasion:  not identified    - Seminal vesicle invasion : not identified   5  Margins :     - Right Apical:              Negative for tumor     - Right Bladder neck:  Negative for tumor     - Right Anterior:          Negative for tumor     - Right Lateral:           Negative for tumor     - Right Postero-lateral (neurovascular bundle):  Negative for tumor     - Right Posterior:       Negative for tumor     - Left Apical:              Negative for tumor     - Left Bladder neck:   Negative for tumor     - Left Anterior:           Negative for tumor     - Left Lateral:            Negative for tumor     - Left Postero-lateral (neurovascular bundle): Negative for tumor     - Left Posterior:         Negative for tumor   6  Margin positivity in area of extraprostatic extension : N/A   7  Treatment effect on carcinoma:  N/A   8  Lymph-vascular invasion:           not identified     9  Perineural invasion:                   present    10  Regional lymph nodes (pN0):        -  Number of lymph nodes involved (specify site): 0     -  Number of lymph nodes examined: 5     -  Size of largest metastatic deposit in lymph node (millimeters) and site: N/A     -  Extranodal extension:   N/A  11  Additional pathologic findings;   Multiple foci of high-grade intraepithelial neoplasia (PIN) seen    12   Ancillary studies:  none used in evaluation  13  PSA:      5 4 (ng/mL)  14   Best representative block if additional studies are needed:  A15  15  8th Ed AJCC Tumor Stage:  at least Stage  IIA ( pT2c, pN0, Orgas's score  3+3=6 and small tertiary pattern of grade 4 ( <5%)

## 2018-12-03 ENCOUNTER — OFFICE VISIT (OUTPATIENT)
Dept: FAMILY MEDICINE CLINIC | Facility: CLINIC | Age: 63
End: 2018-12-03
Payer: COMMERCIAL

## 2018-12-03 VITALS
HEIGHT: 74 IN | TEMPERATURE: 99 F | DIASTOLIC BLOOD PRESSURE: 90 MMHG | RESPIRATION RATE: 17 BRPM | OXYGEN SATURATION: 99 % | HEART RATE: 74 BPM | SYSTOLIC BLOOD PRESSURE: 144 MMHG | BODY MASS INDEX: 25.17 KG/M2 | WEIGHT: 196.1 LBS

## 2018-12-03 DIAGNOSIS — E78.2 MIXED HYPERLIPIDEMIA: ICD-10-CM

## 2018-12-03 PROCEDURE — 3008F BODY MASS INDEX DOCD: CPT | Performed by: FAMILY MEDICINE

## 2018-12-03 PROCEDURE — 99214 OFFICE O/P EST MOD 30 MIN: CPT | Performed by: FAMILY MEDICINE

## 2018-12-03 RX ORDER — EZETIMIBE AND SIMVASTATIN 10; 10 MG/1; MG/1
1 TABLET ORAL DAILY
Qty: 90 TABLET | Refills: 1 | Status: SHIPPED | OUTPATIENT
Start: 2018-12-03 | End: 2019-08-05 | Stop reason: SDUPTHER

## 2018-12-03 NOTE — PROGRESS NOTES
Assessment/Plan:    Hyperlipidemia   total cholesterol 199 with an LDL of 112  Continue with Vytorin     prostate cancer   recovering well status post robotic prostatectomy  Continue usual follow-up with Urology  Neck pain   approximately 90% recovered  Can take muscle relaxant or NSAID as needed  Continue home exercises  Recheck in 6 months with labs prior to next visit  Diagnoses and all orders for this visit:    Mixed hyperlipidemia  -     ezetimibe-simvastatin (VYTORIN) 10-10 mg per tablet; Take 1 tablet by mouth daily  -     Comprehensive metabolic panel; Future  -     Lipid Panel with Direct LDL reflex; Future  -     TSH, 3rd generation; Future  -     Comprehensive metabolic panel  -     Lipid Panel with Direct LDL reflex  -     TSH, 3rd generation          Subjective:      Patient ID: Yesenia Condon is a 66 y o  male  Routine follow-up of chronic medical problems  Patient is being treated for hyperlipidemia  He has also recently undergone robotic prostatectomy for prostate cancer  He is recovering well from his surgery  His neck pain for which she was seen recently is also continuing to improve  He takes Vytorin for his hyperlipidemia and he  Is on no other chronic regular medications  The following portions of the patient's history were reviewed and updated as appropriate: allergies, current medications, past family history, past medical history, past social history, past surgical history and problem list     Review of Systems   Constitutional: Negative  Respiratory: Negative  Cardiovascular: Negative  Gastrointestinal: Negative  Genitourinary: Positive for enuresis  Musculoskeletal: Negative  Psychiatric/Behavioral: Negative            Objective:      /90 (BP Location: Left arm, Patient Position: Sitting, Cuff Size: Large)   Pulse 74   Temp 99 °F (37 2 °C) (Tympanic)   Resp 17   Ht 6' 2" (1 88 m)   Wt 89 kg (196 lb 1 6 oz)   SpO2 99%   BMI 25 18 kg/m²          Physical Exam   Constitutional: He is oriented to person, place, and time  He appears well-developed and well-nourished  No distress  HENT:   Head: Normocephalic and atraumatic  Eyes: Pupils are equal, round, and reactive to light  Conjunctivae are normal  Right eye exhibits no discharge  Neck: Normal range of motion  No thyromegaly present  Cardiovascular: Normal rate and regular rhythm  Pulmonary/Chest: Effort normal and breath sounds normal  No respiratory distress  Lymphadenopathy:     He has no cervical adenopathy  Neurological: He is alert and oriented to person, place, and time  Skin: Skin is warm and dry  He is not diaphoretic  Psychiatric: He has a normal mood and affect  His behavior is normal  Judgment and thought content normal    Nursing note and vitals reviewed

## 2018-12-20 ENCOUNTER — TELEPHONE (OUTPATIENT)
Dept: UROLOGY | Facility: MEDICAL CENTER | Age: 63
End: 2018-12-20

## 2018-12-20 NOTE — TELEPHONE ENCOUNTER
As per authorization office, ok to schedule biofeedback sessions  Spoke with pt and scheduled appt to start biofeedback sessions on 1-8-19

## 2018-12-28 ENCOUNTER — TELEPHONE (OUTPATIENT)
Dept: UROLOGY | Facility: MEDICAL CENTER | Age: 63
End: 2018-12-28

## 2018-12-28 NOTE — TELEPHONE ENCOUNTER
Received [employer] [facility] information request for period 11/1/2018 thru 11/14/2018  Records printed and mailed in the included envelope

## 2019-01-08 ENCOUNTER — PROCEDURE VISIT (OUTPATIENT)
Dept: UROLOGY | Facility: MEDICAL CENTER | Age: 64
End: 2019-01-08
Payer: COMMERCIAL

## 2019-01-08 DIAGNOSIS — N39.3 STRESS INCONTINENCE: Primary | ICD-10-CM

## 2019-01-08 PROCEDURE — 51784 ANAL/URINARY MUSCLE STUDY: CPT

## 2019-01-08 NOTE — PROGRESS NOTES
Urodynamics  Date/Time: 1/8/2019 9:09 AM  Performed by: JOAO CALVILLO  Authorized by: Dustin Nick   Procedure - Urodynamics:  Procedure details: EMG

## 2019-01-08 NOTE — PROGRESS NOTES
BIOFEEDBACK REPORT    Patient was seen today for his 1st sphincter EMG Session  Current progress is as follows:  PT IS DRY OVERNIGHT MOST OF THE TIME  PT IS WEARING 2-3 PADS Q 24 HOURS  Comments:  GREAT TECHNIQUE WITHOUT USING ABDOMINAL MUSCLES AT ALL  Christopher Wilcox PELVIC FLOOR MUSCLE CONTRACTIONS ARE WEAK AND NEED STRENGTHENING  The EMG session was conducted using the small probe in the rectum  THIS SESSION'S READINGS:                                                    CHANNEL A                      CHANNEL B                                 CONTRACTION: 18 8 MV                      5  50 MV                                               RELAXATION: 10 8 MV                     5 55 MV                                                                      ENDURANCE OF INITIAL CONTRACTION: 10 SECONDS        INSTRUCTIONS/PLAN:  INCREASE KEGELS TO 4x10/DAY AND AVOID BLADDER IRRITANTS  RTN PRN FOR NEXT SESSION SINCE PT WAS EXTREMELY ANXIOUS DURING THE BIOFEEDBACK SESSION

## 2019-01-18 LAB — PSA SERPL-MCNC: <0.1 NG/ML

## 2019-01-31 ENCOUNTER — OFFICE VISIT (OUTPATIENT)
Dept: UROLOGY | Facility: MEDICAL CENTER | Age: 64
End: 2019-01-31
Payer: COMMERCIAL

## 2019-01-31 VITALS
SYSTOLIC BLOOD PRESSURE: 152 MMHG | HEIGHT: 74 IN | BODY MASS INDEX: 24.38 KG/M2 | WEIGHT: 190 LBS | DIASTOLIC BLOOD PRESSURE: 98 MMHG | HEART RATE: 54 BPM

## 2019-01-31 DIAGNOSIS — N52.31 ERECTILE DYSFUNCTION FOLLOWING RADICAL PROSTATECTOMY: ICD-10-CM

## 2019-01-31 DIAGNOSIS — Z90.79 HISTORY OF ROBOT-ASSISTED LAPAROSCOPIC RADICAL PROSTATECTOMY: ICD-10-CM

## 2019-01-31 DIAGNOSIS — C61 MALIGNANT NEOPLASM OF PROSTATE (HCC): Primary | ICD-10-CM

## 2019-01-31 LAB
SL AMB  POCT GLUCOSE, UA: NORMAL
SL AMB LEUKOCYTE ESTERASE,UA: NORMAL
SL AMB POCT BILIRUBIN,UA: NORMAL
SL AMB POCT BLOOD,UA: NORMAL
SL AMB POCT CLARITY,UA: CLEAR
SL AMB POCT COLOR,UA: YELLOW
SL AMB POCT KETONES,UA: NORMAL
SL AMB POCT NITRITE,UA: NORMAL
SL AMB POCT PH,UA: 7
SL AMB POCT SPECIFIC GRAVITY,UA: 1.02
SL AMB POCT URINE PROTEIN: NORMAL
SL AMB POCT UROBILINOGEN: 0.2

## 2019-01-31 PROCEDURE — 99214 OFFICE O/P EST MOD 30 MIN: CPT | Performed by: UROLOGY

## 2019-01-31 PROCEDURE — 81003 URINALYSIS AUTO W/O SCOPE: CPT | Performed by: UROLOGY

## 2019-01-31 RX ORDER — TADALAFIL 5 MG/1
2.5 TABLET ORAL DAILY PRN
Qty: 45 TABLET | Refills: 3 | Status: SHIPPED | OUTPATIENT
Start: 2019-01-31 | End: 2019-04-10 | Stop reason: SDUPTHER

## 2019-01-31 NOTE — PROGRESS NOTES
Assessment/Plan:      Diagnoses and all orders for this visit:    Malignant neoplasm of prostate ([facility] [address] )  -     POCT urine dip auto non-scope  -     PSA Total, Diagnostic; Future    History of robot-assisted laparoscopic radical prostatectomy    Erectile dysfunction following radical prostatectomy  -     tadalafil (CIALIS) 5 MG tablet; Take 0 5 tablets (2 5 mg total) by mouth daily as needed for erectile dysfunction        Plan: Will start cycling him with Cialis 2 5 mg daily  Subjective:  No complaints     Patient ID: Millie Sabillon is a 66 y o  male  HPI  He is 3 months after his laparoscopic robotic radical prostatectomy for pT2c pN0 prostate cancer  He has had a steady, reliable and uneventful postoperative recovery  He states he is voiding reasonably well with a good urine control and a good force of stream   He has minimal stress urinary incontinence of a few drops of urine, when he moves from a sitting to standing position  He does report a good force of stream, without having to bear down or strain to generate it  There are no other relatable postoperative issues to report  He denies any abdominal or flank pains, dysuria, or hematuria  Expected erectile dysfunction related to surgery, has questions about instituting treatments  Review of Systems   Constitutional: Negative  HENT: Negative  Eyes: Negative  Respiratory: Negative  Cardiovascular: Negative  Gastrointestinal: Negative  Endocrine: Negative  Genitourinary: Negative  Musculoskeletal: Negative  Skin: Negative  Allergic/Immunologic: Negative  Neurological: Negative  Hematological: Negative  Psychiatric/Behavioral: Negative  Objective:     Physical Exam   Constitutional: He is oriented to person, place, and time  He appears well-developed and well-nourished  No distress  HENT:   Head: Normocephalic and atraumatic     Nose: Nose normal    Mouth/Throat: Oropharynx is clear and moist    Eyes: Pupils are equal, round, and reactive to light  Conjunctivae and EOM are normal  No scleral icterus  Neck: Normal range of motion  Neck supple  Cardiovascular: Normal rate, regular rhythm, normal heart sounds and intact distal pulses  No murmur heard  Pulmonary/Chest: Effort normal and breath sounds normal  No respiratory distress  He has no wheezes  He has no rales  Abdominal: Soft  Bowel sounds are normal  He exhibits no distension and no mass  There is no tenderness  Musculoskeletal: Normal range of motion  He exhibits no edema or tenderness  Lymphadenopathy:     He has no cervical adenopathy  Neurological: He is alert and oriented to person, place, and time  No cranial nerve deficit  Skin: Skin is warm and dry  No rash noted  No erythema  No pallor  Psychiatric: He has a normal mood and affect  His behavior is normal  Judgment and thought content normal    Nursing note and vitals reviewed        PSA from 01/17/2019 is less than 0 1

## 2019-01-31 NOTE — LETTER
January 31, 2019     [place] [address] [facility] [place]: Nhan [place]   YOB: 1955   Date of Visit: 1/31/2019       Dear Dr Sangeeta Dominguez: Thank you for referring Nhan Conrad to me for evaluation  Below are my notes for this consultation  If you have questions, please do not hesitate to call me  I look forward to following your patient along with you           Sincerely,        Sky Guerra MD        CC: No Recipients

## 2019-03-25 ENCOUNTER — TELEPHONE (OUTPATIENT)
Dept: UROLOGY | Facility: AMBULATORY SURGERY CENTER | Age: 64
End: 2019-03-25

## 2019-03-25 NOTE — TELEPHONE ENCOUNTER
Patient called wanting to speak to [facility]  He said he was speaking with her last week and had some questions that were not answered

## 2019-03-26 ENCOUNTER — TELEPHONE (OUTPATIENT)
Dept: UROLOGY | Facility: MEDICAL CENTER | Age: 64
End: 2019-03-26

## 2019-03-26 DIAGNOSIS — N52.31 ERECTILE DYSFUNCTION FOLLOWING RADICAL PROSTATECTOMY: ICD-10-CM

## 2019-03-26 NOTE — TELEPHONE ENCOUNTER
Return call to pt who would like to know if he should increase daily Cialis 2 5mg to 5mg qd  He states he gets no response at all from 2 5mg  He is out of tablets and needs a refill sent to [facility]  Sending to Dr Cedrick Motta for orders

## 2019-03-26 NOTE — TELEPHONE ENCOUNTER
Tell patient no, not to increase the dose  It is still a bit early in his overall recovery for that

## 2019-03-26 NOTE — TELEPHONE ENCOUNTER
Patient was notified not to increase the dose for his Cialis  He also said he only got 18 pills instead of 45, which was ordered   I suggested he call [facility] and talk to them about this,if we can help him with this ,he is to let us know

## 2019-04-10 NOTE — TELEPHONE ENCOUNTER
Spoke with the patient  While we wait for approval with the  Prior Authorization process the patient is completely out of medication  He was willing to pay cash for a script of Tadalafil 5mg to [facility] in [facility]    Current pricing for a 90 day supply is $95   Script for same was queued and forwarded to GRACE [facility] for approval

## 2019-04-10 NOTE — TELEPHONE ENCOUNTER
Patient needs return call re Parvin  He can be reached at [phone]  Stated he's been trying to reach us re meds for 10 days already

## 2019-04-11 RX ORDER — TADALAFIL 5 MG/1
2.5 TABLET ORAL DAILY PRN
Qty: 90 TABLET | Refills: 0 | Status: SHIPPED | OUTPATIENT
Start: 2019-04-11 | End: 2019-07-16 | Stop reason: SDUPTHER

## 2019-04-21 LAB — PSA SERPL-MCNC: <0.1 NG/ML

## 2019-05-02 ENCOUNTER — OFFICE VISIT (OUTPATIENT)
Dept: UROLOGY | Facility: MEDICAL CENTER | Age: 64
End: 2019-05-02
Payer: COMMERCIAL

## 2019-05-02 ENCOUNTER — OFFICE VISIT (OUTPATIENT)
Dept: FAMILY MEDICINE CLINIC | Facility: CLINIC | Age: 64
End: 2019-05-02
Payer: COMMERCIAL

## 2019-05-02 VITALS
SYSTOLIC BLOOD PRESSURE: 130 MMHG | DIASTOLIC BLOOD PRESSURE: 82 MMHG | OXYGEN SATURATION: 98 % | TEMPERATURE: 98 F | HEART RATE: 84 BPM | BODY MASS INDEX: 25.29 KG/M2 | RESPIRATION RATE: 16 BRPM | HEIGHT: 73 IN | WEIGHT: 190.8 LBS

## 2019-05-02 VITALS
HEIGHT: 74 IN | HEART RATE: 68 BPM | BODY MASS INDEX: 23.74 KG/M2 | WEIGHT: 185 LBS | SYSTOLIC BLOOD PRESSURE: 122 MMHG | DIASTOLIC BLOOD PRESSURE: 84 MMHG

## 2019-05-02 DIAGNOSIS — N52.31 ERECTILE DYSFUNCTION FOLLOWING RADICAL PROSTATECTOMY: ICD-10-CM

## 2019-05-02 DIAGNOSIS — N39.3 STRESS INCONTINENCE, MALE: ICD-10-CM

## 2019-05-02 DIAGNOSIS — C61 MALIGNANT NEOPLASM OF PROSTATE (HCC): Primary | ICD-10-CM

## 2019-05-02 DIAGNOSIS — Z90.79 HISTORY OF ROBOT-ASSISTED LAPAROSCOPIC RADICAL PROSTATECTOMY: ICD-10-CM

## 2019-05-02 DIAGNOSIS — L29.0 RECTAL ITCHING: Primary | ICD-10-CM

## 2019-05-02 LAB
SL AMB  POCT GLUCOSE, UA: ABNORMAL
SL AMB LEUKOCYTE ESTERASE,UA: ABNORMAL
SL AMB POCT BILIRUBIN,UA: ABNORMAL
SL AMB POCT BLOOD,UA: ABNORMAL
SL AMB POCT CLARITY,UA: CLEAR
SL AMB POCT COLOR,UA: YELLOW
SL AMB POCT KETONES,UA: ABNORMAL
SL AMB POCT NITRITE,UA: ABNORMAL
SL AMB POCT PH,UA: 6
SL AMB POCT SPECIFIC GRAVITY,UA: 1.02
SL AMB POCT URINE PROTEIN: ABNORMAL
SL AMB POCT UROBILINOGEN: 0.2

## 2019-05-02 PROCEDURE — 81003 URINALYSIS AUTO W/O SCOPE: CPT | Performed by: UROLOGY

## 2019-05-02 PROCEDURE — 99214 OFFICE O/P EST MOD 30 MIN: CPT | Performed by: UROLOGY

## 2019-05-02 PROCEDURE — 3008F BODY MASS INDEX DOCD: CPT | Performed by: FAMILY MEDICINE

## 2019-05-02 PROCEDURE — 99214 OFFICE O/P EST MOD 30 MIN: CPT | Performed by: FAMILY MEDICINE

## 2019-05-02 RX ORDER — L. ACIDOPHILUS/BIFID. ANIMALIS 2B CELL
1 CAPSULE ORAL DAILY
Qty: 30 CAPSULE | Refills: 0 | Status: SHIPPED | OUTPATIENT
Start: 2019-05-02 | End: 2019-11-08 | Stop reason: ALTCHOICE

## 2019-05-06 ENCOUNTER — TELEPHONE (OUTPATIENT)
Dept: FAMILY MEDICINE CLINIC | Facility: CLINIC | Age: 64
End: 2019-05-06

## 2019-05-06 DIAGNOSIS — L29.0 RECTAL ITCHING: Primary | ICD-10-CM

## 2019-06-04 LAB
ALBUMIN SERPL-MCNC: 4.3 G/DL (ref 3.6–5.1)
ALBUMIN/GLOB SERPL: 1.7 (CALC) (ref 1–2.5)
ALP SERPL-CCNC: 55 U/L (ref 40–115)
ALT SERPL-CCNC: 24 U/L (ref 9–46)
AST SERPL-CCNC: 21 U/L (ref 10–35)
BILIRUB SERPL-MCNC: 0.8 MG/DL (ref 0.2–1.2)
BUN SERPL-MCNC: 17 MG/DL (ref 7–25)
BUN/CREAT SERPL: NORMAL (CALC) (ref 6–22)
CALCIUM SERPL-MCNC: 9.7 MG/DL (ref 8.6–10.3)
CHLORIDE SERPL-SCNC: 101 MMOL/L (ref 98–110)
CHOLEST SERPL-MCNC: 163 MG/DL
CHOLEST/HDLC SERPL: 2.4 (CALC)
CO2 SERPL-SCNC: 31 MMOL/L (ref 20–32)
CREAT SERPL-MCNC: 1.11 MG/DL (ref 0.7–1.25)
GLOBULIN SER CALC-MCNC: 2.5 G/DL (CALC) (ref 1.9–3.7)
GLUCOSE SERPL-MCNC: 91 MG/DL (ref 65–99)
HDLC SERPL-MCNC: 67 MG/DL
LDLC SERPL CALC-MCNC: 80 MG/DL (CALC)
NONHDLC SERPL-MCNC: 96 MG/DL (CALC)
POTASSIUM SERPL-SCNC: 4.1 MMOL/L (ref 3.5–5.3)
PROT SERPL-MCNC: 6.8 G/DL (ref 6.1–8.1)
SL AMB EGFR AFRICAN AMERICAN: 83 ML/MIN/1.73M2
SL AMB EGFR NON AFRICAN AMERICAN: 71 ML/MIN/1.73M2
SODIUM SERPL-SCNC: 137 MMOL/L (ref 135–146)
TRIGL SERPL-MCNC: 75 MG/DL
TSH SERPL-ACNC: 1.55 MIU/L (ref 0.4–4.5)

## 2019-06-07 ENCOUNTER — OFFICE VISIT (OUTPATIENT)
Dept: FAMILY MEDICINE CLINIC | Facility: CLINIC | Age: 64
End: 2019-06-07
Payer: COMMERCIAL

## 2019-06-07 VITALS
BODY MASS INDEX: 25.1 KG/M2 | DIASTOLIC BLOOD PRESSURE: 80 MMHG | HEIGHT: 73 IN | WEIGHT: 189.4 LBS | SYSTOLIC BLOOD PRESSURE: 110 MMHG | HEART RATE: 89 BPM | TEMPERATURE: 98 F | OXYGEN SATURATION: 98 %

## 2019-06-07 DIAGNOSIS — L29.0 RECTAL ITCHING: Primary | ICD-10-CM

## 2019-06-07 DIAGNOSIS — Z11.59 ENCOUNTER FOR HEPATITIS C SCREENING TEST FOR LOW RISK PATIENT: ICD-10-CM

## 2019-06-07 DIAGNOSIS — E78.5 HYPERLIPIDEMIA, UNSPECIFIED HYPERLIPIDEMIA TYPE: ICD-10-CM

## 2019-06-07 DIAGNOSIS — C61 MALIGNANT NEOPLASM OF PROSTATE (HCC): ICD-10-CM

## 2019-06-07 PROBLEM — S20.96XA: Status: RESOLVED | Noted: 2017-10-19 | Resolved: 2019-06-07
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
## 2019-06-07 PROCEDURE — 99214 OFFICE O/P EST MOD 30 MIN: CPT | Performed by: FAMILY MEDICINE

## 2019-06-07 PROCEDURE — 1036F TOBACCO NON-USER: CPT | Performed by: FAMILY MEDICINE

## 2019-07-16 DIAGNOSIS — N52.31 ERECTILE DYSFUNCTION FOLLOWING RADICAL PROSTATECTOMY: ICD-10-CM

## 2019-07-16 RX ORDER — TADALAFIL 5 MG/1
5 TABLET ORAL DAILY
Qty: 90 TABLET | Refills: 1 | Status: SHIPPED | OUTPATIENT
Start: 2019-07-16 | End: 2019-12-13 | Stop reason: ALTCHOICE

## 2019-07-16 NOTE — TELEPHONE ENCOUNTER
Patient left message on the Medication Refill voice mail line requesting an increase in dosage on his Tadalafil  He gets NO physical response to the 2 5mg dosage  He would like to increase it to one whole tablet (5mg)    Script for Tadalafil 5mg - Si PO QD #90 with 1 refill was queued and forwarded to GRACE [place] for approval

## 2019-08-05 DIAGNOSIS — E78.2 MIXED HYPERLIPIDEMIA: ICD-10-CM

## 2019-08-05 RX ORDER — EZETIMIBE AND SIMVASTATIN 10; 10 MG/1; MG/1
TABLET ORAL
Qty: 90 TABLET | Refills: 1 | Status: SHIPPED | OUTPATIENT
Start: 2019-08-05 | End: 2020-04-07

## 2019-11-02 LAB — PSA SERPL-MCNC: <0.1 NG/ML

## 2019-11-08 ENCOUNTER — OFFICE VISIT (OUTPATIENT)
Dept: UROLOGY | Facility: MEDICAL CENTER | Age: 64
End: 2019-11-08
Payer: COMMERCIAL

## 2019-11-08 VITALS
DIASTOLIC BLOOD PRESSURE: 70 MMHG | SYSTOLIC BLOOD PRESSURE: 110 MMHG | BODY MASS INDEX: 25.45 KG/M2 | HEART RATE: 91 BPM | WEIGHT: 192 LBS | HEIGHT: 73 IN

## 2019-11-08 DIAGNOSIS — N52.31 ERECTILE DYSFUNCTION FOLLOWING RADICAL PROSTATECTOMY: ICD-10-CM

## 2019-11-08 DIAGNOSIS — Z90.79 HISTORY OF ROBOT-ASSISTED LAPAROSCOPIC RADICAL PROSTATECTOMY: Primary | ICD-10-CM

## 2019-11-08 DIAGNOSIS — C61 MALIGNANT NEOPLASM OF PROSTATE (HCC): ICD-10-CM

## 2019-11-08 LAB
SL AMB  POCT GLUCOSE, UA: NORMAL
SL AMB LEUKOCYTE ESTERASE,UA: NORMAL
SL AMB POCT BILIRUBIN,UA: NORMAL
SL AMB POCT BLOOD,UA: NORMAL
SL AMB POCT CLARITY,UA: CLEAR
SL AMB POCT COLOR,UA: YELLOW
SL AMB POCT KETONES,UA: NORMAL
SL AMB POCT NITRITE,UA: NORMAL
SL AMB POCT PH,UA: 5.5
SL AMB POCT SPECIFIC GRAVITY,UA: >=1.03
SL AMB POCT URINE PROTEIN: NORMAL
SL AMB POCT UROBILINOGEN: 0.2

## 2019-11-08 PROCEDURE — 81003 URINALYSIS AUTO W/O SCOPE: CPT | Performed by: UROLOGY

## 2019-11-08 PROCEDURE — 99214 OFFICE O/P EST MOD 30 MIN: CPT | Performed by: UROLOGY

## 2019-11-08 NOTE — PROGRESS NOTES
Assessment/Plan:      Diagnoses and all orders for this visit:    History of robot-assisted laparoscopic radical prostatectomy  -     POCT urine dip auto non-scope    Erectile dysfunction following radical prostatectomy  -     Ambulatory referral to Urology; Future    Malignant neoplasm of prostate ([facility])  -     PSA Total, Diagnostic; Future        Plan: Will refer to Dr Kati Benítez for consultation concerning ED  Patient with should resume doing Kegel exercises  Subjective:  ED     Patient ID: Tad Roblero is a 60 y o  male  HPI  He is 1 year after his laparoscopic robotic radical prostatectomy for pT2c pN0 prostate cancer  Naya Cantor has had a steady, reliable and uneventful postoperative recovery  Naya Cantor states he is voiding reasonably well with a good urine control and a good force of stream   He still has very minimal stress urinary incontinence of a few drops of urine, when he moves from a sitting to standing position, but he has stopped doing his Kegel exercises  He does report a good force of stream, without having to bear down or strain to generate it  Buzzy Mad are no other relatable postoperative issues to report  [facility] denies any abdominal or flank pains, dysuria, or hematuria      The patient had preop ED requiring medications  Expected erectile dysfunction related to surgery more apparent, has not seen any response response with the daily Cialis, and has questions about other options      Review of Systems   Constitutional: Negative  HENT: Negative  Eyes: Negative  Respiratory: Negative  Cardiovascular: Negative  Gastrointestinal: Negative  Endocrine: Negative  Genitourinary: Negative  Musculoskeletal: Negative  Skin: Negative  Allergic/Immunologic: Negative  Neurological: Negative  Hematological: Negative  Psychiatric/Behavioral: Negative  Objective:     Physical Exam   Constitutional: He is oriented to person, place, and time   He appears well-developed and well-nourished  No distress  HENT:   Head: Normocephalic and atraumatic  Nose: Nose normal    Mouth/Throat: Oropharynx is clear and moist    Eyes: Pupils are equal, round, and reactive to light  Conjunctivae and EOM are normal  No scleral icterus  Neck: Normal range of motion  Neck supple  Cardiovascular: Normal rate, regular rhythm, normal heart sounds and intact distal pulses  No murmur heard  Pulmonary/Chest: Effort normal and breath sounds normal  No respiratory distress  He has no wheezes  He has no rales  Abdominal: Soft  Bowel sounds are normal  He exhibits no distension and no mass  There is no tenderness  Musculoskeletal: Normal range of motion  He exhibits no edema or tenderness  Lymphadenopathy:     He has no cervical adenopathy  Neurological: He is alert and oriented to person, place, and time  No cranial nerve deficit  Skin: Skin is warm and dry  No rash noted  No erythema  No pallor  Psychiatric: He has a normal mood and affect  His behavior is normal  Judgment and thought content normal    Nursing note and vitals reviewed        PSA, Total Screen    Ref Range & Units 11/1/19  8:00 AM 4/20/19 10:39 AM 1/17/19  2:23 PM   Prostate Specific Antigen Total < OR = 4 0 ng/mL <0 1  <0 1 CM <0 1 CM

## 2019-11-11 ENCOUNTER — IMMUNIZATIONS (OUTPATIENT)
Dept: FAMILY MEDICINE CLINIC | Facility: CLINIC | Age: 64
End: 2019-11-11
Payer: COMMERCIAL

## 2019-11-11 VITALS — TEMPERATURE: 98.5 F

## 2019-11-11 DIAGNOSIS — Z23 ENCOUNTER FOR IMMUNIZATION: ICD-10-CM

## 2019-11-11 PROCEDURE — 90471 IMMUNIZATION ADMIN: CPT | Performed by: FAMILY MEDICINE

## 2019-11-11 PROCEDURE — 90682 RIV4 VACC RECOMBINANT DNA IM: CPT | Performed by: FAMILY MEDICINE

## 2019-11-21 ENCOUNTER — OFFICE VISIT (OUTPATIENT)
Dept: PODIATRY | Facility: CLINIC | Age: 64
End: 2019-11-21
Payer: COMMERCIAL

## 2019-11-21 VITALS
HEART RATE: 75 BPM | SYSTOLIC BLOOD PRESSURE: 127 MMHG | WEIGHT: 192.4 LBS | HEIGHT: 73 IN | BODY MASS INDEX: 25.5 KG/M2 | DIASTOLIC BLOOD PRESSURE: 81 MMHG

## 2019-11-21 DIAGNOSIS — M20.22 HALLUX RIGIDUS OF LEFT FOOT: ICD-10-CM

## 2019-11-21 DIAGNOSIS — M20.21 HALLUX RIGIDUS OF RIGHT FOOT: Primary | ICD-10-CM

## 2019-11-21 DIAGNOSIS — M79.672 LEFT FOOT PAIN: ICD-10-CM

## 2019-11-21 DIAGNOSIS — M79.671 RIGHT FOOT PAIN: ICD-10-CM

## 2019-11-21 PROCEDURE — 20550 NJX 1 TENDON SHEATH/LIGAMENT: CPT | Performed by: PODIATRIST

## 2019-11-21 PROCEDURE — 99213 OFFICE O/P EST LOW 20 MIN: CPT | Performed by: PODIATRIST

## 2019-11-21 RX ORDER — TRIAMCINOLONE ACETONIDE 40 MG/ML
20 INJECTION, SUSPENSION INTRA-ARTICULAR; INTRAMUSCULAR ONCE
Status: COMPLETED | OUTPATIENT
Start: 2019-11-21 | End: 2019-11-21

## 2019-11-21 RX ORDER — LIDOCAINE HYDROCHLORIDE 10 MG/ML
1 INJECTION, SOLUTION INFILTRATION; PERINEURAL ONCE
Status: COMPLETED | OUTPATIENT
Start: 2019-11-21 | End: 2019-11-21

## 2019-11-21 RX ADMIN — LIDOCAINE HYDROCHLORIDE 1 ML: 10 INJECTION, SOLUTION INFILTRATION; PERINEURAL at 10:41

## 2019-11-21 RX ADMIN — TRIAMCINOLONE ACETONIDE 20 MG: 40 INJECTION, SUSPENSION INTRA-ARTICULAR; INTRAMUSCULAR at 10:41

## 2019-11-21 NOTE — PROGRESS NOTES
Assessment/Plan:    Reviewed treatment options for hallux rigidus  In order to correct this disorder surgery is needed  Recommended right silastic hinged implant for correction over Cartiva implant due to severity of the arthritis  Patient still not ready for surgery  Injected lateral aspect each 1st MPJ with 0 5 cc Kenalog 40 along with 1 cc 1% xylocaine  Reappoint p r n  No problem-specific Assessment & Plan notes found for this encounter  Diagnoses and all orders for this visit:    Hallux rigidus of right foot  -     lidocaine (XYLOCAINE) 1 % injection 1 mL  -     triamcinolone acetonide (KENALOG-40) 40 mg/mL injection 20 mg    Hallux rigidus of left foot  -     lidocaine (XYLOCAINE) 1 % injection 1 mL  -     triamcinolone acetonide (KENALOG-40) 40 mg/mL injection 20 mg    Right foot pain    Left foot pain          Subjective:      Patient ID: Autumn Macdonald is a 60 y o  male  HPI     Patient presents for assessment of great toe joints both feet  Patient has had chronic pain in each great toe joint due to hallux rigidus  He generally responds well to cortisone injection and meloxicam   He typically gets relief from these injections for approximately 6 months  His last injections were 8 months ago  The following portions of the patient's history were reviewed and updated as appropriate: allergies, current medications, past family history, past medical history, past social history, past surgical history and problem list     Review of Systems   Constitutional: Negative  Respiratory: Negative  Cardiovascular: Negative  Gastrointestinal: Negative  Musculoskeletal: Positive for gait problem  Objective:      /81   Pulse 75   Ht 6' 0 5" (1 842 m)   Wt 87 3 kg (192 lb 6 4 oz)   BMI 25 74 kg/m²          Physical Exam   Constitutional: He is oriented to person, place, and time  Cardiovascular: Regular rhythm and intact distal pulses     Musculoskeletal: He exhibits tenderness and deformity  Severe hallux rigidus bilateral   Range of motion right 1st MPJ and dorsiflexion is 5°  Range of motion left 1st MPJ and dorsiflexion is 0°  Pain with palpation lateral aspect each 1st MPJ  Neurological: He is oriented to person, place, and time  Skin: Skin is warm  No rash noted  No erythema  Foot injection     Date/Time 11/21/2019 10:51 AM     Performed by  Yuri Snyder DPM     Authorized by Yuri Snyder DPM      Universal Protocol Consent: Verbal consent obtained  Written consent not obtained  Consent given by: patient  Patient understanding: patient states understanding of the procedure being performed  Patient consent: the patient's understanding of the procedure matches consent given  Patient identity confirmed: verbally with patient        Site preparation: Isopropyl alcohol    Local anesthesia used: yes     Anesthesia   Local anesthesia used: yes  Local Anesthetic: lidocaine 1% without epinephrine     Procedure Details   Procedure Notes: Injected lateral aspect right 1st MPJ with 0 5 cc Kenalog 40 along with 1 cc 1% xylocaine  Foot injection     Date/Time 11/21/2019 10:52 AM     Performed by  Yuri Snyder DPM     Authorized by Yuri Snyder DPM      Universal Protocol Consent: Verbal consent obtained  Written consent not obtained  Risks and benefits: risks, benefits and alternatives were discussed  Consent given by: patient  Patient understanding: patient states understanding of the procedure being performed  Patient identity confirmed: verbally with patient        Site preparation: Isopropyl alcohol    Local anesthesia used: yes     Anesthesia   Local anesthesia used: yes  Local Anesthetic: lidocaine 1% without epinephrine     Procedure Details   Procedure Notes: Injected lateral aspect 1st MPJ left foot with 0 5 cc Kenalog 40 along with 1 cc 1% xylocaine

## 2019-11-27 ENCOUNTER — OFFICE VISIT (OUTPATIENT)
Dept: UROLOGY | Facility: MEDICAL CENTER | Age: 64
End: 2019-11-27
Payer: COMMERCIAL

## 2019-11-27 VITALS
DIASTOLIC BLOOD PRESSURE: 76 MMHG | BODY MASS INDEX: 25.71 KG/M2 | SYSTOLIC BLOOD PRESSURE: 140 MMHG | HEART RATE: 99 BPM | WEIGHT: 194 LBS | HEIGHT: 73 IN

## 2019-11-27 DIAGNOSIS — C61 MALIGNANT NEOPLASM OF PROSTATE (HCC): ICD-10-CM

## 2019-11-27 DIAGNOSIS — N52.31 ERECTILE DYSFUNCTION AFTER RADICAL PROSTATECTOMY: Primary | ICD-10-CM

## 2019-11-27 PROCEDURE — 99214 OFFICE O/P EST MOD 30 MIN: CPT | Performed by: UROLOGY

## 2019-11-27 RX ORDER — TADALAFIL 20 MG/1
20 TABLET ORAL DAILY PRN
Qty: 5 TABLET | Status: SHIPPED | OUTPATIENT
Start: 2019-11-27 | End: 2019-12-13 | Stop reason: ALTCHOICE

## 2019-11-27 NOTE — PROGRESS NOTES
Assessment/Plan:  1  Erectile dysfunction secondary to radical prostatectomy-the patient and I spent time discussing options for therapy including higher dose or on demand PDE 5 inhibition and the patient was given a prescription for 20 mg of Cialis to be used on demand  If this does not work the patient was given a prescription for alprostadil which he will fill and will return to the office for penile injection therapy  We discussed options for treatment including PDE 5 inhibitors penile injection therapy vacuum devices use and penile implant surgery  2  Stress urinary incontinence status post radical prostatectomy-the patient continues with Kegel exercises and has had improved levels of continence noting that he is using 1 depends daily with minimal wetting  3  Adenocarcinoma the prostate status post radical prostatectomy 1 year ago-PSA remains undetectable with no sign of recurrent cancer  No problem-specific Assessment & Plan notes found for this encounter  Diagnoses and all orders for this visit:    Erectile dysfunction after radical prostatectomy  -     tadalafil (CIALIS) 20 MG tablet; Take 1 tablet (20 mg total) by mouth daily as needed for erectile dysfunction  -     PROSTAGLANDIN PGE1 INJECTABLE 20 MCG/ML, STANDARD, IJ SOLN; 1 mL by Intracavernosal route once for 1 dose  -     Testosterone, free, total; Future    Malignant neoplasm of prostate (HCC)          Subjective:      Patient ID: Kulwinder Mcneill is a 60 y o  male  HPI  60-year-old male who is now 1 year status post radical prostatectomy presents for evaluation of erectile dysfunction  The patient had erectile dysfunction prior to prostate cancer surgery noting that he was taking approximately 10 mg of Cialis with an excellent response at that time    Since radical prostatectomy 1 year ago however the patient has been experiencing mild to moderate stress urinary incontinence which is improving with Kegel exercises and notes no significant erectile functioning whatsoever despite Cialis on a daily basis 5 mg  The patient denies any side effects from PDE 5 inhibition and presents to discuss further therapy including injection therapy on demand PDE 5 inhibition or even surgical therapy  The following portions of the patient's history were reviewed and updated as appropriate: allergies, current medications, past family history, past medical history, past social history, past surgical history and problem list     Review of Systems   Genitourinary:        See HPI   All other systems reviewed and are negative  Objective:      /76   Pulse 99   Ht 6' 0 5" (1 842 m)   Wt 88 kg (194 lb)   BMI 25 95 kg/m²          Physical Exam   Constitutional: He is oriented to person, place, and time  He appears well-developed and well-nourished  No distress  HENT:   Head: Normocephalic and atraumatic  Eyes: EOM are normal    Neck: Neck supple  Pulmonary/Chest: Effort normal    Abdominal: Soft  Genitourinary:   Genitourinary Comments: Phallus normal circumcised without Peyronie's plaques, scrotum with old scrotal healed incision normal testes bilaterally without masses atrophy or tenderness and normal adnexa bilaterally   Neurological: He is alert and oriented to person, place, and time  Skin: Skin is warm and dry  Psychiatric: He has a normal mood and affect  His behavior is normal  Judgment and thought content normal    Vitals reviewed

## 2019-12-05 LAB
ALBUMIN SERPL-MCNC: 4.6 G/DL (ref 3.6–5.1)
ALBUMIN/GLOB SERPL: 1.7 (CALC) (ref 1–2.5)
ALP SERPL-CCNC: 62 U/L (ref 40–115)
ALT SERPL-CCNC: 37 U/L (ref 9–46)
AST SERPL-CCNC: 29 U/L (ref 10–35)
BILIRUB SERPL-MCNC: 1.1 MG/DL (ref 0.2–1.2)
BUN SERPL-MCNC: 22 MG/DL (ref 7–25)
BUN/CREAT SERPL: NORMAL (CALC) (ref 6–22)
CALCIUM SERPL-MCNC: 9.5 MG/DL (ref 8.6–10.3)
CHLORIDE SERPL-SCNC: 100 MMOL/L (ref 98–110)
CHOLEST SERPL-MCNC: 194 MG/DL
CHOLEST/HDLC SERPL: 2.8 (CALC)
CO2 SERPL-SCNC: 29 MMOL/L (ref 20–32)
CREAT SERPL-MCNC: 1.03 MG/DL (ref 0.7–1.25)
GLOBULIN SER CALC-MCNC: 2.7 G/DL (CALC) (ref 1.9–3.7)
GLUCOSE SERPL-MCNC: 89 MG/DL (ref 65–99)
HCV AB S/CO SERPL IA: 0.01
HCV AB SERPL QL IA: NORMAL
HDLC SERPL-MCNC: 70 MG/DL
LDLC SERPL CALC-MCNC: 103 MG/DL (CALC)
NONHDLC SERPL-MCNC: 124 MG/DL (CALC)
POTASSIUM SERPL-SCNC: 4.3 MMOL/L (ref 3.5–5.3)
PROT SERPL-MCNC: 7.3 G/DL (ref 6.1–8.1)
SL AMB EGFR AFRICAN AMERICAN: 90 ML/MIN/1.73M2
SL AMB EGFR NON AFRICAN AMERICAN: 77 ML/MIN/1.73M2
SODIUM SERPL-SCNC: 136 MMOL/L (ref 135–146)
TRIGL SERPL-MCNC: 116 MG/DL
TSH SERPL-ACNC: 1.66 MIU/L (ref 0.4–4.5)

## 2019-12-08 LAB
TESTOST FREE SERPL-MCNC: 58.5 PG/ML (ref 35–155)
TESTOST SERPL-MCNC: 327 NG/DL (ref 250–1100)

## 2019-12-13 ENCOUNTER — OFFICE VISIT (OUTPATIENT)
Dept: FAMILY MEDICINE CLINIC | Facility: CLINIC | Age: 64
End: 2019-12-13
Payer: COMMERCIAL

## 2019-12-13 VITALS
HEART RATE: 82 BPM | DIASTOLIC BLOOD PRESSURE: 78 MMHG | TEMPERATURE: 98.1 F | OXYGEN SATURATION: 97 % | HEIGHT: 73 IN | WEIGHT: 195 LBS | BODY MASS INDEX: 25.84 KG/M2 | SYSTOLIC BLOOD PRESSURE: 124 MMHG

## 2019-12-13 DIAGNOSIS — E78.2 MIXED HYPERLIPIDEMIA: ICD-10-CM

## 2019-12-13 DIAGNOSIS — H66.90 ACUTE OTITIS MEDIA, UNSPECIFIED OTITIS MEDIA TYPE: Primary | ICD-10-CM

## 2019-12-13 PROCEDURE — 99213 OFFICE O/P EST LOW 20 MIN: CPT | Performed by: FAMILY MEDICINE

## 2019-12-13 PROCEDURE — 3008F BODY MASS INDEX DOCD: CPT | Performed by: FAMILY MEDICINE

## 2019-12-13 RX ORDER — AMOXICILLIN AND CLAVULANATE POTASSIUM 875; 125 MG/1; MG/1
1 TABLET, FILM COATED ORAL EVERY 12 HOURS SCHEDULED
Qty: 20 TABLET | Refills: 0 | Status: SHIPPED | OUTPATIENT
Start: 2019-12-13 | End: 2019-12-23

## 2019-12-13 NOTE — PROGRESS NOTES
[facility]      NAME: Jann Hamilton  AGE: 60 y o  SEX: male  : 1955   MRN: [id number]    DATE: 2019  TIME: 10:16 AM    Assessment and Plan     Problem List Items Addressed This Visit     Hyperlipidemia     Well controlled on Vytorin with LDL of 103 and HDL of 70  Continue current dosage         Relevant Orders    Comprehensive metabolic panel    Lipid Panel with Direct LDL reflex    TSH, 3rd generation      Other Visit Diagnoses     Acute otitis media, unspecified otitis media type    -  Primary    Relevant Medications    amoxicillin-clavulanate (AUGMENTIN) 875-125 mg per tablet        BMI Counseling: Body mass index is 26 08 kg/m²  The BMI is above normal  Nutrition recommendations include decreasing portion sizes, decreasing fast food intake, limiting drinks that contain sugar, moderation in carbohydrate intake and increasing intake of lean protein  Exercise recommendations include exercising 3-5 times per week  No pharmacotherapy was ordered  Return to office in:  6 months    Chief Complaint     Chief Complaint   Patient presents with    Follow-up     6 month    Cough     x5 days   Nasal Congestion     chest congestion x5 days       History of Present Illness     Patient returns to the office to follow-up on chronic medical problems  He is being treated for hyperlipidemia with Vytorin  He is also following with his urologist for follow-up of his prostate cancer  He is doing well in that regard with the exception his EGD  He does complain today however of some ear pain left greater than right  This is occurred after a recent trip to [place] where he flu  He has been taking DayQuil to try and control the discomfort        The following portions of the patient's history were reviewed and updated as appropriate: allergies, current medications, past family history, past medical history, past social history, past surgical history and problem list     Review of Systems   Review of Systems   Constitutional: Negative  HENT: Positive for ear pain  Respiratory: Negative  Cardiovascular: Negative  Gastrointestinal: Negative  Genitourinary: Negative  Musculoskeletal: Negative  Psychiatric/Behavioral: Negative  Active Problem List     Patient Active Problem List   Diagnosis    Arthritis of knee, right    Erectile dysfunction of non-organic origin    Hyperlipidemia    IBS (irritable bowel syndrome)    Vitamin D deficiency    Elevated PSA    BPH with obstruction/lower urinary tract symptoms    Malignant neoplasm prostate ([address] )    Malignant neoplasm of prostate (HCC)       Objective   /78   Pulse 82   Temp 98 1 °F (36 7 °C) (Tympanic)   Ht 6' 0 5" (1 842 m)   Wt 88 5 kg (195 lb)   SpO2 97%   BMI 26 08 kg/m²     Physical Exam   Constitutional: He is oriented to person, place, and time  He appears well-developed and well-nourished  No distress  HENT:   Head: Normocephalic and atraumatic  Left tympanic membrane erythematous and bulging   Eyes: Pupils are equal, round, and reactive to light  Conjunctivae are normal  Right eye exhibits no discharge  Neck: Normal range of motion  No thyromegaly present  Cardiovascular: Normal rate and regular rhythm  Pulmonary/Chest: Effort normal and breath sounds normal  No respiratory distress  Lymphadenopathy:     He has no cervical adenopathy  Neurological: He is alert and oriented to person, place, and time  Skin: Skin is warm and dry  He is not diaphoretic  Psychiatric: He has a normal mood and affect  His behavior is normal  Judgment and thought content normal    Nursing note and vitals reviewed          Current Medications     Current Outpatient Medications:     aspirin (ECOTRIN LOW STRENGTH) 81 mg EC tablet, Take 81 mg by mouth daily, Disp: , Rfl:     ezetimibe-simvastatin (VYTORIN) 10-10 mg per tablet, TAKE 1 TABLET DAILY, Disp: 90 tablet, Rfl: 1    amoxicillin-clavulanate (AUGMENTIN) 875-125 mg per tablet, Take 1 tablet by mouth every 12 (twelve) hours for 10 days, Disp: 20 tablet, Rfl: 0    Health Maintenance     Health Maintenance   Topic Date Due    DTaP,Tdap,and Td Vaccines (1 - Tdap) 07/21/1968    HIV Screening  07/21/1972    BMI: Followup Plan  07/21/1975    CRC Screening: Colonoscopy  09/30/2019    BMI: Adult  11/27/2020    Depression Screening PHQ  12/13/2020    Pneumococcal Vaccine: 65+ Years (1 of 2 - PCV13) 07/21/2022    Hepatitis C Screening  Completed    Influenza Vaccine  Completed    Pneumococcal Vaccine: Pediatrics (0 to 5 Years) and At-Risk Patients (6 to 59 Years)  Aged Out    HIB Vaccine  Aged Out    Hepatitis B Vaccine  Aged Out    IPV Vaccine  Aged Out    Hepatitis A Vaccine  Aged Out    Meningococcal ACWY Vaccine  Aged Out    HPV Vaccine  Aged [employer] History   Administered Date(s) Administered    H1N1, All Formulations 12/28/2009    INFLUENZA 10/03/2016    Influenza Quadrivalent, 6-35 Months IM 10/03/2016, 11/06/2017    Influenza TIV (IM) 01/01/2011, 11/08/2012, 11/25/2014, 12/04/2015    Influenza, recombinant, quadrivalent,injectable, preservative free 10/29/2018, 11/11/2019    Zoster 11/06/2017       Jo Ann Alvarado DO  [facility]

## 2019-12-21 ENCOUNTER — HOSPITAL ENCOUNTER (OUTPATIENT)
Facility: HOSPITAL | Age: 64
Setting detail: OUTPATIENT SURGERY
Discharge: HOME | End: 2019-12-22
Attending: EMERGENCY MEDICINE | Admitting: SURGERY
Payer: COMMERCIAL

## 2019-12-21 ENCOUNTER — TELEPHONE (OUTPATIENT)
Dept: OTHER | Facility: OTHER | Age: 64
End: 2019-12-21

## 2019-12-21 ENCOUNTER — ANESTHESIA (EMERGENCY)
Dept: PERIOP | Facility: HOSPITAL | Age: 64
End: 2019-12-21
Payer: COMMERCIAL

## 2019-12-21 ENCOUNTER — ANESTHESIA EVENT (EMERGENCY)
Dept: PERIOP | Facility: HOSPITAL | Age: 64
End: 2019-12-21
Payer: COMMERCIAL

## 2019-12-21 ENCOUNTER — APPOINTMENT (EMERGENCY)
Dept: CT IMAGING | Facility: HOSPITAL | Age: 64
End: 2019-12-21
Payer: COMMERCIAL

## 2019-12-21 DIAGNOSIS — K35.80 ACUTE APPENDICITIS: Primary | ICD-10-CM

## 2019-12-21 LAB
ALBUMIN SERPL BCP-MCNC: 3.9 G/DL (ref 3.5–5)
ALP SERPL-CCNC: 80 U/L (ref 46–116)
ALT SERPL W P-5'-P-CCNC: 64 U/L (ref 12–78)
ANION GAP SERPL CALCULATED.3IONS-SCNC: 9 MMOL/L (ref 4–13)
AST SERPL W P-5'-P-CCNC: 26 U/L (ref 5–45)
BACTERIA UR QL AUTO: ABNORMAL /HPF
BASOPHILS # BLD AUTO: 0.05 THOUSANDS/ΜL (ref 0–0.1)
BASOPHILS NFR BLD AUTO: 0 % (ref 0–1)
BILIRUB SERPL-MCNC: 0.88 MG/DL (ref 0.2–1)
BILIRUB UR QL STRIP: NEGATIVE
BUN SERPL-MCNC: 23 MG/DL (ref 5–25)
CALCIUM SERPL-MCNC: 9.2 MG/DL (ref 8.3–10.1)
CHLORIDE SERPL-SCNC: 102 MMOL/L (ref 100–108)
CLARITY UR: CLEAR
CO2 SERPL-SCNC: 30 MMOL/L (ref 21–32)
COLOR UR: YELLOW
CREAT SERPL-MCNC: 0.99 MG/DL (ref 0.6–1.3)
EOSINOPHIL # BLD AUTO: 0.12 THOUSAND/ΜL (ref 0–0.61)
EOSINOPHIL NFR BLD AUTO: 1 % (ref 0–6)
ERYTHROCYTE [DISTWIDTH] IN BLOOD BY AUTOMATED COUNT: 13.2 % (ref 11.6–15.1)
GFR SERPL CREATININE-BSD FRML MDRD: 81 ML/MIN/1.73SQ M
GLUCOSE SERPL-MCNC: 100 MG/DL (ref 65–140)
GLUCOSE UR STRIP-MCNC: NEGATIVE MG/DL
HCT VFR BLD AUTO: 45.6 % (ref 36.5–49.3)
HGB BLD-MCNC: 14.7 G/DL (ref 12–17)
HGB UR QL STRIP.AUTO: ABNORMAL
IMM GRANULOCYTES # BLD AUTO: 0.06 THOUSAND/UL (ref 0–0.2)
IMM GRANULOCYTES NFR BLD AUTO: 0 % (ref 0–2)
KETONES UR STRIP-MCNC: ABNORMAL MG/DL
LEUKOCYTE ESTERASE UR QL STRIP: NEGATIVE
LIPASE SERPL-CCNC: 112 U/L (ref 73–393)
LYMPHOCYTES # BLD AUTO: 1.89 THOUSANDS/ΜL (ref 0.6–4.47)
LYMPHOCYTES NFR BLD AUTO: 13 % (ref 14–44)
MCH RBC QN AUTO: 30.6 PG (ref 26.8–34.3)
MCHC RBC AUTO-ENTMCNC: 32.2 G/DL (ref 31.4–37.4)
MCV RBC AUTO: 95 FL (ref 82–98)
MONOCYTES # BLD AUTO: 1.32 THOUSAND/ΜL (ref 0.17–1.22)
MONOCYTES NFR BLD AUTO: 9 % (ref 4–12)
MUCOUS THREADS UR QL AUTO: ABNORMAL
NEUTROPHILS # BLD AUTO: 10.81 THOUSANDS/ΜL (ref 1.85–7.62)
NEUTS SEG NFR BLD AUTO: 77 % (ref 43–75)
NITRITE UR QL STRIP: NEGATIVE
NON-SQ EPI CELLS URNS QL MICRO: ABNORMAL /HPF
NRBC BLD AUTO-RTO: 0 /100 WBCS
PH UR STRIP.AUTO: 6 [PH] (ref 4.5–8)
PLATELET # BLD AUTO: 221 THOUSANDS/UL (ref 149–390)
PMV BLD AUTO: 10.1 FL (ref 8.9–12.7)
POTASSIUM SERPL-SCNC: 3.7 MMOL/L (ref 3.5–5.3)
PROT SERPL-MCNC: 7.5 G/DL (ref 6.4–8.2)
PROT UR STRIP-MCNC: NEGATIVE MG/DL
RBC # BLD AUTO: 4.8 MILLION/UL (ref 3.88–5.62)
RBC #/AREA URNS AUTO: ABNORMAL /HPF
SODIUM SERPL-SCNC: 141 MMOL/L (ref 136–145)
SP GR UR STRIP.AUTO: >=1.03 (ref 1–1.03)
UROBILINOGEN UR QL STRIP.AUTO: 0.2 E.U./DL
WBC # BLD AUTO: 14.25 THOUSAND/UL (ref 4.31–10.16)
WBC #/AREA URNS AUTO: ABNORMAL /HPF

## 2019-12-21 PROCEDURE — 80053 COMPREHEN METABOLIC PANEL: CPT | Performed by: EMERGENCY MEDICINE

## 2019-12-21 PROCEDURE — 83690 ASSAY OF LIPASE: CPT | Performed by: EMERGENCY MEDICINE

## 2019-12-21 PROCEDURE — 96375 TX/PRO/DX INJ NEW DRUG ADDON: CPT

## 2019-12-21 PROCEDURE — 99284 EMERGENCY DEPT VISIT MOD MDM: CPT | Performed by: EMERGENCY MEDICINE

## 2019-12-21 PROCEDURE — 96376 TX/PRO/DX INJ SAME DRUG ADON: CPT

## 2019-12-21 PROCEDURE — 44970 LAPAROSCOPY APPENDECTOMY: CPT | Performed by: SURGERY

## 2019-12-21 PROCEDURE — 85025 COMPLETE CBC W/AUTO DIFF WBC: CPT | Performed by: EMERGENCY MEDICINE

## 2019-12-21 PROCEDURE — 96361 HYDRATE IV INFUSION ADD-ON: CPT

## 2019-12-21 PROCEDURE — 81001 URINALYSIS AUTO W/SCOPE: CPT

## 2019-12-21 PROCEDURE — 44970 LAPAROSCOPY APPENDECTOMY: CPT | Performed by: PHYSICIAN ASSISTANT

## 2019-12-21 PROCEDURE — 36415 COLL VENOUS BLD VENIPUNCTURE: CPT | Performed by: EMERGENCY MEDICINE

## 2019-12-21 PROCEDURE — 74177 CT ABD & PELVIS W/CONTRAST: CPT

## 2019-12-21 PROCEDURE — 99285 EMERGENCY DEPT VISIT HI MDM: CPT

## 2019-12-21 PROCEDURE — 99204 OFFICE O/P NEW MOD 45 MIN: CPT | Performed by: SURGERY

## 2019-12-21 PROCEDURE — 96374 THER/PROPH/DIAG INJ IV PUSH: CPT

## 2019-12-21 RX ORDER — KETOROLAC TROMETHAMINE 30 MG/ML
15 INJECTION, SOLUTION INTRAMUSCULAR; INTRAVENOUS ONCE
Status: COMPLETED | OUTPATIENT
Start: 2019-12-21 | End: 2019-12-21

## 2019-12-21 RX ORDER — MORPHINE SULFATE 4 MG/ML
4 INJECTION, SOLUTION INTRAMUSCULAR; INTRAVENOUS ONCE
Status: COMPLETED | OUTPATIENT
Start: 2019-12-21 | End: 2019-12-21

## 2019-12-21 RX ADMIN — MORPHINE SULFATE 4 MG: 4 INJECTION INTRAVENOUS at 22:28

## 2019-12-21 RX ADMIN — IOHEXOL 100 ML: 350 INJECTION, SOLUTION INTRAVENOUS at 20:16

## 2019-12-21 RX ADMIN — SODIUM CHLORIDE 1000 ML: 0.9 INJECTION, SOLUTION INTRAVENOUS at 19:23

## 2019-12-21 RX ADMIN — MORPHINE SULFATE 4 MG: 4 INJECTION INTRAVENOUS at 20:52

## 2019-12-21 RX ADMIN — MIDAZOLAM 2 MG: 1 INJECTION INTRAMUSCULAR; INTRAVENOUS at 23:57

## 2019-12-21 RX ADMIN — KETOROLAC TROMETHAMINE 15 MG: 30 INJECTION, SOLUTION INTRAMUSCULAR; INTRAVENOUS at 19:25

## 2019-12-21 RX ADMIN — SODIUM CHLORIDE: 0.9 INJECTION, SOLUTION INTRAVENOUS at 23:57

## 2019-12-21 NOTE — TELEPHONE ENCOUNTER
Natividad Rivero 1955  CONFIDENTIALTY NOTICE: This fax transmission is intended only for the addressee  It contains information that is legally privileged,  confidential or otherwise protected from use or disclosure  If you are not the intended recipient, you are strictly prohibited from reviewing,  disclosing, copying using or disseminating any of this information or taking any action in reliance on or regarding this information  If you have  received this fax in error, please notify us immediately by telephone so that we can arrange for its return to us  Page:   Call Id: [id number]  Health Call  Standard Call Report  Health Call  Patient Name: Natividad Rivero  Gender: Male  : 1955  Age: 60 Y 11 M  Return Phone  Number: [phone] (Home)  Address: [address] [place] [address]/[facility]  Practice Name: [address] [place] [address]  Practice Charged:  Physician:  Renetta Galindo Name:  Relationship To  Patient: Self  Return Phone Number: [phone] (Home)  Presenting Problem: "I have abdominal pain on my right  size "  Service Type: Triage  Charged Service 1: N/A  Pharmacy Name and  Number:  Nurse Assessment  Nurse: Hamilton Steele Date/Time: 2019 5:35:53 PM  Type of assessment required:  ---General (Adult or Child)  Duration of Current S/S  ---Occurred Friday Morning and then started again this morning  Location/Radiation  ---RLQ  Temperature (F) and route:  ---97 5 / oral  Symptom Specific Meds (Dose/Time):  ---None  Other S/S  ---Patient stated that he has a hx  of IBS  Yesterday had mild abdominal pain and 1  episode of diarrhea  After episode of diarrhea, patient did start to feel better  This  morning, the pain woke the patient up  Pain has been constant and increasing in  intensity  Patient stated that it hurts on movement and walking makes it even worse  Walks bent over  No N / V / D today    Pain Scale on scale of 1-10, 10 being the worst:  ---7 / 8 out of 10   Symptom progression:  ---worse  Abdiel Davis 1955  CONFIDENTIALTY NOTICE: This fax transmission is intended only for the addressee  It contains information that is legally privileged,  confidential or otherwise protected from use or disclosure  If you are not the intended recipient, you are strictly prohibited from reviewing,  disclosing, copying using or disseminating any of this information or taking any action in reliance on or regarding this information  If you have  received this fax in error, please notify us immediately by telephone so that we can arrange for its return to us  Page: 2 of 2  Call Id: [id number]  Nurse Assessment  Intake and Output  ---Decreased intake, "just not hungry," Voiding WNL, last BM was episode of diarrhea  yesterday morning  Last Exam/Treatment:  ---12/13/2019- Ear infection  Protocols  Protocol Title Nurse Date/Time  Abdominal Pain - Male Tara Knox 12/21/2019 6:01:30 PM  Question Caller Affirmed  Disp  Time Disposition Final User  12/21/2019 6:02:43 PM Go to ED Now (or PCP triage) Angelica Gonzalez RN, [facility]  12/21/2019 6:06:06 PM RN Triaged Yes Angelica Gonzalez RN, [facility] Advice Given Per Protocol  GO TO ED NOW (OR PCP TRIAGE): * IF NO PCP TRIAGE: You need to be seen  Go to the [facility] at _____[facility] within the next hour  Leave as soon as you can  BRING MEDICINES: * Please bring a list of your current  medicines when you go to the Emergency Department (ER)  * It is also a good idea to bring the pill bottles too  This will help the doctor  to make certain you are taking the right medicines and the right dose  DRIVING: Another adult should drive  CARE ADVICE given per  Abdominal Pain, Male (Adult) guideline    Caller Understands: Yes  Caller Disagree/Comply: Comply  PreDisposition: Unsure

## 2019-12-22 VITALS
HEART RATE: 81 BPM | SYSTOLIC BLOOD PRESSURE: 118 MMHG | TEMPERATURE: 98.8 F | BODY MASS INDEX: 26.3 KG/M2 | OXYGEN SATURATION: 97 % | WEIGHT: 196.65 LBS | RESPIRATION RATE: 18 BRPM | DIASTOLIC BLOOD PRESSURE: 72 MMHG

## 2019-12-22 PROBLEM — K35.80 ACUTE APPENDICITIS: Status: RESOLVED | Noted: 2019-12-21 | Resolved: 2019-12-22

## 2019-12-22 PROCEDURE — 88304 TISSUE EXAM BY PATHOLOGIST: CPT | Performed by: PATHOLOGY

## 2019-12-22 PROCEDURE — NC001 PR NO CHARGE: Performed by: PHYSICIAN ASSISTANT

## 2019-12-22 PROCEDURE — 99024 POSTOP FOLLOW-UP VISIT: CPT | Performed by: PHYSICIAN ASSISTANT

## 2019-12-22 RX ORDER — HYDROCODONE BITARTRATE AND ACETAMINOPHEN 5; 325 MG/1; MG/1
1 TABLET ORAL EVERY 4 HOURS PRN
Status: DISCONTINUED | OUTPATIENT
Start: 2019-12-22 | End: 2019-12-22 | Stop reason: HOSPADM

## 2019-12-22 RX ORDER — MAGNESIUM HYDROXIDE 1200 MG/15ML
LIQUID ORAL AS NEEDED
Status: DISCONTINUED | OUTPATIENT
Start: 2019-12-22 | End: 2019-12-22 | Stop reason: HOSPADM

## 2019-12-22 RX ORDER — MIDAZOLAM HYDROCHLORIDE 2 MG/2ML
INJECTION, SOLUTION INTRAMUSCULAR; INTRAVENOUS AS NEEDED
Status: DISCONTINUED | OUTPATIENT
Start: 2019-12-21 | End: 2019-12-22 | Stop reason: SURG

## 2019-12-22 RX ORDER — ONDANSETRON 2 MG/ML
4 INJECTION INTRAMUSCULAR; INTRAVENOUS ONCE AS NEEDED
Status: DISCONTINUED | OUTPATIENT
Start: 2019-12-22 | End: 2019-12-22 | Stop reason: HOSPADM

## 2019-12-22 RX ORDER — SODIUM CHLORIDE 9 MG/ML
INJECTION, SOLUTION INTRAVENOUS CONTINUOUS PRN
Status: DISCONTINUED | OUTPATIENT
Start: 2019-12-21 | End: 2019-12-22 | Stop reason: SURG

## 2019-12-22 RX ORDER — GLYCOPYRROLATE 0.2 MG/ML
INJECTION INTRAMUSCULAR; INTRAVENOUS AS NEEDED
Status: DISCONTINUED | OUTPATIENT
Start: 2019-12-22 | End: 2019-12-22 | Stop reason: SURG

## 2019-12-22 RX ORDER — ROCURONIUM BROMIDE 10 MG/ML
INJECTION, SOLUTION INTRAVENOUS AS NEEDED
Status: DISCONTINUED | OUTPATIENT
Start: 2019-12-22 | End: 2019-12-22 | Stop reason: SURG

## 2019-12-22 RX ORDER — FENTANYL CITRATE 50 UG/ML
INJECTION, SOLUTION INTRAMUSCULAR; INTRAVENOUS AS NEEDED
Status: DISCONTINUED | OUTPATIENT
Start: 2019-12-22 | End: 2019-12-22 | Stop reason: SURG

## 2019-12-22 RX ORDER — BUPIVACAINE HYDROCHLORIDE AND EPINEPHRINE 2.5; 5 MG/ML; UG/ML
INJECTION, SOLUTION EPIDURAL; INFILTRATION; INTRACAUDAL; PERINEURAL AS NEEDED
Status: DISCONTINUED | OUTPATIENT
Start: 2019-12-22 | End: 2019-12-22 | Stop reason: HOSPADM

## 2019-12-22 RX ORDER — DEXAMETHASONE SODIUM PHOSPHATE 10 MG/ML
INJECTION, SOLUTION INTRAMUSCULAR; INTRAVENOUS AS NEEDED
Status: DISCONTINUED | OUTPATIENT
Start: 2019-12-22 | End: 2019-12-22 | Stop reason: SURG

## 2019-12-22 RX ORDER — FENTANYL CITRATE/PF 50 MCG/ML
50 SYRINGE (ML) INJECTION
Status: DISCONTINUED | OUTPATIENT
Start: 2019-12-22 | End: 2019-12-22 | Stop reason: HOSPADM

## 2019-12-22 RX ORDER — PROPOFOL 10 MG/ML
INJECTION, EMULSION INTRAVENOUS AS NEEDED
Status: DISCONTINUED | OUTPATIENT
Start: 2019-12-22 | End: 2019-12-22 | Stop reason: SURG

## 2019-12-22 RX ORDER — HYDROMORPHONE HCL/PF 1 MG/ML
0.5 SYRINGE (ML) INJECTION
Status: DISCONTINUED | OUTPATIENT
Start: 2019-12-22 | End: 2019-12-22 | Stop reason: HOSPADM

## 2019-12-22 RX ORDER — HYDROCODONE BITARTRATE AND ACETAMINOPHEN 5; 325 MG/1; MG/1
1 TABLET ORAL EVERY 4 HOURS PRN
Qty: 16 TABLET | Refills: 0 | Status: SHIPPED | OUTPATIENT
Start: 2019-12-22 | End: 2020-01-01

## 2019-12-22 RX ORDER — CEFAZOLIN SODIUM 1 G/3ML
INJECTION, POWDER, FOR SOLUTION INTRAMUSCULAR; INTRAVENOUS AS NEEDED
Status: DISCONTINUED | OUTPATIENT
Start: 2019-12-22 | End: 2019-12-22 | Stop reason: SURG

## 2019-12-22 RX ORDER — HYDROCODONE BITARTRATE AND ACETAMINOPHEN 5; 325 MG/1; MG/1
2 TABLET ORAL EVERY 6 HOURS PRN
Status: DISCONTINUED | OUTPATIENT
Start: 2019-12-22 | End: 2019-12-22 | Stop reason: HOSPADM

## 2019-12-22 RX ORDER — SODIUM CHLORIDE 9 MG/ML
125 INJECTION, SOLUTION INTRAVENOUS CONTINUOUS
Status: DISCONTINUED | OUTPATIENT
Start: 2019-12-22 | End: 2019-12-22 | Stop reason: HOSPADM

## 2019-12-22 RX ORDER — KETOROLAC TROMETHAMINE 30 MG/ML
INJECTION, SOLUTION INTRAMUSCULAR; INTRAVENOUS AS NEEDED
Status: DISCONTINUED | OUTPATIENT
Start: 2019-12-22 | End: 2019-12-22 | Stop reason: SURG

## 2019-12-22 RX ORDER — LIDOCAINE HYDROCHLORIDE 10 MG/ML
INJECTION, SOLUTION EPIDURAL; INFILTRATION; INTRACAUDAL; PERINEURAL AS NEEDED
Status: DISCONTINUED | OUTPATIENT
Start: 2019-12-22 | End: 2019-12-22 | Stop reason: SURG

## 2019-12-22 RX ORDER — MEPERIDINE HYDROCHLORIDE 50 MG/ML
12.5 INJECTION INTRAMUSCULAR; INTRAVENOUS; SUBCUTANEOUS ONCE AS NEEDED
Status: DISCONTINUED | OUTPATIENT
Start: 2019-12-22 | End: 2019-12-22 | Stop reason: HOSPADM

## 2019-12-22 RX ORDER — NEOSTIGMINE METHYLSULFATE 1 MG/ML
INJECTION INTRAVENOUS AS NEEDED
Status: DISCONTINUED | OUTPATIENT
Start: 2019-12-22 | End: 2019-12-22 | Stop reason: SURG

## 2019-12-22 RX ORDER — ONDANSETRON 2 MG/ML
INJECTION INTRAMUSCULAR; INTRAVENOUS AS NEEDED
Status: DISCONTINUED | OUTPATIENT
Start: 2019-12-22 | End: 2019-12-22 | Stop reason: SURG

## 2019-12-22 RX ADMIN — SODIUM CHLORIDE 125 ML/HR: 0.9 INJECTION, SOLUTION INTRAVENOUS at 02:14

## 2019-12-22 RX ADMIN — LIDOCAINE HYDROCHLORIDE 100 MG: 10 INJECTION, SOLUTION EPIDURAL; INFILTRATION; INTRACAUDAL; PERINEURAL at 00:04

## 2019-12-22 RX ADMIN — CEFAZOLIN SODIUM 2000 MG: 1 INJECTION, POWDER, FOR SOLUTION INTRAMUSCULAR; INTRAVENOUS at 00:21

## 2019-12-22 RX ADMIN — ONDANSETRON 4 MG: 2 INJECTION INTRAMUSCULAR; INTRAVENOUS at 00:04

## 2019-12-22 RX ADMIN — ROCURONIUM BROMIDE 10 MG: 50 INJECTION, SOLUTION INTRAVENOUS at 00:39

## 2019-12-22 RX ADMIN — DEXAMETHASONE SODIUM PHOSPHATE 8 MG: 10 INJECTION, SOLUTION INTRAMUSCULAR; INTRAVENOUS at 00:04

## 2019-12-22 RX ADMIN — FENTANYL CITRATE 50 MCG: 50 INJECTION, SOLUTION INTRAMUSCULAR; INTRAVENOUS at 00:42

## 2019-12-22 RX ADMIN — KETOROLAC TROMETHAMINE 30 MG: 30 INJECTION, SOLUTION INTRAMUSCULAR at 01:00

## 2019-12-22 RX ADMIN — NEOSTIGMINE METHYLSULFATE 3 MG: 1 INJECTION, SOLUTION INTRAVENOUS at 01:02

## 2019-12-22 RX ADMIN — GLYCOPYRROLATE 0.4 MG: 0.2 INJECTION INTRAMUSCULAR; INTRAVENOUS at 01:02

## 2019-12-22 RX ADMIN — FENTANYL CITRATE 100 MCG: 50 INJECTION, SOLUTION INTRAMUSCULAR; INTRAVENOUS at 00:04

## 2019-12-22 RX ADMIN — PROPOFOL 200 MG: 10 INJECTION, EMULSION INTRAVENOUS at 00:04

## 2019-12-22 RX ADMIN — SODIUM CHLORIDE: 0.9 INJECTION, SOLUTION INTRAVENOUS at 00:28

## 2019-12-22 RX ADMIN — ROCURONIUM BROMIDE 30 MG: 50 INJECTION, SOLUTION INTRAVENOUS at 00:04

## 2019-12-22 RX ADMIN — HYDROCODONE BITARTRATE AND ACETAMINOPHEN 1 TABLET: 5; 325 TABLET ORAL at 05:53

## 2019-12-22 RX ADMIN — Medication 500 MG: at 00:17

## 2019-12-22 NOTE — PLAN OF CARE
Problem: Potential for Falls  Goal: Patient will remain free of falls  Description  INTERVENTIONS:  - Assess patient frequently for physical needs  -  Identify cognitive and physical deficits and behaviors that affect risk of falls    -  [place] fall precautions as indicated by assessment   - Educate patient/family on patient safety including physical limitations  - Instruct patient to call for assistance with activity based on assessment  - Modify environment to reduce risk of injury  - Consider OT/PT consult to assist with strengthening/mobility  Outcome: Progressing     Problem: PAIN - ADULT  Goal: Verbalizes/displays adequate comfort level or baseline comfort level  Description  Interventions:  - Encourage patient to monitor pain and request assistance  - Assess pain using appropriate pain scale  - Administer analgesics based on type and severity of pain and evaluate response  - Implement non-pharmacological measures as appropriate and evaluate response  - Consider cultural and social influences on pain and pain management  - Notify physician/advanced practitioner if interventions unsuccessful or patient reports new pain  Outcome: Progressing     Problem: INFECTION - ADULT  Goal: Absence or prevention of progression during hospitalization  Description  INTERVENTIONS:  - Assess and monitor for signs and symptoms of infection  - Monitor lab/diagnostic results  - Monitor all insertion sites, i e  indwelling lines, tubes, and drains  - Monitor endotracheal if appropriate and nasal secretions for changes in amount and color  - [place] appropriate cooling/warming therapies per order  - Administer medications as ordered  - Instruct and encourage patient and family to use good hand hygiene technique  - Identify and instruct in appropriate isolation precautions for identified infection/condition  Outcome: Progressing  Goal: Absence of fever/infection during neutropenic period  Description  INTERVENTIONS:  - Monitor WBC    Outcome: Progressing     Problem: SAFETY ADULT  Goal: Maintain or return to baseline ADL function  Description  INTERVENTIONS:  -  Assess patient's ability to carry out ADLs; assess patient's baseline for ADL function and identify physical deficits which impact ability to perform ADLs (bathing, care of mouth/teeth, toileting, grooming, dressing, etc )  - Assess/evaluate cause of self-care deficits   - Assess range of motion  - Assess patient's mobility; develop plan if impaired  - Assess patient's need for assistive devices and provide as appropriate  - Encourage maximum independence but intervene and supervise when necessary  - Involve family in performance of ADLs  - Assess for home care needs following discharge   - Consider OT consult to assist with ADL evaluation and planning for discharge  - Provide patient education as appropriate  Outcome: Progressing  Goal: Maintain or return mobility status to optimal level  Description  INTERVENTIONS:  - Assess patient's baseline mobility status (ambulation, transfers, stairs, etc )    - Identify cognitive and physical deficits and behaviors that affect mobility  - Identify mobility aids required to assist with transfers and/or ambulation (gait belt, sit-to-stand, lift, walker, cane, etc )  - [place] fall precautions as indicated by assessment  - Record patient progress and toleration of activity level on Mobility SBAR; progress patient to next Phase/Stage  - Instruct patient to call for assistance with activity based on assessment  - Consider rehabilitation consult to assist with strengthening/weightbearing, etc   Outcome: Progressing     Problem: DISCHARGE PLANNING  Goal: Discharge to home or other facility with appropriate resources  Description  INTERVENTIONS:  - Identify barriers to discharge w/patient and caregiver  - Arrange for needed discharge resources and transportation as appropriate  - Identify discharge learning needs (meds, wound care, etc )  - Arrange for interpretive services to assist at discharge as needed  - Refer to Case Management Department for coordinating discharge planning if the patient needs post-hospital services based on physician/advanced practitioner order or complex needs related to functional status, cognitive ability, or social support system  Outcome: Progressing     Problem: Knowledge Deficit  Goal: Patient/family/caregiver demonstrates understanding of disease process, treatment plan, medications, and discharge instructions  Description  Complete learning assessment and assess knowledge base    Interventions:  - Provide teaching at level of understanding  - Provide teaching via preferred learning methods  Outcome: Progressing

## 2019-12-22 NOTE — OP NOTE
OPERATIVE REPORT  PATIENT NAME: Kristina Tee    :  1955  MRN: [id number]  Pt Location: [facility] OR ROOM 07    SURGERY DATE: 2019    Surgeon(s) and Role:     * Carito Lomas MD - Primary     * Telma Muñoz PA-C - Assisting    Preop Diagnosis:  Acute appendicitis [K35 80]    Post-Op Diagnosis Codes:     * Acute appendicitis [K35 80]    Procedure(s) (LRB):  APPENDECTOMY LAPAROSCOPIC (N/A)    Specimen(s):  ID Type Source Tests Collected by Time Destination   1 :  Tissue Appendix TISSUE EXAM Carito Lomas MD 2019 [id number]        Estimated Blood Loss:   Minimal    Drains:  Closed/Suction Drain Right Abdomen Bulb 10 Fr  (Active)   Number of days: 419       Urethral Catheter Latex 20 Fr  (Active)   Number of days: 419       Urethral Catheter Latex 16 Fr  (Active)   Number of days: 0       Anesthesia Type:   General    Operative Indications:  Acute appendicitis [K35 80]      Operative Findings:  Retrocolic appendix pointing to the gallbladder fossa    Complications:   None    Procedure and Technique:  The patient was seen again in the Holding Room  The risks, benefits, complications, treatment options, and expected outcomes were discussed with the patient and/or family  The possibilities of reaction to medication, pulmonary aspiration, perforation of viscus, bleeding, recurrent infection, finding a normal appendix, the need for additional procedures, failure to diagnose a condition, and creating a complication requiring transfusion or operation were discussed  There was concurrence with the proposed plan and informed consent was obtained  The site of surgery was properly noted/marked  The patient was taken to Operating Room, identified as Kristina Tee and the procedure verified as Appendectomy  A Time Out was held after the prep and draping,  and the above information confirmed      The patient was placed in the supine position and general anesthesia was induced, along with placement of orogastric tube, Venodyne boots, and a Parra catheter  The abdomen was prepped and draped in a sterile fashion  An infraumbilical incision was made and an open technique was used to enter the abdomen  An 12mm port was placed and a pneumoperitoneum created  Additional ports were placed under direct vision in the routine positions  A careful evaluation of the entire abdomen was carried out  The patient was placed in Trendelenburg and left lateral decubitus position  The small intestines were retracted in the cephalad and left lateral direction away from the pelvis and right lower quadrant  Appendix identified and it was retrocolic and lateral extending towards the gallbladder fossa  As it was so far lateral and the tip was stuck at the hepatic flexure a 4th trocars placed in the upper midline in order to visualize the appendix better  The appendix was carefully dissected  The mesentery was dissected with the harmonic scalpel  A endo-EVELIO stapler with a blue load was fire at the base of the appendix at the level of the cecum  There was no evidence of bleeding, leakage, or complication after division of the appendix and mesoappendix  Irrigation was also performed and irrigate suctioned from the abdomen as well  The umbilical port site fascia was closed using a 0-Vicryl figure of eight using the renfac suture passer  All skin incisions were closed using MonocryI suture in a subcuticular fashion  The instrument, sponge, and needle counts were correct at the conclusion of the case  Physician Assistant medically necessary for surgical safety of case and for suturing, retraction, and hemostasis        I was present for the entire procedure and A qualified resident physician was not available    Patient Disposition:  PACU     SIGNATURE: Bhavna Richey MD  DATE: December 22, 2019  TIME: 1:02 AM

## 2019-12-22 NOTE — ED NOTES
Patient transported to [facility][note 3], [address] [place] [address]  12/21/19 2013

## 2019-12-22 NOTE — H&P
H&P Exam - General Surgery   Sammy Leo 60 y o  male MRN: [id number]  Unit/Bed#: ED 24 Encounter: [id number]    Assessment/Plan     Assessment:Plan:  Acute appendicitis  No evidence of perforation on CT scan  Plan for laparoscopic appendectomy  Risks benefits alternatives explained the patient is agreeable to proceed    History of Present Illness     HPI:  Sammy Leo is a 60 y o  male who presents with abdominal pain  Patient states he had some mild pain yesterday thought it was more gas related  This morning woke up and the pain was more severe buttock any that is more gas related  He did go skiing in the morning but because the pain was increased in severity sick in the ER for evaluation  Patient denies fevers or chills  Denies nausea vomiting  Denies any pain this the past     Review of Systems   Constitutional: Positive for appetite change  Negative for chills, fever and unexpected weight change  HENT: Negative for congestion, sore throat and trouble swallowing  Eyes: Negative for discharge and itching  Respiratory: Negative for cough, shortness of breath and wheezing  Cardiovascular: Negative for chest pain and leg swelling  Gastrointestinal: Positive for abdominal pain  Negative for nausea and vomiting  Endocrine: Negative for cold intolerance and heat intolerance  Genitourinary: Negative for difficulty urinating, dysuria and frequency  Musculoskeletal: Negative for arthralgias, gait problem and joint swelling  Skin: Negative for color change and wound  Neurological: Negative for dizziness, numbness and headaches  Psychiatric/Behavioral: Negative for agitation and confusion  The patient is not nervous/anxious          Historical Information   Past Medical History:   Diagnosis Date    Abnormal cardiovascular stress test     last assessed-11/13/2015    Abnormal electrocardiogram     last assessed-11/2/2015    Arthritis     great toes    Benign essential hypertension last assessed-2017    Cancer [facility])     prostate    Elevated PSA     Elevated PSA     Encounter for screening colonoscopy for non-high-risk patient     family hx possible of colon ca    Hallux rigidus     last assessed-2014    Hyperlipidemia     Irregular heart beat     "rapid heart beat episode X1" - tested - no issue    Organic impotence     last assessed-2014    Rapid pulse     resolved-2016    Skin lesion     last assessed-2017    Tachycardia     last assessed-2015     Past Surgical History:   Procedure Laterality Date    BACK SURGERY      L4-L5    COLONOSCOPY      X 3-4     OTHER SURGICAL HISTORY      spinal diskectomy    WY COLONOSCOPY FLX DX W/COLLJ SPEC WHEN PFRMD N/A 2016    Procedure: COLONOSCOPY;  Surgeon: Ji Wesley MD;  Location: [facility];   Service: Colorectal    W[place] LAP,PROSTATECTOMY,RADICAL,W/NERVE SPARE,INCL ROBOTIC N/A 10/29/2018    Procedure: PROSTATECTOMY RADICAL W ROBOTICS; BILATERAL PELVIC LYMPH NODE DISSECTION; LYSIS OF ADHESIONS;  Surgeon: Emanuel Guzmán MD;  Location: [facility];  Service: Urology    PROSTATE BIOPSY Bilateral 2018    REDUCTION OF TORSION OF TESTIS  2001    VASECTOMY       Social History   Social History     Substance and Sexual Activity   Alcohol Use Yes    Alcohol/week: 21 0 - 28 0 standard drinks    Types: 21 - 28 Standard drinks or equivalent per week    Comment: Moderate drinker; 3-4 drinks daily     Social History     Substance and Sexual Activity   Drug Use No     Social History     Tobacco Use   Smoking Status Former Smoker    Packs/day: 0 50    Years: 9 00    Pack years: 4 50    Last attempt to quit: 1984    Years since quittin 9   Smokeless Tobacco Never Used     Family History: non-contributory    Meds/Allergies   all medications and allergies reviewed  Allergies   Allergen Reactions    Lactose GI Intolerance       Objective   First Vitals:   Blood Pressure: 163/85 (19 1826)  Pulse: 99 (12/21/19 1826)  Temperature: 98 6 °F (37 °C) (12/21/19 1826)  Temp Source: Temporal (12/21/19 1826)  Respirations: 16 (12/21/19 1826)  SpO2: 99 % (12/21/19 1826)    Current Vitals:   Blood Pressure: 131/65 (12/21/19 2245)  Pulse: 92 (12/21/19 2245)  Temperature: 98 6 °F (37 °C) (12/21/19 1826)  Temp Source: Temporal (12/21/19 1826)  Respirations: 16 (12/21/19 2245)  SpO2: 96 % (12/21/19 2245)    No intake or output data in the 24 hours ending 12/21/19 2335    Invasive Devices     Peripheral Intravenous Line            Peripheral IV 10/29/18 Left Arm 418 days    Peripheral IV 10/29/18 Right Hand 418 days    Peripheral IV 12/21/19 Right Antecubital less than 1 day          Drain            Closed/Suction Drain Right Abdomen Bulb 10 Fr  418 days    Urethral Catheter Latex 20 Fr  418 days                Physical Exam   Constitutional: He is oriented to person, place, and time  He appears well-developed and well-nourished  HENT:   Head: Normocephalic and atraumatic  Eyes: Pupils are equal, round, and reactive to light  EOM are normal    Neck: Normal range of motion  Neck supple  Cardiovascular: Normal rate and regular rhythm  Pulmonary/Chest: Effort normal and breath sounds normal    Abdominal: Soft  He exhibits no distension  There is tenderness  There is rebound and guarding  Musculoskeletal: Normal range of motion  Neurological: He is alert and oriented to person, place, and time  Skin: Skin is warm and dry  Psychiatric: He has a normal mood and affect  His behavior is normal        Lab Results: I have personally reviewed pertinent lab results  Imaging: I have personally reviewed pertinent films in PACS  EKG, Pathology, and Other Studies: I have personally reviewed pertinent reports

## 2019-12-22 NOTE — ANESTHESIA POSTPROCEDURE EVALUATION
Post-Op Assessment Note    CV Status:  Stable    Pain management: adequate     Mental Status:  Alert and awake   Hydration Status:  Euvolemic   PONV Controlled:  Controlled   Airway Patency:  Patent   Post Op Vitals Reviewed: Yes      Staff: Anesthesiologist           /78 (12/22/19 0240)    Temp      Pulse 97 (12/22/19 0240)   Resp 18 (12/22/19 0240)    SpO2 98 % (12/22/19 0240)

## 2019-12-22 NOTE — DISCHARGE INSTRUCTIONS
[facility]  Post-Operative Care Instructions  Dr Magno Higgins MD, [place] [address]  [phone]    1  General: You will feel pulling sensations around the wound or funny aches and pains around the incisions  This is normal  Even minor surgery is a change in your body and this is your bodys way of reaction to it  If you have had abdominal surgery, it may help to support the incision with a small pillow or blanket for comfort when moving or coughing  2  Wound care:  Okay to shower  The glue will fall off over the next week or 2     3  Water: You may shower over the wound, unless there are drain tubes left in place  Do not bathe or use a pool or hot tub until cleared by the physician  4  Activity: You may go up and down stairs, walk as much as you are comfortable, but walk at least 3 times each day  If you have had abdominal surgery, do not lift anything heavier than 20 pounds for at least 4 weeks, unless cleared by the doctor  5  Diet: You may resume a regular diet  If you had a same-day surgery or overnight stay surgery, he may wish to eat lightly for a few days: soups, crackers, and sandwiches  You may resume a regular diet when ready  6  Medications: Resume all of your previous medications, unless told otherwise by the doctor  A good option for pain control is to start with Tylenol and ibuprofen and alternate taking them every 2 hours for 1-2 days  If this is not sufficient then substituted the Tylenol for the narcotic pain medicine prescribed  Insure that you do not take more than 4000 mg of Tylenol per day  You do not need to take the narcotic pain medications unless you are having significant pain and discomfort  7  Driving: You will need someone to drive you home on the day of surgery  Do not drive or make any important decisions while on narcotic pain medication or 24 hours and after anesthesia or sedation for surgery   Generally, you may drive when your off all narcotic pain medications  8  Upset Stomach: You may take Maalox, Tums, or similar items for an upset stomach  If your narcotic pain medication causes an upset stomach, do not take it on an empty stomach  Try taking it with at least some crackers or toast      9  Constipation: Patients often experienced constipation after surgery  You may take over-the-counter medication for this, such as Metamucil, Senokot, Dulcolax, milk of magnesia, etc  You may take a suppository unless you have had anorectal surgery such as a procedure on your hemorrhoids  If you experience significant nausea or vomiting after abdominal surgery, call the office before trying any of these medications  10  Call the office: If you are experiencing any of the following, fevers above 101 5°, significant nausea or vomiting, if the wound develops drainage and/or is excessive redness around the wound, or if you have significant diarrhea or other worsening symptoms  11  Pain: You may be given a prescription for pain  This will be given to the hospital, the day of surgery  12  Sexual Activity: You may resume sexual activity when you feel ready and comfortable and your incision is sealed and healed without apparent infection risk

## 2019-12-22 NOTE — PROGRESS NOTES
Progress Note - General Surgery  Kulwinder Tamty 60 y o  male MRN: [id number]  Unit/Bed#: E2 -01 Encounter: [id number]    Assessment/Plan:  Acute appendicitis  POD# 1 Status post laparoscopic appendectomy  Doing well postoperatively pain well controlled  Tolerating clear liquid diet without nausea vomiting, advanced surgical soft  Discharged home today when tolerating diet, ambulating without difficulty, and pain well controlled with oral medications      Subjective/Objective   Chief Complaint:  [place] to go home    Subjective: Reports he feels good, some abdominal soreness otherwise pain is controlled  He has not been out of bed at this point and has only had clear liquids  Objective:     Blood pressure 113/67, pulse 77, temperature 98 7 °F (37 1 °C), temperature source Tympanic, resp  rate 18, weight 89 2 kg (196 lb 10 4 oz), SpO2 93 %  ,Body mass index is 26 3 kg/m²  Intake/Output Summary (Last 24 hours) at 12/22/2019 0701  Last data filed at 12/22/2019 0107  Gross per 24 hour   Intake 1700 ml   Output 125 ml   Net 1575 ml       Invasive Devices     Peripheral Intravenous Line            Peripheral IV 10/29/18 Left Arm 419 days    Peripheral IV 10/29/18 Right Hand 419 days    Peripheral IV 12/21/19 Right Antecubital less than 1 day          Drain            Closed/Suction Drain Right Abdomen Bulb 10 Fr  418 days    Urethral Catheter Latex 20 Fr  418 days                Physical Exam: /67 (BP Location: Left arm)   Pulse 77   Temp 98 7 °F (37 1 °C) (Tympanic)   Resp 18   Wt 89 2 kg (196 lb 10 4 oz)   SpO2 93%   BMI 26 30 kg/m²   General appearance: alert and oriented, in no acute distress and laying comfortably in bed  Abdomen: soft, incisional tenderness, incisions CDI with adhesive intact   Non-distended    Lab, Imaging and other studies:  CBC:   Lab Results   Component Value Date    WBC 14 25 (H) 12/21/2019    HGB 14 7 12/21/2019    HCT 45 6 12/21/2019    MCV 95 12/21/2019     12/21/2019    MCH 30 6 12/21/2019    MCHC 32 2 12/21/2019    RDW 13 2 12/21/2019    MPV 10 1 12/21/2019    NRBC 0 12/21/2019   , CMP:   Lab Results   Component Value Date    SODIUM 141 12/21/2019    K 3 7 12/21/2019     12/21/2019    CO2 30 12/21/2019    BUN 23 12/21/2019    CREATININE 0 99 12/21/2019    CALCIUM 9 2 12/21/2019    AST 26 12/21/2019    ALT 64 12/21/2019    ALKPHOS 80 12/21/2019    EGFR 81 12/21/2019     VTE Pharmacologic Prophylaxis: Heparin  VTE Mechanical Prophylaxis: sequential compression device

## 2019-12-22 NOTE — ANESTHESIA PREPROCEDURE EVALUATION
Review of Systems/Medical History  Patient summary reviewed  Chart reviewed      Cardiovascular  Hyperlipidemia, Hypertension controlled,   Comment: Borderline HTN  No meds ,  Pulmonary  Smoker ex-smoker  ,        GI/Hepatic      Comment: Acute appendicitis     Prostatic disorder, benign prostatic hyperplasia and history of prostate cancer       Endo/Other  Negative endo/other ROS      GYN  Negative gynecology ROS          Hematology  Negative hematology ROS      Musculoskeletal    Arthritis     Neurology  Negative neurology ROS      Psychology   Negative psychology ROS              Physical Exam    Airway    Mallampati score: II  TM Distance: <3 FB  Neck ROM: full     Dental   No notable dental hx     Cardiovascular  Rhythm: regular, Rate: normal, Cardiovascular exam normal    Pulmonary  Pulmonary exam normal Breath sounds clear to auscultation,     Other Findings        Anesthesia Plan  ASA Score- 2 Emergent    Anesthesia Type- general with ASA Monitors  Additional Monitors:   Airway Plan: ETT  Plan Factors-    Induction- intravenous  Postoperative Plan-     Informed Consent- Anesthetic plan and risks discussed with patient

## 2019-12-22 NOTE — ED ATTENDING ATTESTATION
12/21/2019  I, Jemma Eubanks MD, saw and evaluated the patient  I have discussed the patient with the resident/non-physician practitioner and agree with the resident's/non-physician practitioner's findings, Plan of Care, and MDM as documented in the resident's/non-physician practitioner's note, except where noted  All available labs and Radiology studies were reviewed  I was present for key portions of any procedure(s) performed by the resident/non-physician practitioner and I was immediately available to provide assistance  At this point I agree with the current assessment done in the Emergency Department  I have conducted an independent evaluation of this patient a history and physical is as follows:  59 yo male c/o abdominal pain localizing to RLQ onset this morning, increasing since onset, no fever, no N/V/D  Denies urinary symptoms  No prior abdominal surgery, he has had prostatectomy  VS normal   NAD  Abd soft, but with pain localizing to RLQ  ED workup with labs, and CT imaging, with concern for appendicitis, less likely obstruction, less likely renal colic, or diverticulitis  Treat symptoms and reassess  Disposition according to findings and response to treatment  ED Course  ED Course as of Dec 22 0102   Sat Dec 21, 2019   2130 Radiologist called about acute appendicitis  Page out to surgery  2216 I am awaiting call back from Dr Jillian Meza through, , pager, and [facility] text myself   26 Dr Jillian Meza called let me know that his [employer] account was supposed to be forwarding calls to the OR at [facility] while he was operating    He agrees to do surgery, and asked to hold patient in ED while he checks if there is OR available--if not we will bridge him            Critical Care Time  Procedures

## 2019-12-23 ENCOUNTER — TELEPHONE (OUTPATIENT)
Dept: SURGERY | Facility: HOSPITAL | Age: 64
End: 2019-12-23

## 2019-12-23 NOTE — ED PROVIDER NOTES
History  Chief Complaint   Patient presents with    Abdominal Pain     Reports RLQ pain since this morning at 0800  Worse the past two hours  Denies nausea/vomiting/diarrhea  Last BM was yesterday morning at 0500, diarrhea  Patient is a 57-year-old male that presents with right lower quadrant abdominal pain  Patient says he had the gradual onset of right lower quadrant abdominal pain this morning  Pain is described as dull/achy and constant  The pain has been worsening over the past several hours  He denies any associated nausea, vomiting  Denies any diarrhea, melena hematochezia  Denies any hematuria or dysuria  No history of abdominal surgery in the past   He has not taken anything for the pain  He denies any chest pain or dyspnea  He does endorse decreased p o  Intake secondary to lack of appetite but has been able to stay hydrated  Prior to Admission Medications   Prescriptions Last Dose Informant Patient Reported?  Taking?   amoxicillin-clavulanate (AUGMENTIN) 875-125 mg per tablet Not Taking at Unknown time  No No   Sig: Take 1 tablet by mouth every 12 (twelve) hours for 10 days   Patient not taking: Reported on 12/21/2019   aspirin (ECOTRIN LOW STRENGTH) 81 mg EC tablet Not Taking at Unknown time Self Yes No   Sig: Take 81 mg by mouth daily   ezetimibe-simvastatin (VYTORIN) 10-10 mg per tablet   No Yes   Sig: TAKE 1 TABLET DAILY      Facility-Administered Medications: None       Past Medical History:   Diagnosis Date    Abnormal cardiovascular stress test     last assessed-11/13/2015    Abnormal electrocardiogram     last assessed-11/2/2015    Arthritis     great toes    Benign essential hypertension     last assessed-5/1/2017    Cancer [facility])     prostate    Elevated PSA     Elevated PSA     Encounter for screening colonoscopy for non-high-risk patient     family hx possible of colon ca    Hallux rigidus     last assessed-4/24/2014    Hyperlipidemia     Irregular heart beat "rapid heart beat episode X1" - tested - no issue    Organic impotence     last assessed-2014    Rapid pulse     resolved-2016    Skin lesion     last assessed-2017    Tachycardia     last assessed-2015       Past Surgical History:   Procedure Laterality Date    BACK SURGERY      L4-L5    COLONOSCOPY      X 3-4     OTHER SURGICAL HISTORY      spinal diskectomy    NV COLONOSCOPY FLX DX W/COLLJ SPEC WHEN PFRMD N/A 2016    Procedure: COLONOSCOPY;  Surgeon: Jose Roberto Gonzalez MD;  [facility]: [facility]; Service: Colorectal    NV LAP,APPENDECTOMY N/A 2019    Procedure: APPENDECTOMY LAPAROSCOPIC;  Surgeon: Philis Gottron, MD;  [facility];  Service: General    NV LAP,PROSTATECTOMY,RADICAL,W/NERVE SPARE,INCL ROBOTIC N/A 10/29/2018    Procedure: PROSTATECTOMY RADICAL W ROBOTICS; BILATERAL PELVIC LYMPH NODE DISSECTION; LYSIS OF ADHESIONS;  Surgeon: Al Young MD;  [facility];  Service: Urology    PROSTATE BIOPSY Bilateral 2018    REDUCTION OF TORSION OF TESTIS  2001    VASECTOMY         Family History   Problem Relation Age of Onset    Cancer Mother     Hepatitis Father         Hepatitis C    Dementia Father      I have reviewed and agree with the history as documented  Social History     Tobacco Use    Smoking status: Former Smoker     Packs/day: 0 50     Years: 9 00     Pack years: 4 50     Last attempt to quit: 1984     Years since quittin 0    Smokeless tobacco: Never Used   Substance Use Topics    Alcohol use: Yes     Alcohol/week: 21 0 - 28 0 standard drinks     Types: 21 - 28 Standard drinks or equivalent per week     Comment: Moderate drinker; 3-4 drinks daily    Drug use: No        Review of Systems   Constitutional: Negative for chills, diaphoresis, fatigue and fever  HENT: Negative for drooling, facial swelling, sore throat and trouble swallowing  Eyes: Negative for photophobia     Respiratory: Negative for cough, choking, chest tightness, shortness of breath, wheezing and stridor  Cardiovascular: Negative for chest pain, palpitations and leg swelling  Gastrointestinal: Positive for abdominal pain  Negative for abdominal distention, diarrhea, nausea and vomiting  Genitourinary: Negative for dysuria  Musculoskeletal: Negative for back pain, neck pain and neck stiffness  Skin: Negative for color change, pallor and rash  Neurological: Negative for dizziness, speech difficulty, weakness, light-headedness, numbness and headaches  Hematological: Negative for adenopathy  Psychiatric/Behavioral: Negative for agitation  All other systems reviewed and are negative  Physical Exam  ED Triage Vitals [12/21/19 1826]   Temperature Pulse Respirations Blood Pressure SpO2   98 6 °F (37 °C) 99 16 163/85 99 %      Temp Source Heart Rate Source Patient Position - Orthostatic VS BP Location FiO2 (%)   Temporal Monitor Sitting Right arm --      Pain Score       8             Orthostatic Vital Signs  Vitals:    12/22/19 0500 12/22/19 0600 12/22/19 0700 12/22/19 0728   BP: 117/71 113/67 112/64 118/72   Pulse: 77 77 69 81   Patient Position - Orthostatic VS: Lying Lying Lying Lying       Physical Exam   Constitutional: He is oriented to person, place, and time  He appears well-developed and well-nourished  No distress  HENT:   Head: Normocephalic  Eyes: Pupils are equal, round, and reactive to light  EOM are normal    Neck: Normal range of motion  Neck supple  Cardiovascular: Normal rate, regular rhythm, normal heart sounds and intact distal pulses  Exam reveals no gallop and no friction rub  No murmur heard  Pulmonary/Chest: Effort normal and breath sounds normal    Abdominal: Soft  Bowel sounds are normal  He exhibits no distension  There is tenderness  There is guarding and tenderness at McBurney's point  Patient with significant tenderness the right lower quadrant at McBurney's point  Guarding with deep palpation in this area  No rebound tenderness   Musculoskeletal: Normal range of motion  No CVA tenderness   Neurological: He is alert and oriented to person, place, and time  No cranial nerve deficit or sensory deficit  He exhibits normal muscle tone  Skin: Capillary refill takes less than 2 seconds  Psychiatric: He has a normal mood and affect  His behavior is normal  Judgment and thought content normal    Vitals reviewed        ED Medications  Medications   ketorolac (TORADOL) injection 15 mg (15 mg Intravenous Given 12/21/19 1925)   sodium chloride 0 9 % bolus 1,000 mL (1,000 mL Intravenous Continued from OR 12/22/19 0117)   iohexol (OMNIPAQUE) 350 MG/ML injection (MULTI-DOSE) 100 mL (100 mL Intravenous Given 12/21/19 2016)   morphine (PF) 4 mg/mL injection 4 mg (4 mg Intravenous Given 12/21/19 2052)   morphine (PF) 4 mg/mL injection 4 mg (4 mg Intravenous Given 12/21/19 2228)       Diagnostic Studies  Results Reviewed     Procedure Component Value Units Date/Time    Urine Microscopic [[id number]]  (Abnormal) Collected:  12/21/19 2033    Lab Status:  Final result Specimen:  Urine, Clean Catch Updated:  12/21/19 2144     RBC, UA 1-2 /hpf      WBC, UA 1-2 /hpf      Epithelial Cells Occasional /hpf      Bacteria, UA Occasional /hpf      MUCUS THREADS Occasional    POCT urinalysis dipstick [[id number]]  (Abnormal) Resulted:  12/21/19 2052    Lab Status:  Final result Updated:  12/21/19 2052    Urine Macroscopic, POC [[id number]]  (Abnormal) Collected:  12/21/19 2033    Lab Status:  Final result Specimen:  Urine Updated:  12/21/19 2035     Color, UA Yellow     Clarity, UA Clear     pH, UA 6 0     Leukocytes, UA Negative     Nitrite, UA Negative     Protein, UA Negative mg/dl      Glucose, UA Negative mg/dl      Ketones, UA 15 (1+) mg/dl      Urobilinogen, UA 0 2 E U /dl      Bilirubin, UA Negative     Blood, UA Trace     Specific Gravity, UA >=1 030    Narrative:       CLINITEK RESULT    Comprehensive metabolic panel [[id number]] Collected:  12/21/19 1927    Lab Status:  Final result Specimen:  Blood from Arm, Right Updated:  12/21/19 1955     Sodium 141 mmol/L      Potassium 3 7 mmol/L      Chloride 102 mmol/L      CO2 30 mmol/L      ANION GAP 9 mmol/L      BUN 23 mg/dL      Creatinine 0 99 mg/dL      Glucose 100 mg/dL      Calcium 9 2 mg/dL      AST 26 U/L      ALT 64 U/L      Alkaline Phosphatase 80 U/L      Total Protein 7 5 g/dL      Albumin 3 9 g/dL      Total Bilirubin 0 88 mg/dL      eGFR 81 ml/min/1 73sq m     Narrative:       [facility] guidelines for Chronic Kidney Disease (CKD):     Stage 1 with normal or high GFR (GFR > 90 mL/min/1 73 square meters)    Stage 2 Mild CKD (GFR = 60-89 mL/min/1 73 square meters)    Stage 3A Moderate CKD (GFR = 45-59 mL/min/1 73 square meters)    Stage 3B Moderate CKD (GFR = 30-44 mL/min/1 73 square meters)    Stage 4 Severe CKD (GFR = 15-29 mL/min/1 73 square meters)    Stage 5 End Stage CKD (GFR <15 mL/min/1 73 square meters)  Note: GFR calculation is accurate only with a steady state creatinine    Lipase [[id number]]  (Normal) Collected:  12/21/19 1927    Lab Status:  Final result Specimen:  Blood from Arm, Right Updated:  12/21/19 1955     Lipase 112 u/L     CBC and differential [[id number]]  (Abnormal) Collected:  12/21/19 1927    Lab Status:  Final result Specimen:  Blood from Arm, Right Updated:  12/21/19 1939     WBC 14 25 Thousand/uL      RBC 4 80 Million/uL      Hemoglobin 14 7 g/dL      Hematocrit 45 6 %      MCV 95 fL      MCH 30 6 pg      MCHC 32 2 g/dL      RDW 13 2 %      MPV 10 1 fL      Platelets 563 Thousands/uL      nRBC 0 /100 WBCs      Neutrophils Relative 77 %      Immat GRANS % 0 %      Lymphocytes Relative 13 %      Monocytes Relative 9 %      Eosinophils Relative 1 %      Basophils Relative 0 %      Neutrophils Absolute 10 81 Thousands/µL      Immature Grans Absolute 0 06 Thousand/uL      Lymphocytes Absolute 1 89 Thousands/µL      Monocytes Absolute 1 32 Thousand/µL      Eosinophils Absolute 0 12 Thousand/µL      Basophils Absolute 0 05 Thousands/µL                  CT abdomen pelvis with contrast   Final Result by Nohelia Neri MD (12/21 2137)      Findings consistent with appendicitis  No appendicolith, abscess or bowel obstruction  I personally discussed this study with DR Deepika Staley on 12/21/2019 at 9:25 PM       Workstation performed: [id number]               Procedures  Procedures      ED Course                               MDM  Number of Diagnoses or Management Options  Acute appendicitis: new and requires workup  Diagnosis management comments: Patient is a 72-year-old male who presents with right lower quadrant abdominal pain found to be due to appendicitis  Lab work was notable for a leukocytosis of 14,000  CT abdomen pelvis showed appendicitis  Patient was treated with opioid analgesia and was referred to surgery  He will be transferred for surgery evaluation         Amount and/or Complexity of Data Reviewed  Clinical lab tests: ordered and reviewed  Tests in the radiology section of CPT®: ordered and reviewed    Risk of Complications, Morbidity, and/or Mortality  Presenting problems: moderate  Diagnostic procedures: moderate  Management options: moderate    Patient Progress  Patient progress: stable        Disposition  Final diagnoses:   Acute appendicitis     Time reflects when diagnosis was documented in both MDM as applicable and the Disposition within this note     Time User Action Codes Description Comment    12/21/2019  9:43 PM Leah Loomis Add [K35 80] Acute appendicitis     12/22/2019 12:58 AM Livan Vicente Modify [K35 80] Acute appendicitis     12/22/2019  5:56 AM Fadi Matute Modify [K35 80] Acute appendicitis       ED Disposition     ED Disposition Condition Date/Time Comment    Send to OR  Sat Dec 21, 2019 10:31 PM       Follow-up Information     Follow up With Specialties Details Why Contact Info Bhavna Richey MD General Surgery, Wound Care Schedule an appointment as soon as possible for a visit in 2 week(s)  [address] [place]  [address] [facility] [address] [facility]  [phone]            Discharge Medication List as of 12/22/2019  7:23 AM      START taking these medications    Details   HYDROcodone-acetaminophen (NORCO) 5-325 mg per tablet Take 1 tablet by mouth every 4 (four) hours as needed for pain for up to 10 daysMax Daily Amount: 6 tablets, Starting Sun 12/22/2019, Until Wed 1/1/2020, Normal         CONTINUE these medications which have NOT CHANGED    Details   ezetimibe-simvastatin (VYTORIN) 10-10 mg per tablet TAKE 1 TABLET DAILY, Normal      amoxicillin-clavulanate (AUGMENTIN) 875-125 mg per tablet Take 1 tablet by mouth every 12 (twelve) hours for 10 days, Starting Fri 12/13/2019, Until Mon 12/23/2019, Normal      aspirin (ECOTRIN LOW STRENGTH) 81 mg EC tablet Take 81 mg by mouth daily, Historical Med           No discharge procedures on file  ED Provider  Attending physically available and evaluated Autumn Macdonald  HASEEB managed the patient along with the ED Attending      Electronically Signed by         Richard Duarte MD  12/23/19 5073

## 2019-12-23 NOTE — TELEPHONE ENCOUNTER
Two day post op call  Left message for patient  Asked how patient was doing, to call if there were any questions or concerns  Also reminded patient need to set up post op appointment

## 2019-12-30 ENCOUNTER — TELEPHONE (OUTPATIENT)
Dept: UROLOGY | Facility: AMBULATORY SURGERY CENTER | Age: 64
End: 2019-12-30

## 2019-12-30 NOTE — TELEPHONE ENCOUNTER
Patient managed by Barbi Bourgeois is canceled upcoming procedure for 1/02/2020  Patient will call back to r/s  Delay due to having surgery     jamison

## 2020-01-07 ENCOUNTER — OFFICE VISIT (OUTPATIENT)
Dept: SURGERY | Facility: MEDICAL CENTER | Age: 65
End: 2020-01-07

## 2020-01-07 VITALS
SYSTOLIC BLOOD PRESSURE: 156 MMHG | HEIGHT: 73 IN | BODY MASS INDEX: 25.02 KG/M2 | HEART RATE: 80 BPM | TEMPERATURE: 98.8 F | DIASTOLIC BLOOD PRESSURE: 101 MMHG | RESPIRATION RATE: 16 BRPM | WEIGHT: 188.8 LBS

## 2020-01-07 DIAGNOSIS — K40.90 NON-RECURRENT UNILATERAL INGUINAL HERNIA WITHOUT OBSTRUCTION OR GANGRENE: Primary | ICD-10-CM

## 2020-01-07 DIAGNOSIS — Z90.49 STATUS POST LAPAROSCOPIC APPENDECTOMY: ICD-10-CM

## 2020-01-07 PROCEDURE — 99024 POSTOP FOLLOW-UP VISIT: CPT | Performed by: SURGERY

## 2020-01-07 RX ORDER — CEFAZOLIN SODIUM 2 G/50ML
2000 SOLUTION INTRAVENOUS ONCE
Status: CANCELLED | OUTPATIENT
Start: 2020-01-10 | End: 2020-01-10

## 2020-01-07 RX ORDER — SODIUM CHLORIDE, SODIUM LACTATE, POTASSIUM CHLORIDE, CALCIUM CHLORIDE 600; 310; 30; 20 MG/100ML; MG/100ML; MG/100ML; MG/100ML
125 INJECTION, SOLUTION INTRAVENOUS CONTINUOUS
Status: CANCELLED | OUTPATIENT
Start: 2020-01-10

## 2020-01-07 NOTE — PROGRESS NOTES
Assessment/Plan: Jamie Child is a 60year old male who presents today in post-operative state status post laparoscopic appendectomy performed on 12/21/19  Pathology showed  acute appendicitis and serositis  He still has lifting restrictions of no greater than 20 pounds for two more weeks  Light impact cardiovascular activities are permissible at this time  He should avoid core exercises and heavy weight lifting for 4-6 more weeks  Hernia- Patient started having severe groin pain on December 30th  He was seen in the ED and was told that he has left inguinal hernia and this was the cause of his symptoms  Physical exam revealed left inguinal hernia  Explained the etiology of left inguinal hernia  Discussed the risks, benefits and alternatives to a laparoscopic repair of left inguinal hernia versus open repair of left inguinal hernia  Explained the procedures as well as pre and post procedural protocols  Lifting restrictions of no greater than 20 pounds and no strenuous activities for a full month after the surgery  He should not drive or return to work while taking narcotics  Patient would like to proceed with open repair of left inguinal hernia  The office is going to schedule him for open left inguinal hernia repair  He knows to contact the office if any questions or concerns arise  No problem-specific Assessment & Plan notes found for this encounter         Diagnoses and all orders for this visit:    Non-recurrent unilateral inguinal hernia without obstruction or gangrene  -     [facility]; Standing  -     Case request operating room: [facility]  -     Reason for no Mechanical VTE Prophylaxis; Standing  -     lactated ringers infusion  -     ceFAZolin (ANCEF) IVPB (premix) 2,000 mg    Status post laparoscopic appendectomy    Other orders  -     Diet NPO; Sips with meds; Standing  -     Apply Sequential Compression Device; Standing  -     Place sequential compression device; Standing          Subjective:      Patient ID: Janet Bowman is a 60 y o  male  Janet Bowman is a 60year old male who presents today in post-operative state status post laparoscopic appendectomy performed on 12/21/19  Patient reports that he is doing well after the surgery  He reports that he was in [place] on December 30th, and he started having severe groin pain  He then went to the ED and was told that he has inguinal hernia  When he was in the ED they were able to push back the hernia  He says that when he is sitting up on a chair he feels some groin discomfort  He reports that today while he was in the waiting room he felt some groins discomfort  Patient reports that he is going to [facility] in the next week for work and is not sure if he wants to have the surgery before or after this trip  Fortino Law has history of prostatectomy and vasectomy  The following portions of the patient's history were reviewed and updated as appropriate: allergies, current medications, past family history, past medical history, past social history, past surgical history and problem list     Review of Systems   Constitutional: Negative  HENT: Negative  Eyes: Negative  Respiratory: Negative  Cardiovascular: Negative  Gastrointestinal:        Discomfort of the left groin    Endocrine: Negative  Genitourinary: Negative  Musculoskeletal: Negative  Skin: Negative  Allergic/Immunologic: Negative  Neurological: Negative  Hematological: Negative  Psychiatric/Behavioral: Negative  Objective:      BP (!) 156/101   Pulse 80   Temp 98 8 °F (37 1 °C) (Tympanic)   Resp 16   Ht 6' 0 5" (1 842 m)   Wt 85 6 kg (188 lb 12 8 oz)   BMI 25 25 kg/m²          Physical Exam   Constitutional: He is oriented to person, place, and time  He appears well-developed and well-nourished  No distress  HENT:   Head: Normocephalic and atraumatic     Right Ear: External ear normal    Left Ear: External ear normal    Nose: Nose normal    Mouth/Throat: Oropharynx is clear and moist    Eyes: Pupils are equal, round, and reactive to light  Conjunctivae and EOM are normal    Neck: Normal range of motion  Neck supple  No JVD present  No tracheal deviation present  No thyromegaly present  Cardiovascular: Normal rate, regular rhythm, normal heart sounds and intact distal pulses  Exam reveals no gallop and no friction rub  No murmur heard  Pulmonary/Chest: Effort normal and breath sounds normal  No stridor  No respiratory distress  He has no wheezes  He has no rales  He exhibits no tenderness  Abdominal: Soft  Bowel sounds are normal  He exhibits no distension and no mass  There is tenderness (Groin )  There is no rebound and no guarding  A hernia (Left inguinal ) is present  Musculoskeletal: Normal range of motion  He exhibits no edema, tenderness or deformity  Lymphadenopathy:     He has no cervical adenopathy  Neurological: He is alert and oriented to person, place, and time  No cranial nerve deficit  Coordination normal    Skin: Skin is warm and dry  No rash noted  He is not diaphoretic  No erythema  No pallor  Incisions are healing well    Psychiatric: He has a normal mood and affect  His behavior is normal  Judgment and thought content normal    Nursing note and vitals reviewed  By signing my name below, Salvador Champ, attest that this documentation has been prepared under the direction and in the presence of Chuy Johnson MD  Electronically Signed: Wolm Holstein, Scribe  1/7/20  Ivette Rehman, personally performed the services described in this documentation  All medical record entries made by the bonnie were at my direction and in my presence  I have reviewed the chart and discharge instructions and agree that the record reflects my personal performance and is accurate and complete  Chuy Johnson MD  1/7/20

## 2020-01-07 NOTE — LETTER
January 7, 2020     Patient: Courtney Gonzalez   YOB: 1955   Date of Visit: 1/7/2020       To Whom it May Concern:    Courtney Gonzalez is under my professional care  He was seen in my office on 1/7/2020  He Recently underwent emergency surgery on  12/21/2019  He is doing well since his surgery however he is scheduled for a 2nd surgery on 1/11/20  In order to recover fully from his to surgeries he will be unable to lift more than 20 lb or any strenuous activity  In addition it is unsafe for him to leave the country while he is recuperating for at least 4-6 weeks  If you have any questions or concerns, please don't hesitate to call           Sincerely,          Karen Jaramillo MD        CC: No Recipients

## 2020-01-08 ENCOUNTER — ANESTHESIA EVENT (OUTPATIENT)
Dept: PERIOP | Facility: HOSPITAL | Age: 65
End: 2020-01-08
Payer: COMMERCIAL

## 2020-01-08 RX ORDER — ACETAMINOPHEN 325 MG/1
650 TABLET ORAL EVERY 6 HOURS PRN
COMMUNITY
End: 2020-12-03 | Stop reason: ALTCHOICE

## 2020-01-10 ENCOUNTER — ANESTHESIA (OUTPATIENT)
Dept: PERIOP | Facility: HOSPITAL | Age: 65
End: 2020-01-10
Payer: COMMERCIAL

## 2020-01-10 ENCOUNTER — HOSPITAL ENCOUNTER (OUTPATIENT)
Facility: HOSPITAL | Age: 65
Setting detail: OUTPATIENT SURGERY
Discharge: HOME | End: 2020-01-10
Attending: SURGERY | Admitting: SURGERY
Payer: COMMERCIAL

## 2020-01-10 VITALS
TEMPERATURE: 99 F | OXYGEN SATURATION: 97 % | HEIGHT: 74 IN | WEIGHT: 180 LBS | HEART RATE: 95 BPM | BODY MASS INDEX: 23.1 KG/M2 | DIASTOLIC BLOOD PRESSURE: 77 MMHG | SYSTOLIC BLOOD PRESSURE: 158 MMHG | RESPIRATION RATE: 18 BRPM

## 2020-01-10 DIAGNOSIS — K40.90 NON-RECURRENT UNILATERAL INGUINAL HERNIA WITHOUT OBSTRUCTION OR GANGRENE: ICD-10-CM

## 2020-01-10 PROCEDURE — C1727 CATH, BAL TIS DIS, NON-VAS: HCPCS | Performed by: SURGERY

## 2020-01-10 PROCEDURE — 49650 LAP ING HERNIA REPAIR INIT: CPT | Performed by: SURGERY

## 2020-01-10 PROCEDURE — C1781 MESH (IMPLANTABLE): HCPCS | Performed by: SURGERY

## 2020-01-10 PROCEDURE — 49650 LAP ING HERNIA REPAIR INIT: CPT | Performed by: PHYSICIAN ASSISTANT

## 2020-01-10 DEVICE — MESH LAP ANTM LT PROGRIP: Type: IMPLANTABLE DEVICE | Site: INGUINAL | Status: FUNCTIONAL

## 2020-01-10 RX ORDER — METOPROLOL TARTRATE 5 MG/5ML
INJECTION INTRAVENOUS AS NEEDED
Status: DISCONTINUED | OUTPATIENT
Start: 2020-01-10 | End: 2020-01-10 | Stop reason: SURG

## 2020-01-10 RX ORDER — DEXAMETHASONE SODIUM PHOSPHATE 10 MG/ML
INJECTION, SOLUTION INTRAMUSCULAR; INTRAVENOUS AS NEEDED
Status: DISCONTINUED | OUTPATIENT
Start: 2020-01-10 | End: 2020-01-10 | Stop reason: SURG

## 2020-01-10 RX ORDER — LIDOCAINE HYDROCHLORIDE 10 MG/ML
INJECTION, SOLUTION EPIDURAL; INFILTRATION; INTRACAUDAL; PERINEURAL AS NEEDED
Status: DISCONTINUED | OUTPATIENT
Start: 2020-01-10 | End: 2020-01-10 | Stop reason: SURG

## 2020-01-10 RX ORDER — SODIUM CHLORIDE, SODIUM LACTATE, POTASSIUM CHLORIDE, CALCIUM CHLORIDE 600; 310; 30; 20 MG/100ML; MG/100ML; MG/100ML; MG/100ML
125 INJECTION, SOLUTION INTRAVENOUS CONTINUOUS
Status: DISCONTINUED | OUTPATIENT
Start: 2020-01-10 | End: 2020-01-10 | Stop reason: HOSPADM

## 2020-01-10 RX ORDER — HYDROCODONE BITARTRATE AND ACETAMINOPHEN 5; 325 MG/1; MG/1
1 TABLET ORAL EVERY 4 HOURS PRN
Status: DISCONTINUED | OUTPATIENT
Start: 2020-01-10 | End: 2020-01-10 | Stop reason: HOSPADM

## 2020-01-10 RX ORDER — CEFAZOLIN SODIUM 2 G/50ML
2000 SOLUTION INTRAVENOUS ONCE
Status: COMPLETED | OUTPATIENT
Start: 2020-01-10 | End: 2020-01-10

## 2020-01-10 RX ORDER — FENTANYL CITRATE 50 UG/ML
INJECTION, SOLUTION INTRAMUSCULAR; INTRAVENOUS AS NEEDED
Status: DISCONTINUED | OUTPATIENT
Start: 2020-01-10 | End: 2020-01-10 | Stop reason: SURG

## 2020-01-10 RX ORDER — NEOSTIGMINE METHYLSULFATE 1 MG/ML
INJECTION INTRAVENOUS AS NEEDED
Status: DISCONTINUED | OUTPATIENT
Start: 2020-01-10 | End: 2020-01-10 | Stop reason: SURG

## 2020-01-10 RX ORDER — ONDANSETRON 2 MG/ML
4 INJECTION INTRAMUSCULAR; INTRAVENOUS ONCE AS NEEDED
Status: DISCONTINUED | OUTPATIENT
Start: 2020-01-10 | End: 2020-01-10 | Stop reason: HOSPADM

## 2020-01-10 RX ORDER — KETOROLAC TROMETHAMINE 30 MG/ML
INJECTION, SOLUTION INTRAMUSCULAR; INTRAVENOUS AS NEEDED
Status: DISCONTINUED | OUTPATIENT
Start: 2020-01-10 | End: 2020-01-10 | Stop reason: SURG

## 2020-01-10 RX ORDER — ROCURONIUM BROMIDE 10 MG/ML
INJECTION, SOLUTION INTRAVENOUS AS NEEDED
Status: DISCONTINUED | OUTPATIENT
Start: 2020-01-10 | End: 2020-01-10 | Stop reason: SURG

## 2020-01-10 RX ORDER — HYDROCODONE BITARTRATE AND ACETAMINOPHEN 5; 325 MG/1; MG/1
2 TABLET ORAL EVERY 6 HOURS PRN
Status: DISCONTINUED | OUTPATIENT
Start: 2020-01-10 | End: 2020-01-10 | Stop reason: HOSPADM

## 2020-01-10 RX ORDER — MIDAZOLAM HYDROCHLORIDE 2 MG/2ML
INJECTION, SOLUTION INTRAMUSCULAR; INTRAVENOUS AS NEEDED
Status: DISCONTINUED | OUTPATIENT
Start: 2020-01-10 | End: 2020-01-10 | Stop reason: SURG

## 2020-01-10 RX ORDER — GLYCOPYRROLATE 0.2 MG/ML
INJECTION INTRAMUSCULAR; INTRAVENOUS AS NEEDED
Status: DISCONTINUED | OUTPATIENT
Start: 2020-01-10 | End: 2020-01-10 | Stop reason: SURG

## 2020-01-10 RX ORDER — FENTANYL CITRATE/PF 50 MCG/ML
50 SYRINGE (ML) INJECTION
Status: DISCONTINUED | OUTPATIENT
Start: 2020-01-10 | End: 2020-01-10 | Stop reason: HOSPADM

## 2020-01-10 RX ORDER — MEPERIDINE HYDROCHLORIDE 50 MG/ML
12.5 INJECTION INTRAMUSCULAR; INTRAVENOUS; SUBCUTANEOUS ONCE AS NEEDED
Status: DISCONTINUED | OUTPATIENT
Start: 2020-01-10 | End: 2020-01-10 | Stop reason: HOSPADM

## 2020-01-10 RX ORDER — ESMOLOL HYDROCHLORIDE 10 MG/ML
INJECTION INTRAVENOUS AS NEEDED
Status: DISCONTINUED | OUTPATIENT
Start: 2020-01-10 | End: 2020-01-10 | Stop reason: SURG

## 2020-01-10 RX ORDER — PROPOFOL 10 MG/ML
INJECTION, EMULSION INTRAVENOUS AS NEEDED
Status: DISCONTINUED | OUTPATIENT
Start: 2020-01-10 | End: 2020-01-10 | Stop reason: SURG

## 2020-01-10 RX ORDER — HYDROMORPHONE HCL/PF 1 MG/ML
0.5 SYRINGE (ML) INJECTION
Status: DISCONTINUED | OUTPATIENT
Start: 2020-01-10 | End: 2020-01-10 | Stop reason: HOSPADM

## 2020-01-10 RX ORDER — HYDROCODONE BITARTRATE AND ACETAMINOPHEN 5; 325 MG/1; MG/1
1 TABLET ORAL EVERY 6 HOURS PRN
Qty: 20 TABLET | Refills: 0 | Status: SHIPPED | OUTPATIENT
Start: 2020-01-10 | End: 2020-01-20

## 2020-01-10 RX ORDER — SODIUM CHLORIDE 9 MG/ML
125 INJECTION, SOLUTION INTRAVENOUS CONTINUOUS
Status: DISCONTINUED | OUTPATIENT
Start: 2020-01-10 | End: 2020-01-10 | Stop reason: HOSPADM

## 2020-01-10 RX ORDER — HYDROMORPHONE HCL 110MG/55ML
PATIENT CONTROLLED ANALGESIA SYRINGE INTRAVENOUS AS NEEDED
Status: DISCONTINUED | OUTPATIENT
Start: 2020-01-10 | End: 2020-01-10 | Stop reason: SURG

## 2020-01-10 RX ORDER — ONDANSETRON 2 MG/ML
INJECTION INTRAMUSCULAR; INTRAVENOUS AS NEEDED
Status: DISCONTINUED | OUTPATIENT
Start: 2020-01-10 | End: 2020-01-10 | Stop reason: SURG

## 2020-01-10 RX ORDER — BUPIVACAINE HYDROCHLORIDE AND EPINEPHRINE 2.5; 5 MG/ML; UG/ML
INJECTION, SOLUTION EPIDURAL; INFILTRATION; INTRACAUDAL; PERINEURAL AS NEEDED
Status: DISCONTINUED | OUTPATIENT
Start: 2020-01-10 | End: 2020-01-10 | Stop reason: HOSPADM

## 2020-01-10 RX ADMIN — NEOSTIGMINE METHYLSULFATE 2 MG: 1 INJECTION, SOLUTION INTRAVENOUS at 14:36

## 2020-01-10 RX ADMIN — HYDROMORPHONE HYDROCHLORIDE 0.5 MG: 1 INJECTION, SOLUTION INTRAMUSCULAR; INTRAVENOUS; SUBCUTANEOUS at 15:28

## 2020-01-10 RX ADMIN — KETOROLAC TROMETHAMINE 30 MG: 30 INJECTION, SOLUTION INTRAMUSCULAR at 14:26

## 2020-01-10 RX ADMIN — SODIUM CHLORIDE: 0.9 INJECTION, SOLUTION INTRAVENOUS at 12:21

## 2020-01-10 RX ADMIN — GLYCOPYRROLATE 0.4 MG: 0.2 INJECTION INTRAMUSCULAR; INTRAVENOUS at 14:36

## 2020-01-10 RX ADMIN — HYDROMORPHONE HYDROCHLORIDE 0.5 MG: 1 INJECTION, SOLUTION INTRAMUSCULAR; INTRAVENOUS; SUBCUTANEOUS at 15:22

## 2020-01-10 RX ADMIN — HYDROMORPHONE HYDROCHLORIDE 0.5 MG: 2 INJECTION, SOLUTION INTRAMUSCULAR; INTRAVENOUS; SUBCUTANEOUS at 14:20

## 2020-01-10 RX ADMIN — FENTANYL CITRATE 50 MCG: 50 INJECTION, SOLUTION INTRAMUSCULAR; INTRAVENOUS at 15:15

## 2020-01-10 RX ADMIN — FENTANYL CITRATE 50 MCG: 50 INJECTION, SOLUTION INTRAMUSCULAR; INTRAVENOUS at 13:33

## 2020-01-10 RX ADMIN — MIDAZOLAM 2 MG: 1 INJECTION INTRAMUSCULAR; INTRAVENOUS at 12:26

## 2020-01-10 RX ADMIN — LIDOCAINE HYDROCHLORIDE 100 MG: 10 INJECTION, SOLUTION EPIDURAL; INFILTRATION; INTRACAUDAL; PERINEURAL at 12:39

## 2020-01-10 RX ADMIN — NEOSTIGMINE METHYLSULFATE 2 MG: 1 INJECTION, SOLUTION INTRAVENOUS at 14:35

## 2020-01-10 RX ADMIN — PROPOFOL 100 MG: 10 INJECTION, EMULSION INTRAVENOUS at 14:38

## 2020-01-10 RX ADMIN — GLYCOPYRROLATE 0.4 MG: 0.2 INJECTION INTRAMUSCULAR; INTRAVENOUS at 14:35

## 2020-01-10 RX ADMIN — METOPROLOL TARTRATE 1 MG: 5 INJECTION INTRAVENOUS at 14:34

## 2020-01-10 RX ADMIN — FENTANYL CITRATE 100 MCG: 50 INJECTION, SOLUTION INTRAMUSCULAR; INTRAVENOUS at 12:32

## 2020-01-10 RX ADMIN — ROCURONIUM BROMIDE 10 MG: 50 INJECTION, SOLUTION INTRAVENOUS at 14:12

## 2020-01-10 RX ADMIN — FENTANYL CITRATE 50 MCG: 50 INJECTION, SOLUTION INTRAMUSCULAR; INTRAVENOUS at 13:09

## 2020-01-10 RX ADMIN — ESMOLOL HYDROCHLORIDE 40 MG: 10 INJECTION, SOLUTION INTRAVENOUS at 14:38

## 2020-01-10 RX ADMIN — PHENYLEPHRINE HYDROCHLORIDE 100 MCG: 10 INJECTION INTRAVENOUS at 12:52

## 2020-01-10 RX ADMIN — ONDANSETRON 4 MG: 2 INJECTION INTRAMUSCULAR; INTRAVENOUS at 12:51

## 2020-01-10 RX ADMIN — PROPOFOL 200 MG: 10 INJECTION, EMULSION INTRAVENOUS at 12:39

## 2020-01-10 RX ADMIN — DEXAMETHASONE SODIUM PHOSPHATE 5 MG: 10 INJECTION, SOLUTION INTRAMUSCULAR; INTRAVENOUS at 12:51

## 2020-01-10 RX ADMIN — CEFAZOLIN SODIUM 2000 MG: 2 SOLUTION INTRAVENOUS at 12:26

## 2020-01-10 RX ADMIN — HYDROCODONE BITARTRATE AND ACETAMINOPHEN 1 TABLET: 5; 325 TABLET ORAL at 16:31

## 2020-01-10 RX ADMIN — SODIUM CHLORIDE: 0.9 INJECTION, SOLUTION INTRAVENOUS at 13:36

## 2020-01-10 RX ADMIN — PHENYLEPHRINE HYDROCHLORIDE 200 MCG: 10 INJECTION INTRAVENOUS at 12:49

## 2020-01-10 RX ADMIN — FENTANYL CITRATE 50 MCG: 50 INJECTION, SOLUTION INTRAMUSCULAR; INTRAVENOUS at 15:06

## 2020-01-10 RX ADMIN — ROCURONIUM BROMIDE 10 MG: 50 INJECTION, SOLUTION INTRAVENOUS at 13:39

## 2020-01-10 RX ADMIN — ROCURONIUM BROMIDE 50 MG: 50 INJECTION, SOLUTION INTRAVENOUS at 12:39

## 2020-01-10 NOTE — ANESTHESIA PREPROCEDURE EVALUATION
Review of Systems/Medical History  Patient summary reviewed        Cardiovascular  Negative cardio ROS Hyperlipidemia, Hypertension ,   Comment: Borderline HTN  No meds ,  Pulmonary  Negative pulmonary ROS        GI/Hepatic  Negative GI/hepatic ROS          Negative  ROS        Endo/Other  Negative endo/other ROS      GYN  Negative gynecology ROS          Hematology  Negative hematology ROS      Musculoskeletal  Negative musculoskeletal ROS   Arthritis     Neurology  Negative neurology ROS      Psychology   Negative psychology ROS              Physical Exam    Airway    Mallampati score: II  TM Distance: >3 FB  Neck ROM: full     Dental   No notable dental hx     Cardiovascular  Comment: Negative ROS, Rhythm: regular, Rate: normal, Cardiovascular exam normal    Pulmonary  Pulmonary exam normal Breath sounds clear to auscultation,     Other Findings        Anesthesia Plan  ASA Score- 2     Anesthesia Type- general with ASA Monitors  Additional Monitors:   Airway Plan: ETT  Plan Factors-    Induction- intravenous  Postoperative Plan-     Informed Consent- Anesthetic plan and risks discussed with patient

## 2020-01-10 NOTE — OP NOTE
OPERATIVE REPORT  PATIENT NAME: Yaquelin Carlin    :  1955  MRN: [id number]  Pt Location: [facility] 02    SURGERY DATE: 1/10/2020    Surgeon(s) and Role:     * Sonia Jorgensen MD - Primary     Compa Jeff PA-C - Assisting    Preop Diagnosis:  Non-recurrent unilateral inguinal hernia without obstruction or gangrene [K40 90]    Post-Op Diagnosis Codes:     * Non-recurrent unilateral inguinal hernia without obstruction or gangrene [K40 90]    Procedure(s) (LRB):  LAPAROSCOPIC INGUINAL HERNIA REPAIR WITH MESH (Left)    Specimen(s):  * No specimens in log *    Estimated Blood Loss:   Minimal    Drains:  Closed/Suction Drain Right Abdomen Bulb 10 Fr  (Active)   Number of days: 438       Urethral Catheter Latex 20 Fr  (Active)   Number of days: 438       [REMOVED] NG/OG/Enteral Tube Orogastric 18 Fr Right mouth (Removed)   Number of days: 0       Anesthesia Type:   General    Operative Indications:  Non-recurrent unilateral inguinal hernia without obstruction or gangrene [K40 90]      Operative Findings:  Left indirect inguinal hernia  Extensive dense scarring from prostatectomy    Complications:   None    Procedure and Technique:      The patient was seen again in the Holding Room  The risks, benefits, complications, treatment options, and expected outcomes were discussed with the patient  The patient and/or family concurred with the proposed plan, giving informed consent  The site of surgery properly noted/marked  The patient was taken to Operating Room, identified by verbal and visual wrist identification and the procedure verified as Laparoscopic left  inguinal hernia repair with mesh  The above information was confirmed  Prior to the induction of general anesthesia, antibiotic prophylaxis was administered  General endotracheal anesthesia was then administered and tolerated well  After the induction, the abdomen was prepped in the usual sterile fashion   The patient was positioned in the supine position  A Time Out was held after prepping and draping in sterile fashion  An incision was made in the infraumbilical fold after infusion of 0 25% marcaine with epinephrine with an #11 blade scalpel  Subcutaneous tissues were dissected with bovie cautery down to the anterior fascia  The anterior fascia was opened revealing the rectus muscle  This was retracted laterally and a ballon tipped dissector was passed in the preperitoneal space down to the pubic bone  The balloon was inflated under direct visualization  The a balloon tipped trocar was placed in the space and the gas was turned on  Two 5mm trocars were placed in the midline below the umbilical port  Attention was turned to the midline  The avascular plane was dissected down to the midline until the pubic bone was visualized  There was no evidence of a direct hernia  Attention was turned out laterally creating the pocket and avascular plane between the peritoneum and the sidewall  Attention was turned to the indirect space and the peritoneum was dissected off the cord structures carefully peeling this layers apart  Eventually the peritoneum was dissected down well below where the vas deferens turned medially  Now the dissection was complete  The patient had evidence of an indirect inguinal hernia  A Progrip  mesh was rolled up and passed through the trocar and deployed in appropriate position  The peritoneal edge was well below the bottom portion of the mesh  There is excellent hemostasis  The gas was then turned off and the air was evacuated watching to ensure that the peritoneum held the mesh in appropriate position  The trochars were removed  The umbilical fascia was closed with 2 figure-of-eight 0 Vicryl sutures  Skin was closed with interrupted 4-0 Monocryl sutures  Histocry was applied  This text is generated with voice recognition software  There may be translation, syntax,  or grammatical errors   If you have any questions, please contact the dictating provider  PA was medically necessary for the surgical safety of the case, including suturing, retraction, hemostasis           I was present for the entire procedure and A qualified resident physician was not available    Patient Disposition:  PACU     SIGNATURE: Kim Houston MD  DATE: January 10, 2020  TIME: 2:34 PM

## 2020-01-10 NOTE — DISCHARGE INSTRUCTIONS
[facility]  Post-Operative Care Instructions  Dr Philis Gottron MD, Maury Byers  [phone]    1  General: You will feel pulling sensations around the wound or funny aches and pains around the incisions  This is normal  Even minor surgery is a change in your body and this is your bodys way of reaction to it  If you have had abdominal surgery, it may help to support the incision with a small pillow or blanket for comfort when moving or coughing  2  Wound care:  Okay to shower  The glue will fall off over the next week or 2     3  Water: You may shower over the wound, unless there are drain tubes left in place  Do not bathe or use a pool or hot tub until cleared by the physician  4  Activity: You may go up and down stairs, walk as much as you are comfortable, but walk at least 3 times each day  If you have had abdominal surgery, do not lift anything heavier than 20 pounds for at least 4 weeks, unless cleared by the doctor  5  Diet: You may resume a regular diet  If you had a same-day surgery or overnight stay surgery, he may wish to eat lightly for a few days: soups, crackers, and sandwiches  You may resume a regular diet when ready  6  Medications: Resume all of your previous medications, unless told otherwise by the doctor  A good option for pain control is to start with Tylenol and ibuprofen and alternate taking them every 2 hours for 1-2 days  If this is not sufficient then substituted the Tylenol for the narcotic pain medicine prescribed  Insure that you do not take more than 4000 mg of Tylenol per day  You do not need to take the narcotic pain medications unless you are having significant pain and discomfort  7  Driving: You will need someone to drive you home on the day of surgery  Do not drive or make any important decisions while on narcotic pain medication or 24 hours and after anesthesia or sedation for surgery   Generally, you may drive when your off all narcotic pain medications  8  Upset Stomach: You may take Maalox, Tums, or similar items for an upset stomach  If your narcotic pain medication causes an upset stomach, do not take it on an empty stomach  Try taking it with at least some crackers or toast      9  Constipation: Patients often experienced constipation after surgery  You may take over-the-counter medication for this, such as Metamucil, Senokot, Dulcolax, milk of magnesia, etc  You may take a suppository unless you have had anorectal surgery such as a procedure on your hemorrhoids  If you experience significant nausea or vomiting after abdominal surgery, call the office before trying any of these medications  10  Call the office: If you are experiencing any of the following, fevers above 101 5°, significant nausea or vomiting, if the wound develops drainage and/or is excessive redness around the wound, or if you have significant diarrhea or other worsening symptoms  11  Pain: You may be given a prescription for pain  This will be given to the hospital, the day of surgery  12  Sexual Activity: You may resume sexual activity when you feel ready and comfortable and your incision is sealed and healed without apparent infection risk

## 2020-01-10 NOTE — ANESTHESIA POSTPROCEDURE EVALUATION
Post-Op Assessment Note    CV Status:  Stable    Pain management: adequate     Mental Status:  Alert and awake   Hydration Status:  Euvolemic   PONV Controlled:  Controlled   Airway Patency:  Patent   Post Op Vitals Reviewed: Yes      Staff: Anesthesiologist           /70 (01/10/20 1605)    Temp      Pulse 91 (01/10/20 1605)   Resp 16 (01/10/20 1605)    SpO2 97 % (01/10/20 1605)

## 2020-01-14 ENCOUNTER — TELEPHONE (OUTPATIENT)
Dept: SURGERY | Facility: MEDICAL CENTER | Age: 65
End: 2020-01-14

## 2020-01-14 NOTE — TELEPHONE ENCOUNTER
Patient called with concern as to why he is so swollen and "black and blue"  Had laparoscopic [facility] repair done on 1/10/20 by Dr Vera Ours  He states that no one explained to him that you can have bruising in testicle area after surgery as well as swelling and "a lot of pain" as the surgery was done laparoscopic"? Explained to him that this is normal after hernia surgery  Is different situation with each patient  Instructed him to use ice at 15 minute to 20 minute intervals for about five days  Also upset as "no one from the hospital of office called him after surgery"  I did offer him an appointment tomorrow with surgeon to ease his concerns  He close not to schedule  Does have his post op appointment next Tuesday 1/21/20  Also requested a copy of his operative report which he will be picking up tomorrow  Surgeon made aware

## 2020-01-14 NOTE — TELEPHONE ENCOUNTER
Two day post op call  Spoke to patient  Patient had spoken to Tano Reyes shortly before my call and his concerns were answered  Patient seemed more at ease and happy with answers from nurse  Told him to call if any other questions or concerns and reminded him of post op appointment

## 2020-01-22 ENCOUNTER — OFFICE VISIT (OUTPATIENT)
Dept: SURGERY | Facility: MEDICAL CENTER | Age: 65
End: 2020-01-22

## 2020-01-22 VITALS — BODY MASS INDEX: 25.21 KG/M2 | TEMPERATURE: 98.7 F | WEIGHT: 196.4 LBS | HEIGHT: 74 IN

## 2020-01-22 DIAGNOSIS — Z87.19 STATUS POST LAPAROSCOPIC HERNIA REPAIR: Primary | ICD-10-CM

## 2020-01-22 DIAGNOSIS — Z98.890 STATUS POST LAPAROSCOPIC HERNIA REPAIR: Primary | ICD-10-CM

## 2020-01-22 PROCEDURE — 99024 POSTOP FOLLOW-UP VISIT: CPT | Performed by: SURGERY

## 2020-01-22 NOTE — PROGRESS NOTES
Assessment/Plan: Nonnie Sever is a 60year old male who presents today in post operative state status post laparoscopic left inguinal hernia repair performed on 1/10/20  Patient is doing well after the surgery  He still has lifting restrictions of no greater than 20 pounds for two more weeks  Light impact cardiovascular activities are permissible at this time  He should avoid core exercises and heavy weight lifting for 4-6 more weeks  He may follow up as needed  She knows to contact the office if any questions or concerns arise  No problem-specific Assessment & Plan notes found for this encounter  Diagnoses and all orders for this visit:    Status post laparoscopic hernia repair          Subjective:      Patient ID: Nonnie Sever is a 60 y o  male  Nonnie Sever is a 60year old male who presents today in post operative state status post laparoscopic left inguinal hernia repair performed on 1/10/20  Patient was worried about swelling after the surgery  He says that the first two days were very rough for him, he has having black and blue bruising on his left groin  The following portions of the patient's history were reviewed and updated as appropriate: allergies, current medications, past family history, past medical history, past social history, past surgical history and problem list     Review of Systems      Objective:      Temp 98 7 °F (37 1 °C) (Tympanic)   Ht 6' 1 5" (1 867 m)   Wt 89 1 kg (196 lb 6 4 oz)   BMI 25 56 kg/m²          Physical Exam      By signing my name below, I, Sheril Ormond, attest that this documentation has been prepared under the direction and in the presence of Guy Carson MD  Electronically Signed: Sheril Ormond, Scribe  1/22/20  Haley Lipscomb, personally performed the services described in this documentation  All medical record entries made by the bonnie were at my direction and in my presence   I have reviewed the chart and discharge instructions and agree that the record reflects my personal performance and is accurate and complete  Ciera Lopez MD  1/22/20

## 2020-03-31 ENCOUNTER — TELEMEDICINE (OUTPATIENT)
Dept: FAMILY MEDICINE CLINIC | Facility: CLINIC | Age: 65
End: 2020-03-31
Payer: COMMERCIAL

## 2020-03-31 DIAGNOSIS — H93.13 TINNITUS OF BOTH EARS: Primary | ICD-10-CM

## 2020-03-31 PROCEDURE — 99421 OL DIG E/M SVC 5-10 MIN: CPT | Performed by: FAMILY MEDICINE

## 2020-03-31 RX ORDER — PREDNISONE 10 MG/1
TABLET ORAL
Qty: 30 TABLET | Refills: 0 | Status: SHIPPED | OUTPATIENT
Start: 2020-03-31 | End: 2020-06-15 | Stop reason: ALTCHOICE

## 2020-03-31 NOTE — PROGRESS NOTES
Virtual Regular Visit    Problem List Items Addressed This Visit     None      Visit Diagnoses     Tinnitus of both ears    -  Primary    Relevant Medications    predniSONE 10 mg tablet      Tinnitus of unclear etiology  Possible eustachian tube dysfunction  Treat with antihistamines and add 10 day prednisone taper  Call if not improving  Reason for visit is ringing in ears    Encounter provider Kiley Clemons DO    Provider located at [place] [address]  [address] [facility] [address] [facility] [address]  [phone]      Recent Visits  No visits were found meeting these conditions  Showing recent visits within past 7 days and meeting all other requirements     Future Appointments  No visits were found meeting these conditions  Showing future appointments within next 150 days and meeting all other requirements        The patient was identified by name and date of birth  Kathie Iniguez was informed that this is a telemedicine visit and that the visit is being conducted through [address] [place] [address] and patient was informed that this is not a secure, HIPAA-complaint platform  he agrees to proceed     My office door was closed  No one else was in the room  He acknowledged consent and understanding of privacy and security of the video platform  The patient has agreed to participate and understands they can discontinue the visit at any time  Patient is aware this is a billable service  Subjective  Kathie Iniguez is a 60 y o  male with a chief complaint of ringing in both ears  This is been going on for more than 1 week  He has no pain no postnasal drip no sore throat headache or dizziness  He does note that his ears did pop sometime last week but the ringing has persisted  He has no loss of hearing  He feels well otherwise  He notes that he did fly internationally, returning from [facility] approximately 2 months ago         Past Medical History:   Diagnosis Date    Abnormal cardiovascular stress test     last assessed-11/13/2015    Abnormal electrocardiogram     last assessed-11/2/2015    Arthritis     Cancer [facility])     prostate    Elevated PSA     Elevated PSA     Encounter for screening colonoscopy for non-high-risk patient     family hx possible of colon ca    Environmental and seasonal allergies     Hallux rigidus     last assessed-4/24/2014    Hyperlipidemia     Irregular heart beat     "rapid heart beat episode X1" - tested - no issue    Organic impotence     last assessed-4/24/2014    Rapid pulse     resolved-1/19/2016    Tachycardia     last assessed-11/2/2015    Wears glasses        Past Surgical History:   Procedure Laterality Date    APPENDECTOMY      BACK SURGERY      L4-L5    COLONOSCOPY      X 3-4     OTHER SURGICAL HISTORY      spinal diskectomy    NH COLONOSCOPY FLX DX W/COLLJ SPEC WHEN PFRMD N/A 9/30/2016    Procedure: COLONOSCOPY;  Surgeon: Ji Wesley MD;  Location: [facility];   Service: Colorectal    NH LAP,APPENDECTOMY N/A 12/21/2019    Procedure: APPENDECTOMY LAPAROSCOPIC;  Surgeon: Ana Nuñez MD;  Location: [facility];  Service: General    NH LAP,PROSTATECTOMY,RADICAL,W/NERVE SPARE,INCL ROBOTIC N/A 10/29/2018    Procedure: PROSTATECTOMY RADICAL W ROBOTICS; BILATERAL PELVIC LYMPH NODE DISSECTION; LYSIS OF ADHESIONS;  Surgeon: Emanuel Guzmán MD;  Location: [facility];  Service: Urology    NH REPAIR ING HERNIA,5+Y/O,REDUCIBL Left 1/10/2020    Procedure: LAPAROSCOPIC INGUINAL HERNIA REPAIR WITH MESH;  Surgeon: Ana Nuñez MD;  Location: [facility];  Service: General    PROSTATE BIOPSY Bilateral 08/2018    REDUCTION OF TORSION OF TESTIS  2001    VASECTOMY      WISDOM TOOTH EXTRACTION         Current Outpatient Medications   Medication Sig Dispense Refill    acetaminophen (TYLENOL) 325 mg tablet Take 650 mg by mouth every 6 (six) hours as needed for mild pain      aspirin (ECOTRIN LOW STRENGTH) 81 mg EC tablet Take 81 mg by mouth daily      ezetimibe-simvastatin (VYTORIN) 10-10 mg per tablet TAKE 1 TABLET DAILY 90 tablet 1    predniSONE 10 mg tablet Five p o  for 2 days Four p o  for 2 Three p o  for 2 days Two p o  for 2 days One p o  for 2 days 30 tablet 0     No current facility-administered medications for this visit  Allergies   Allergen Reactions    Lactose GI Intolerance       Review of Systems   Constitutional: Negative for chills, diaphoresis, fatigue, fever and unexpected weight change  HENT: Positive for tinnitus  Respiratory: Negative for cough and shortness of breath  Cardiovascular: Negative for chest pain and leg swelling  Gastrointestinal: Negative for abdominal pain  Musculoskeletal: Negative for arthralgias  Physical Exam   Constitutional: He appears well-developed and well-nourished  No distress  HENT:   Head: Atraumatic  Eyes: Pupils are equal, round, and reactive to light  Neck: Normal range of motion  Pulmonary/Chest: Effort normal  No respiratory distress  Skin: No rash noted  He is not diaphoretic  No pallor  Psychiatric: He has a normal mood and affect  His behavior is normal  Judgment and thought content normal         I spent 10 minutes with the patient during this visit    Typical E/M code for this encounter is 46769

## 2020-04-06 DIAGNOSIS — I10 ESSENTIAL HYPERTENSION: Primary | ICD-10-CM

## 2020-04-06 RX ORDER — LISINOPRIL 10 MG/1
10 TABLET ORAL DAILY
Qty: 30 TABLET | Refills: 1 | Status: SHIPPED | OUTPATIENT
Start: 2020-04-06 | End: 2020-04-13 | Stop reason: SDUPTHER

## 2020-04-07 DIAGNOSIS — E78.2 MIXED HYPERLIPIDEMIA: ICD-10-CM

## 2020-04-07 RX ORDER — EZETIMIBE AND SIMVASTATIN 10; 10 MG/1; MG/1
TABLET ORAL
Qty: 90 TABLET | Refills: 1 | Status: SHIPPED | OUTPATIENT
Start: 2020-04-07 | End: 2020-10-09 | Stop reason: SDUPTHER

## 2020-04-08 ENCOUNTER — TELEPHONE (OUTPATIENT)
Dept: FAMILY MEDICINE CLINIC | Facility: CLINIC | Age: 65
End: 2020-04-08

## 2020-04-09 DIAGNOSIS — H93.19 TINNITUS, UNSPECIFIED LATERALITY: Primary | ICD-10-CM

## 2020-04-13 DIAGNOSIS — I10 ESSENTIAL HYPERTENSION: ICD-10-CM

## 2020-04-13 RX ORDER — LISINOPRIL 5 MG/1
5 TABLET ORAL DAILY
Qty: 30 TABLET | Refills: 1 | Status: SHIPPED | OUTPATIENT
Start: 2020-04-13 | End: 2020-05-05

## 2020-05-01 LAB — PSA SERPL-MCNC: <0.1 NG/ML

## 2020-05-05 DIAGNOSIS — I10 ESSENTIAL HYPERTENSION: ICD-10-CM

## 2020-05-05 RX ORDER — LISINOPRIL 5 MG/1
TABLET ORAL
Qty: 30 TABLET | Refills: 1 | Status: SHIPPED | OUTPATIENT
Start: 2020-05-05 | End: 2020-05-31

## 2020-05-07 ENCOUNTER — TELEMEDICINE (OUTPATIENT)
Dept: UROLOGY | Facility: MEDICAL CENTER | Age: 65
End: 2020-05-07
Payer: COMMERCIAL

## 2020-05-07 DIAGNOSIS — C61 MALIGNANT NEOPLASM OF PROSTATE (HCC): Primary | ICD-10-CM

## 2020-05-07 DIAGNOSIS — Z90.79 HISTORY OF ROBOT-ASSISTED LAPAROSCOPIC RADICAL PROSTATECTOMY: ICD-10-CM

## 2020-05-07 DIAGNOSIS — N52.31 ERECTILE DYSFUNCTION FOLLOWING RADICAL PROSTATECTOMY: ICD-10-CM

## 2020-05-07 PROCEDURE — 99213 OFFICE O/P EST LOW 20 MIN: CPT | Performed by: UROLOGY

## 2020-05-31 DIAGNOSIS — I10 ESSENTIAL HYPERTENSION: ICD-10-CM

## 2020-05-31 RX ORDER — LISINOPRIL 5 MG/1
TABLET ORAL
Qty: 90 TABLET | Refills: 1 | Status: SHIPPED | OUTPATIENT
Start: 2020-05-31 | End: 2020-12-15

## 2020-06-05 ENCOUNTER — OFFICE VISIT (OUTPATIENT)
Dept: PODIATRY | Facility: CLINIC | Age: 65
End: 2020-06-05
Payer: COMMERCIAL

## 2020-06-05 VITALS — BODY MASS INDEX: 25.6 KG/M2 | HEIGHT: 72 IN | WEIGHT: 189 LBS

## 2020-06-05 DIAGNOSIS — M20.22 HALLUX RIGIDUS OF LEFT FOOT: ICD-10-CM

## 2020-06-05 DIAGNOSIS — M20.21 HALLUX RIGIDUS OF RIGHT FOOT: Primary | ICD-10-CM

## 2020-06-05 PROCEDURE — 20550 NJX 1 TENDON SHEATH/LIGAMENT: CPT

## 2020-06-05 RX ORDER — TRIAMCINOLONE ACETONIDE 40 MG/ML
20 INJECTION, SUSPENSION INTRA-ARTICULAR; INTRAMUSCULAR ONCE
Status: COMPLETED | OUTPATIENT
Start: 2020-06-05 | End: 2020-06-05

## 2020-06-05 RX ORDER — LIDOCAINE HYDROCHLORIDE 10 MG/ML
1 INJECTION, SOLUTION EPIDURAL; INFILTRATION; INTRACAUDAL; PERINEURAL ONCE
Status: COMPLETED | OUTPATIENT
Start: 2020-06-05 | End: 2020-06-05

## 2020-06-05 RX ADMIN — TRIAMCINOLONE ACETONIDE 20 MG: 40 INJECTION, SUSPENSION INTRA-ARTICULAR; INTRAMUSCULAR at 13:59

## 2020-06-05 RX ADMIN — LIDOCAINE HYDROCHLORIDE 1 ML: 10 INJECTION, SOLUTION EPIDURAL; INFILTRATION; INTRACAUDAL; PERINEURAL at 13:59

## 2020-06-10 LAB
ALBUMIN SERPL-MCNC: 4.4 G/DL (ref 3.6–5.1)
ALBUMIN/GLOB SERPL: 1.9 (CALC) (ref 1–2.5)
ALP SERPL-CCNC: 48 U/L (ref 35–144)
ALT SERPL-CCNC: 22 U/L (ref 9–46)
AST SERPL-CCNC: 17 U/L (ref 10–35)
BILIRUB SERPL-MCNC: 0.8 MG/DL (ref 0.2–1.2)
BUN SERPL-MCNC: 22 MG/DL (ref 7–25)
BUN/CREAT SERPL: NORMAL (CALC) (ref 6–22)
CALCIUM SERPL-MCNC: 9.3 MG/DL (ref 8.6–10.3)
CHLORIDE SERPL-SCNC: 101 MMOL/L (ref 98–110)
CHOLEST SERPL-MCNC: 181 MG/DL
CHOLEST/HDLC SERPL: 2.8 (CALC)
CO2 SERPL-SCNC: 30 MMOL/L (ref 20–32)
CREAT SERPL-MCNC: 0.98 MG/DL (ref 0.7–1.25)
GLOBULIN SER CALC-MCNC: 2.3 G/DL (CALC) (ref 1.9–3.7)
GLUCOSE SERPL-MCNC: 93 MG/DL (ref 65–99)
HDLC SERPL-MCNC: 64 MG/DL
LDLC SERPL CALC-MCNC: 98 MG/DL (CALC)
NONHDLC SERPL-MCNC: 117 MG/DL (CALC)
POTASSIUM SERPL-SCNC: 4.2 MMOL/L (ref 3.5–5.3)
PROT SERPL-MCNC: 6.7 G/DL (ref 6.1–8.1)
SL AMB EGFR AFRICAN AMERICAN: 95 ML/MIN/1.73M2
SL AMB EGFR NON AFRICAN AMERICAN: 82 ML/MIN/1.73M2
SODIUM SERPL-SCNC: 137 MMOL/L (ref 135–146)
TRIGL SERPL-MCNC: 92 MG/DL
TSH SERPL-ACNC: 1.67 MIU/L (ref 0.4–4.5)

## 2020-06-15 ENCOUNTER — OFFICE VISIT (OUTPATIENT)
Dept: FAMILY MEDICINE CLINIC | Facility: CLINIC | Age: 65
End: 2020-06-15
Payer: COMMERCIAL

## 2020-06-15 VITALS
SYSTOLIC BLOOD PRESSURE: 138 MMHG | HEIGHT: 73 IN | BODY MASS INDEX: 25.29 KG/M2 | RESPIRATION RATE: 16 BRPM | WEIGHT: 190.8 LBS | TEMPERATURE: 96.9 F | OXYGEN SATURATION: 99 % | HEART RATE: 77 BPM | DIASTOLIC BLOOD PRESSURE: 72 MMHG

## 2020-06-15 DIAGNOSIS — E78.2 MIXED HYPERLIPIDEMIA: Primary | ICD-10-CM

## 2020-06-15 DIAGNOSIS — C61 MALIGNANT NEOPLASM OF PROSTATE (HCC): ICD-10-CM

## 2020-06-15 PROBLEM — I10 ESSENTIAL HYPERTENSION: Status: ACTIVE | Noted: 2020-06-15

## 2020-06-15 PROCEDURE — 3078F DIAST BP <80 MM HG: CPT | Performed by: FAMILY MEDICINE

## 2020-06-15 PROCEDURE — 3075F SYST BP GE 130 - 139MM HG: CPT | Performed by: FAMILY MEDICINE

## 2020-06-15 PROCEDURE — 1036F TOBACCO NON-USER: CPT | Performed by: FAMILY MEDICINE

## 2020-06-15 PROCEDURE — 99214 OFFICE O/P EST MOD 30 MIN: CPT | Performed by: FAMILY MEDICINE

## 2020-06-15 PROCEDURE — 3008F BODY MASS INDEX DOCD: CPT | Performed by: FAMILY MEDICINE

## 2020-09-16 ENCOUNTER — IMMUNIZATIONS (OUTPATIENT)
Dept: FAMILY MEDICINE CLINIC | Facility: CLINIC | Age: 65
End: 2020-09-16
Payer: COMMERCIAL

## 2020-09-16 VITALS — TEMPERATURE: 97.8 F

## 2020-09-16 DIAGNOSIS — Z23 ENCOUNTER FOR IMMUNIZATION: ICD-10-CM

## 2020-09-16 PROCEDURE — 90471 IMMUNIZATION ADMIN: CPT | Performed by: FAMILY MEDICINE

## 2020-09-16 PROCEDURE — 90682 RIV4 VACC RECOMBINANT DNA IM: CPT | Performed by: FAMILY MEDICINE

## 2020-09-21 ENCOUNTER — CLINICAL SUPPORT (OUTPATIENT)
Dept: FAMILY MEDICINE CLINIC | Facility: CLINIC | Age: 65
End: 2020-09-21
Payer: COMMERCIAL

## 2020-09-21 VITALS — TEMPERATURE: 96 F

## 2020-09-21 DIAGNOSIS — Z23 NEED FOR SHINGLES VACCINE: Primary | ICD-10-CM

## 2020-09-21 PROCEDURE — 90471 IMMUNIZATION ADMIN: CPT | Performed by: FAMILY MEDICINE

## 2020-09-21 PROCEDURE — 90750 HZV VACC RECOMBINANT IM: CPT | Performed by: FAMILY MEDICINE

## 2020-10-09 DIAGNOSIS — E78.2 MIXED HYPERLIPIDEMIA: ICD-10-CM

## 2020-10-09 RX ORDER — EZETIMIBE AND SIMVASTATIN 10; 10 MG/1; MG/1
TABLET ORAL
Qty: 90 TABLET | Refills: 1 | Status: SHIPPED | OUTPATIENT
Start: 2020-10-09 | End: 2021-04-14

## 2020-10-22 DIAGNOSIS — C61 MALIGNANT NEOPLASM OF PROSTATE (HCC): Primary | ICD-10-CM

## 2020-11-09 ENCOUNTER — ANESTHESIA EVENT (OUTPATIENT)
Dept: GASTROENTEROLOGY | Facility: HOSPITAL | Age: 65
End: 2020-11-09

## 2020-11-09 ENCOUNTER — HOSPITAL ENCOUNTER (OUTPATIENT)
Dept: GASTROENTEROLOGY | Facility: HOSPITAL | Age: 65
Setting detail: OUTPATIENT SURGERY
Discharge: HOME | End: 2020-11-09
Attending: COLON & RECTAL SURGERY
Payer: COMMERCIAL

## 2020-11-09 ENCOUNTER — ANESTHESIA (OUTPATIENT)
Dept: GASTROENTEROLOGY | Facility: HOSPITAL | Age: 65
End: 2020-11-09

## 2020-11-09 VITALS
RESPIRATION RATE: 16 BRPM | DIASTOLIC BLOOD PRESSURE: 79 MMHG | BODY MASS INDEX: 24.65 KG/M2 | OXYGEN SATURATION: 99 % | WEIGHT: 188 LBS | TEMPERATURE: 98.4 F | SYSTOLIC BLOOD PRESSURE: 128 MMHG | HEART RATE: 85 BPM

## 2020-11-09 VITALS — HEART RATE: 83 BPM

## 2020-11-09 DIAGNOSIS — Z12.11 ENCOUNTER FOR SCREENING FOR MALIGNANT NEOPLASM OF COLON: ICD-10-CM

## 2020-11-09 DIAGNOSIS — Z83.71 FAMILY HISTORY OF COLONIC POLYPS: ICD-10-CM

## 2020-11-09 RX ORDER — LIDOCAINE HYDROCHLORIDE 20 MG/ML
INJECTION, SOLUTION EPIDURAL; INFILTRATION; INTRACAUDAL; PERINEURAL AS NEEDED
Status: DISCONTINUED | OUTPATIENT
Start: 2020-11-09 | End: 2020-11-09

## 2020-11-09 RX ORDER — SODIUM CHLORIDE 9 MG/ML
125 INJECTION, SOLUTION INTRAVENOUS CONTINUOUS
Status: CANCELLED | OUTPATIENT
Start: 2020-11-09

## 2020-11-09 RX ORDER — PROPOFOL 10 MG/ML
INJECTION, EMULSION INTRAVENOUS AS NEEDED
Status: DISCONTINUED | OUTPATIENT
Start: 2020-11-09 | End: 2020-11-09

## 2020-11-09 RX ORDER — SODIUM CHLORIDE 9 MG/ML
INJECTION, SOLUTION INTRAVENOUS CONTINUOUS PRN
Status: DISCONTINUED | OUTPATIENT
Start: 2020-11-09 | End: 2020-11-09

## 2020-11-09 RX ADMIN — LIDOCAINE HYDROCHLORIDE 20 MG: 20 INJECTION, SOLUTION EPIDURAL; INFILTRATION; INTRACAUDAL; PERINEURAL at 11:40

## 2020-11-09 RX ADMIN — SODIUM CHLORIDE: 0.9 INJECTION, SOLUTION INTRAVENOUS at 11:36

## 2020-11-09 RX ADMIN — PROPOFOL 50 MG: 10 INJECTION, EMULSION INTRAVENOUS at 11:41

## 2020-11-09 RX ADMIN — PROPOFOL 50 MG: 10 INJECTION, EMULSION INTRAVENOUS at 11:45

## 2020-11-09 RX ADMIN — PROPOFOL 50 MG: 10 INJECTION, EMULSION INTRAVENOUS at 11:43

## 2020-11-09 RX ADMIN — PROPOFOL 100 MG: 10 INJECTION, EMULSION INTRAVENOUS at 11:40

## 2020-11-09 RX ADMIN — PROPOFOL 50 MG: 10 INJECTION, EMULSION INTRAVENOUS at 11:49

## 2020-12-02 ENCOUNTER — LAB (OUTPATIENT)
Dept: LAB | Facility: CLINIC | Age: 65
End: 2020-12-02
Payer: COMMERCIAL

## 2020-12-02 DIAGNOSIS — C61 MALIGNANT NEOPLASM OF PROSTATE (HCC): ICD-10-CM

## 2020-12-02 LAB — PSA SERPL-MCNC: <0.1 NG/ML (ref 0–4)

## 2020-12-02 PROCEDURE — 84153 ASSAY OF PSA TOTAL: CPT

## 2020-12-03 ENCOUNTER — OFFICE VISIT (OUTPATIENT)
Dept: UROLOGY | Facility: MEDICAL CENTER | Age: 65
End: 2020-12-03
Payer: COMMERCIAL

## 2020-12-03 VITALS
WEIGHT: 190 LBS | HEIGHT: 74 IN | DIASTOLIC BLOOD PRESSURE: 82 MMHG | SYSTOLIC BLOOD PRESSURE: 136 MMHG | BODY MASS INDEX: 24.38 KG/M2

## 2020-12-03 DIAGNOSIS — Z85.46 HISTORY OF PROSTATE CANCER: Primary | ICD-10-CM

## 2020-12-03 DIAGNOSIS — N52.8 MIXED ERECTILE DYSFUNCTION: ICD-10-CM

## 2020-12-03 PROCEDURE — 3075F SYST BP GE 130 - 139MM HG: CPT | Performed by: UROLOGY

## 2020-12-03 PROCEDURE — 3079F DIAST BP 80-89 MM HG: CPT | Performed by: UROLOGY

## 2020-12-03 PROCEDURE — 99214 OFFICE O/P EST MOD 30 MIN: CPT | Performed by: UROLOGY

## 2020-12-09 ENCOUNTER — OFFICE VISIT (OUTPATIENT)
Dept: PODIATRY | Facility: CLINIC | Age: 65
End: 2020-12-09
Payer: COMMERCIAL

## 2020-12-09 VITALS — BODY MASS INDEX: 25.36 KG/M2 | WEIGHT: 197.6 LBS | HEIGHT: 74 IN

## 2020-12-09 DIAGNOSIS — M20.22 HALLUX RIGIDUS OF LEFT FOOT: ICD-10-CM

## 2020-12-09 DIAGNOSIS — M20.21 HALLUX RIGIDUS OF RIGHT FOOT: Primary | ICD-10-CM

## 2020-12-09 PROCEDURE — 3008F BODY MASS INDEX DOCD: CPT | Performed by: PODIATRIST

## 2020-12-09 PROCEDURE — 1036F TOBACCO NON-USER: CPT | Performed by: PODIATRIST

## 2020-12-09 PROCEDURE — 99213 OFFICE O/P EST LOW 20 MIN: CPT | Performed by: PODIATRIST

## 2020-12-09 PROCEDURE — 20550 NJX 1 TENDON SHEATH/LIGAMENT: CPT | Performed by: PODIATRIST

## 2020-12-09 RX ORDER — TRIAMCINOLONE ACETONIDE 40 MG/ML
20 INJECTION, SUSPENSION INTRA-ARTICULAR; INTRAMUSCULAR ONCE
Status: COMPLETED | OUTPATIENT
Start: 2020-12-09 | End: 2020-12-09

## 2020-12-09 RX ORDER — LIDOCAINE HYDROCHLORIDE 10 MG/ML
1 INJECTION, SOLUTION EPIDURAL; INFILTRATION; INTRACAUDAL; PERINEURAL ONCE
Status: COMPLETED | OUTPATIENT
Start: 2020-12-09 | End: 2020-12-09

## 2020-12-09 RX ADMIN — TRIAMCINOLONE ACETONIDE 20 MG: 40 INJECTION, SUSPENSION INTRA-ARTICULAR; INTRAMUSCULAR at 08:45

## 2020-12-09 RX ADMIN — LIDOCAINE HYDROCHLORIDE 1 ML: 10 INJECTION, SOLUTION EPIDURAL; INFILTRATION; INTRACAUDAL; PERINEURAL at 08:44

## 2020-12-15 DIAGNOSIS — I10 ESSENTIAL HYPERTENSION: ICD-10-CM

## 2020-12-15 RX ORDER — LISINOPRIL 5 MG/1
TABLET ORAL
Qty: 90 TABLET | Refills: 1 | Status: SHIPPED | OUTPATIENT
Start: 2020-12-15 | End: 2020-12-23 | Stop reason: SDUPTHER

## 2020-12-17 LAB
ALBUMIN SERPL-MCNC: 4.8 G/DL (ref 3.6–5.1)
ALBUMIN/GLOB SERPL: 1.9 (CALC) (ref 1–2.5)
ALP SERPL-CCNC: 68 U/L (ref 35–144)
ALT SERPL-CCNC: 32 U/L (ref 9–46)
AST SERPL-CCNC: 21 U/L (ref 10–35)
BILIRUB SERPL-MCNC: 0.6 MG/DL (ref 0.2–1.2)
BUN SERPL-MCNC: 22 MG/DL (ref 7–25)
BUN/CREAT SERPL: NORMAL (CALC) (ref 6–22)
CALCIUM SERPL-MCNC: 9.8 MG/DL (ref 8.6–10.3)
CHLORIDE SERPL-SCNC: 98 MMOL/L (ref 98–110)
CHOLEST SERPL-MCNC: 208 MG/DL
CHOLEST/HDLC SERPL: 2.9 (CALC)
CO2 SERPL-SCNC: 30 MMOL/L (ref 20–32)
CREAT SERPL-MCNC: 1.09 MG/DL (ref 0.7–1.25)
GLOBULIN SER CALC-MCNC: 2.5 G/DL (CALC) (ref 1.9–3.7)
GLUCOSE SERPL-MCNC: 98 MG/DL (ref 65–99)
HDLC SERPL-MCNC: 72 MG/DL
LDLC SERPL CALC-MCNC: 119 MG/DL (CALC)
NONHDLC SERPL-MCNC: 136 MG/DL (CALC)
POTASSIUM SERPL-SCNC: 4.2 MMOL/L (ref 3.5–5.3)
PROT SERPL-MCNC: 7.3 G/DL (ref 6.1–8.1)
SL AMB EGFR AFRICAN AMERICAN: 83 ML/MIN/1.73M2
SL AMB EGFR NON AFRICAN AMERICAN: 72 ML/MIN/1.73M2
SODIUM SERPL-SCNC: 136 MMOL/L (ref 135–146)
TRIGL SERPL-MCNC: 78 MG/DL
TSH SERPL-ACNC: 1.88 MIU/L (ref 0.4–4.5)

## 2020-12-21 ENCOUNTER — OFFICE VISIT (OUTPATIENT)
Dept: FAMILY MEDICINE CLINIC | Facility: CLINIC | Age: 65
End: 2020-12-21
Payer: COMMERCIAL

## 2020-12-21 VITALS
SYSTOLIC BLOOD PRESSURE: 126 MMHG | HEART RATE: 88 BPM | TEMPERATURE: 97.7 F | HEIGHT: 74 IN | OXYGEN SATURATION: 98 % | DIASTOLIC BLOOD PRESSURE: 82 MMHG | BODY MASS INDEX: 25 KG/M2 | WEIGHT: 194.8 LBS

## 2020-12-21 DIAGNOSIS — I10 ESSENTIAL HYPERTENSION: ICD-10-CM

## 2020-12-21 DIAGNOSIS — E78.2 MIXED HYPERLIPIDEMIA: ICD-10-CM

## 2020-12-21 DIAGNOSIS — M20.21 HALLUX RIGIDUS, BILATERAL: ICD-10-CM

## 2020-12-21 DIAGNOSIS — E78.5 HYPERLIPIDEMIA, UNSPECIFIED HYPERLIPIDEMIA TYPE: Primary | ICD-10-CM

## 2020-12-21 DIAGNOSIS — Z23 NEED FOR SHINGLES VACCINE: ICD-10-CM

## 2020-12-21 DIAGNOSIS — M20.22 HALLUX RIGIDUS, BILATERAL: ICD-10-CM

## 2020-12-21 DIAGNOSIS — C61 MALIGNANT NEOPLASM PROSTATE (HCC): ICD-10-CM

## 2020-12-21 DIAGNOSIS — E55.9 VITAMIN D DEFICIENCY: ICD-10-CM

## 2020-12-21 PROBLEM — Z85.46 HISTORY OF PROSTATE CANCER: Status: RESOLVED | Noted: 2020-12-03 | Resolved: 2020-12-21

## 2020-12-21 PROBLEM — R97.20 ELEVATED PSA: Status: RESOLVED | Noted: 2018-06-01 | Resolved: 2020-12-21

## 2020-12-21 PROCEDURE — 3008F BODY MASS INDEX DOCD: CPT | Performed by: FAMILY MEDICINE

## 2020-12-21 PROCEDURE — 90471 IMMUNIZATION ADMIN: CPT | Performed by: FAMILY MEDICINE

## 2020-12-21 PROCEDURE — 90750 HZV VACC RECOMBINANT IM: CPT | Performed by: FAMILY MEDICINE

## 2020-12-21 PROCEDURE — 3725F SCREEN DEPRESSION PERFORMED: CPT | Performed by: FAMILY MEDICINE

## 2020-12-21 PROCEDURE — 3074F SYST BP LT 130 MM HG: CPT | Performed by: FAMILY MEDICINE

## 2020-12-21 PROCEDURE — 99214 OFFICE O/P EST MOD 30 MIN: CPT | Performed by: FAMILY MEDICINE

## 2020-12-21 PROCEDURE — 1036F TOBACCO NON-USER: CPT | Performed by: FAMILY MEDICINE

## 2020-12-21 PROCEDURE — 3079F DIAST BP 80-89 MM HG: CPT | Performed by: FAMILY MEDICINE

## 2020-12-23 DIAGNOSIS — I10 ESSENTIAL HYPERTENSION: ICD-10-CM

## 2020-12-23 RX ORDER — LISINOPRIL 5 MG/1
5 TABLET ORAL DAILY
Qty: 90 TABLET | Refills: 1 | Status: SHIPPED | OUTPATIENT
Start: 2020-12-23 | End: 2021-09-17

## 2021-03-18 ENCOUNTER — OFFICE VISIT (OUTPATIENT)
Dept: FAMILY MEDICINE CLINIC | Facility: CLINIC | Age: 66
End: 2021-03-18
Payer: COMMERCIAL

## 2021-03-18 VITALS
TEMPERATURE: 98 F | DIASTOLIC BLOOD PRESSURE: 68 MMHG | SYSTOLIC BLOOD PRESSURE: 110 MMHG | BODY MASS INDEX: 25.31 KG/M2 | OXYGEN SATURATION: 98 % | HEIGHT: 74 IN | HEART RATE: 94 BPM | WEIGHT: 197.2 LBS

## 2021-03-18 DIAGNOSIS — R68.83 CHILLS: ICD-10-CM

## 2021-03-18 DIAGNOSIS — R20.2 PARESTHESIAS: Primary | ICD-10-CM

## 2021-03-18 DIAGNOSIS — B34.9 VIRAL SYNDROME: ICD-10-CM

## 2021-03-18 PROCEDURE — 99213 OFFICE O/P EST LOW 20 MIN: CPT | Performed by: FAMILY MEDICINE

## 2021-03-18 RX ORDER — VALACYCLOVIR HYDROCHLORIDE 1 G/1
1000 TABLET, FILM COATED ORAL 3 TIMES DAILY
Qty: 21 TABLET | Refills: 0 | Status: SHIPPED | OUTPATIENT
Start: 2021-03-18 | End: 2021-06-21 | Stop reason: ALTCHOICE

## 2021-03-18 NOTE — PROGRESS NOTES
Assessment/Plan:   patient is 79-year-old male with abnormal sensation on upper body both left and right sides  He thought it might feel like shingles but he has no rash  I did explain that shingles generally is just on 1 side of the body because it follows a nerve root  I did start the acyclovir though  I also asked him to get blood work to check a CBC, Lyme and C-reactive protein  He does have a circular erythematous rash in his right groin area and this appears to be fungal   I told him that he could get over-the-counter Lotrimin or Nizoral        Diagnoses and all orders for this visit:    Paresthesias  -     CBC and differential; Future  -     Lyme Antibody Profile with reflex to WB; Future  -     C-reactive protein; Future  -     Comprehensive metabolic panel; Future  -     valACYclovir (VALTREX) 1,000 mg tablet; Take 1 tablet (1,000 mg total) by mouth 3 (three) times a day for 7 days  -     CBC and differential  -     Lyme Antibody Profile with reflex to WB  -     C-reactive protein  -     Comprehensive metabolic panel    Chills  -     CBC and differential; Future  -     Lyme Antibody Profile with reflex to WB; Future  -     C-reactive protein; Future  -     Comprehensive metabolic panel; Future  -     valACYclovir (VALTREX) 1,000 mg tablet; Take 1 tablet (1,000 mg total) by mouth 3 (three) times a day for 7 days  -     CBC and differential  -     Lyme Antibody Profile with reflex to WB  -     C-reactive protein  -     Comprehensive metabolic panel    Viral syndrome          Subjective:   Chief Complaint   Patient presents with    Rash     Tingling on back and chest         Patient ID: Kulwinder Mcneill is a 63 y o  male  Tingling sensation on arms and hands, then moved to chest and back - both right and left  Feels like something crawling  No rash on upper body  He does have a his in the right groin     Had chills off and on last night and this AM   History of radiculopathy from cervical spine but this is different  The following portions of the patient's history were reviewed and updated as appropriate: allergies, current medications, past family history, past medical history, past social history, past surgical history and problem list     Review of Systems   Constitutional: Positive for chills  Negative for fever  HENT: Negative  Respiratory: Negative  Cardiovascular: Negative  Gastrointestinal: Negative  Musculoskeletal: Negative for gait problem and neck pain  Skin: Positive for rash ( right groin)  Neurological: Negative for tremors and speech difficulty  As in HPI, abnormal sensation in upper body  Objective:      /68 (BP Location: Right arm, Patient Position: Sitting, Cuff Size: Standard)   Pulse 94   Temp 98 °F (36 7 °C) (Temporal)   Ht 6' 2" (1 88 m)   Wt 89 4 kg (197 lb 3 2 oz)   SpO2 98%   BMI 25 32 kg/m²          Physical Exam  Vitals signs and nursing note reviewed  Constitutional:       Appearance: Normal appearance  HENT:      Head: Normocephalic  Skin:     General: Skin is warm and dry  Findings: Rash (  Right groin, erythematous circular rash ) present  Comments:   No abnormality of skin on upper body  Neurological:      Mental Status: He is alert and oriented to person, place, and time  Sensory: No sensory deficit     Psychiatric:         Mood and Affect: Mood normal

## 2021-03-19 LAB
ALBUMIN SERPL-MCNC: 4.3 G/DL (ref 3.6–5.1)
ALBUMIN/GLOB SERPL: 1.7 (CALC) (ref 1–2.5)
ALP SERPL-CCNC: 67 U/L (ref 35–144)
ALT SERPL-CCNC: 36 U/L (ref 9–46)
AST SERPL-CCNC: 26 U/L (ref 10–35)
B BURGDOR AB SER IA-ACNC: <0.9 INDEX
BASOPHILS # BLD AUTO: 31 CELLS/UL (ref 0–200)
BASOPHILS NFR BLD AUTO: 0.6 %
BILIRUB SERPL-MCNC: 0.6 MG/DL (ref 0.2–1.2)
BUN SERPL-MCNC: 15 MG/DL (ref 7–25)
BUN/CREAT SERPL: NORMAL (CALC) (ref 6–22)
CALCIUM SERPL-MCNC: 9.3 MG/DL (ref 8.6–10.3)
CHLORIDE SERPL-SCNC: 100 MMOL/L (ref 98–110)
CO2 SERPL-SCNC: 29 MMOL/L (ref 20–32)
CREAT SERPL-MCNC: 1.04 MG/DL (ref 0.7–1.25)
CRP SERPL-MCNC: 2.8 MG/L
EOSINOPHIL # BLD AUTO: 99 CELLS/UL (ref 15–500)
EOSINOPHIL NFR BLD AUTO: 1.9 %
ERYTHROCYTE [DISTWIDTH] IN BLOOD BY AUTOMATED COUNT: 13.2 % (ref 11–15)
GLOBULIN SER CALC-MCNC: 2.6 G/DL (CALC) (ref 1.9–3.7)
GLUCOSE SERPL-MCNC: 90 MG/DL (ref 65–99)
HCT VFR BLD AUTO: 44.9 % (ref 38.5–50)
HGB BLD-MCNC: 15.2 G/DL (ref 13.2–17.1)
LYMPHOCYTES # BLD AUTO: 1414 CELLS/UL (ref 850–3900)
LYMPHOCYTES NFR BLD AUTO: 27.2 %
MCH RBC QN AUTO: 31.7 PG (ref 27–33)
MCHC RBC AUTO-ENTMCNC: 33.9 G/DL (ref 32–36)
MCV RBC AUTO: 93.7 FL (ref 80–100)
MONOCYTES # BLD AUTO: 816 CELLS/UL (ref 200–950)
MONOCYTES NFR BLD AUTO: 15.7 %
NEUTROPHILS # BLD AUTO: 2839 CELLS/UL (ref 1500–7800)
NEUTROPHILS NFR BLD AUTO: 54.6 %
PLATELET # BLD AUTO: 179 THOUSAND/UL (ref 140–400)
PMV BLD REES-ECKER: 10.9 FL (ref 7.5–12.5)
POTASSIUM SERPL-SCNC: 4.1 MMOL/L (ref 3.5–5.3)
PROT SERPL-MCNC: 6.9 G/DL (ref 6.1–8.1)
RBC # BLD AUTO: 4.79 MILLION/UL (ref 4.2–5.8)
SL AMB EGFR AFRICAN AMERICAN: 88 ML/MIN/1.73M2
SL AMB EGFR NON AFRICAN AMERICAN: 76 ML/MIN/1.73M2
SODIUM SERPL-SCNC: 137 MMOL/L (ref 135–146)
WBC # BLD AUTO: 5.2 THOUSAND/UL (ref 3.8–10.8)

## 2021-03-22 ENCOUNTER — TELEPHONE (OUTPATIENT)
Dept: FAMILY MEDICINE CLINIC | Facility: CLINIC | Age: 66
End: 2021-03-22

## 2021-03-22 DIAGNOSIS — R68.83 CHILLS: ICD-10-CM

## 2021-03-22 DIAGNOSIS — R52 BODY ACHES: Primary | ICD-10-CM

## 2021-03-22 DIAGNOSIS — B34.9 VIRAL INFECTION, UNSPECIFIED: ICD-10-CM

## 2021-03-22 DIAGNOSIS — R51.9 ACUTE NONINTRACTABLE HEADACHE, UNSPECIFIED HEADACHE TYPE: ICD-10-CM

## 2021-03-22 DIAGNOSIS — R52 BODY ACHES: ICD-10-CM

## 2021-03-22 PROCEDURE — U0005 INFEC AGEN DETEC AMPLI PROBE: HCPCS | Performed by: FAMILY MEDICINE

## 2021-03-22 PROCEDURE — U0003 INFECTIOUS AGENT DETECTION BY NUCLEIC ACID (DNA OR RNA); SEVERE ACUTE RESPIRATORY SYNDROME CORONAVIRUS 2 (SARS-COV-2) (CORONAVIRUS DISEASE [COVID-19]), AMPLIFIED PROBE TECHNIQUE, MAKING USE OF HIGH THROUGHPUT TECHNOLOGIES AS DESCRIBED BY CMS-2020-01-R: HCPCS | Performed by: FAMILY MEDICINE

## 2021-03-22 NOTE — TELEPHONE ENCOUNTER
I spoke with patient on Saturday after I reviewed his blood work  I had seen he said this email, but since it was Saturday it had not yet been assigned to me  I had recommended he go to the ER but he did not want to do this and was going to take Advil for his pain ----- Message from Be Antonio LPN sent at 3/76/0192  7:53 AM EDT -----  Regarding: FW: Visit Follow-Up Question  Contact: [phone]    ----- Message -----  From: Jennie Melton  Sent: 3/20/2021   9:18 AM EDT  To: [facility]  Subject: Visit Follow-Up Question                         I also left a VM on answering machines  My pain is now rather significant and upper legs, hips etc  besides torso    not just the tingling and waking me up 4AM etc   What can I take for this now painful condition? So far, six (6) Valtrex taken   Pharma is [facility]  [phone]

## 2021-03-23 ENCOUNTER — TELEMEDICINE (OUTPATIENT)
Dept: FAMILY MEDICINE CLINIC | Facility: CLINIC | Age: 66
End: 2021-03-23
Payer: COMMERCIAL

## 2021-03-23 ENCOUNTER — TELEPHONE (OUTPATIENT)
Dept: FAMILY MEDICINE CLINIC | Facility: CLINIC | Age: 66
End: 2021-03-23

## 2021-03-23 DIAGNOSIS — U07.1 COVID-19: Primary | ICD-10-CM

## 2021-03-23 LAB — SARS-COV-2 RNA RESP QL NAA+PROBE: POSITIVE

## 2021-03-23 PROCEDURE — 99213 OFFICE O/P EST LOW 20 MIN: CPT | Performed by: FAMILY MEDICINE

## 2021-03-23 RX ORDER — SODIUM CHLORIDE 9 MG/ML
20 INJECTION, SOLUTION INTRAVENOUS ONCE
Status: CANCELLED | OUTPATIENT
Start: 2021-03-25

## 2021-03-23 RX ORDER — ACETAMINOPHEN 325 MG/1
650 TABLET ORAL ONCE AS NEEDED
Status: CANCELLED | OUTPATIENT
Start: 2021-03-25

## 2021-03-23 RX ORDER — ALBUTEROL SULFATE 90 UG/1
3 AEROSOL, METERED RESPIRATORY (INHALATION) ONCE AS NEEDED
Status: CANCELLED | OUTPATIENT
Start: 2021-03-25

## 2021-03-23 NOTE — TELEPHONE ENCOUNTER
Received call from pt  He stated he has been in contact with you in regards to his symptoms  Pt tested positive for covid 3/22  Pt stated you put him on Valtrex  Pt stated since he did test positive for covid should he stop this medication? Dr Hayden Piña stated if he does not have a rash he can stop the medication at this time  Pt wants to know if this is OK? Pt also wants to complete his virtual COVID follow up appts for himself and wife with you  You only have same day slots available for tomorrow    OK to override? Please advise

## 2021-03-23 NOTE — PROGRESS NOTES
COVID-19 Virtual Visit     Assessment/Plan:    Problem List Items Addressed This Visit        Other    COVID-19 - Primary         Disposition:     I recommended continued isolation until at least 24 hours have passed since recovery defined as resolution of fever without the use of fever-reducing medications AND improvement in COVID symptoms AND 10 days have passed since onset of symptoms (or 10 days have passed since date of first positive viral diagnostic test for asymptomatic patients)  Patient will continue to isolate  He will be scheduled for monoclonal antibody infusion  It is difficult to say when his symptoms started  I am not sure that his symptoms of numbness last week were related to COVID  Certainly his symptoms over the weekend with fever, chills and cough are consistent with COVID  He will be rechecked on the day following his infusion  Patient meets criteria for Casirivimab/Imdevimab infusion  They were counseled in regards to risks, benefits, and side effects of this infusion  Casirivimab and imdevimab are investigational medicines used to treat mild to moderate symptoms of COVID-19 in non-hospitalized adults and adolescents (15years of age and older who weigh at least 80 pounds (40 kg)), and who are at high risk for developing severe COVID-19 symptoms or the need for hospitalization  Casirivimab and imdevimab are investigational because they are still being studied  There is limited information known about the safety and effectiveness of using casirivimab and imdevimab to treat people with COVID-19  The FDA has authorized the emergency use of casirivimab and imdevimab for the treatment of COVID-19 under an Emergency Use Authorization (EUA)  Possible side effects of casirivimab and imdevimab: Allergic reactions can happen during and after infusion with casirivimab and imdevimab      Possible reactions include: fever, chills, nausea, headache, shortness of breath, low blood pressure, wheezing, swelling of your lips, face, or throat, rash including hives, itching, muscle aches, and dizziness  The side effects of getting any medicine by vein may include brief pain, bleeding, bruising of the skin, soreness, swelling, and possible infection at the infusion site  These are not all the possible side effects of casirivimab and imdevimab  Not a lot of people have been given casirivimab and imdevimab  Serious and unexpected side effects may happen  Casirivimab and imdevimab are still being studied so it is possible that all of the risks are not known at this time  It is possible that casirivimab and imdevimab could interfere with your body's own ability to fight off a future infection of SARS-CoV-2  Similarly, casirivimab and imdevimab may reduce your body's immune response to a vaccine for SARS-CoV-2  Specific studies have not been conducted to address these possible risks  Emergency Use Authorization:    The [facility] FDA has made casirivimab and imdevimab available under an emergency access mechanism called an EUA  The EUA is supported by a  of Health and Human Service (HHS) declaration that circumstances exist to justify the emergency use of drugs and biological products during the COVID-19 pandemic  Casirivimab and imdevimab have not undergone the same type of review as an FDA-approved or cleared product  The FDA may issue an EUA when certain criteria are met, which includes that there are no adequate, approved, available alternatives  In addition, the FDA decision is based on the totality of scientific evidence available showing that it is reasonable to believe that the product meets certain criteria for safety, performance, and labeling and may be effective in treatment of patients during the COVID-19 pandemic  All of these criteria must be met to allow for the product to be used in the treatment of patients during the COVID-19 pandemic       The EUA for casirivimab and imdevimab is in effect for the duration of the COVID-19 declaration justifying emergency use of these products, unless terminated or revoked (after which the products may no longer be used)  Regarding COVID-19 Vaccination:    Currently there is no data or safety or efficacy of COVID-19 vaccination in persons who received monoclonal antibodies as part of COVID-19 treatment  Treatment should be deferred for at least 90 days to avoid interference of the treatment with vaccine-induced immune responses (this is based on estimated half-life of therapies and evidence suggesting reinfection is uncommon within 90 days of initial infection)  The patient consents to proceed with casirivimab and imdevimab infusion  I have spent 10 minutes directly with the patient  Encounter provider Meenakshi Busby DO    Provider located at [place] [address]  [address] [facility] [address] [facility] [address] [phone]    Recent Visits  Date Type Provider Dept   03/23/21 PHYLLIS/ Dany 67, [address] [facility]   03/23/21 Telephone Alisha Thomson [facility]   03/22/21 Telephone Meenakshi Busby DO [facility]   03/18/21 Office Visit Meenakshi Busby DO [facility]   Showing recent visits within past 7 days and meeting all other requirements     Future Appointments  No visits were found meeting these conditions  Showing future appointments within next 150 days and meeting all other requirements      This virtual check-in was done via [employer] and patient was informed that this is a secure, HIPAA-compliant platform  He agrees to proceed  Patient agrees to participate in a virtual check in via telephone or video visit instead of presenting to the office to address urgent/immediate medical needs  Patient is aware this is a billable service  After connecting through [facility], the patient was identified by name and date of birth   Beka Rodriguez Olimpia Spaulding was informed that this was a telemedicine visit and that the exam was being conducted confidentially over secure lines  My office door was closed  No one else was in the room  Autumn Macdonald acknowledged consent and understanding of privacy and security of the telemedicine visit  I informed the patient that I have reviewed his record in Epic and presented the opportunity for him to ask any questions regarding the visit today  The patient agreed to participate  Subjective:   Autumn Macdonald is a 63 y o  male who has been screened for COVID-19  Symptom change since last report: worsening  Patient's symptoms include fever, chills, fatigue, malaise, cough and headache  Patient denies congestion, rhinorrhea, sore throat, anosmia, loss of taste, shortness of breath, chest tightness, abdominal pain, nausea, vomiting, diarrhea and myalgias  Eren Bojorquez has been staying home and has isolated themselves in his home  He is taking care to not share personal items and is cleaning all surfaces that are touched often, like counters, tabletops, and doorknobs using household cleaning sprays or wipes  He is wearing a mask when he leaves his room  Date of positive COVID-19 PCR: 3/22/2021      Patient was seen on 03/18 with sensation in upper torso of numbness or crawling sensation  He thought he might be starting with shingles although it was on both sides  I did start him on valacyclovir  Over the weekend he developed increasing myalgias and had fever, chills and cough on 03/22  I sent him for a COVID test which was positive      Lab Results   Component Value Date    SARSCOV2 Positive (A) 03/22/2021     Past Medical History:   Diagnosis Date    Abnormal cardiovascular stress test     last assessed-11/13/2015    Abnormal electrocardiogram     last assessed-11/2/2015    Arthritis     Cancer [facility])     prostate    Elevated PSA     Encounter for screening colonoscopy for non-high-risk patient     family hx possible of colon ca    Environmental and seasonal allergies     Hallux rigidus     last assessed-4/24/2014    History of prostate cancer 12/3/2020    Hyperlipidemia     Hypertension     Irregular heart beat     "rapid heart beat episode X1" - tested - no issue    Organic impotence     last assessed-4/24/2014    Rapid pulse     resolved-1/19/2016    Tachycardia     last assessed-11/2/2015    Wears glasses      Past Surgical History:   Procedure Laterality Date    APPENDECTOMY      BACK SURGERY      L4-L5    COLONOSCOPY      X 3-4     HERNIA REPAIR      OTHER SURGICAL HISTORY      spinal diskectomy    MS COLONOSCOPY FLX DX W/COLLJ SPEC WHEN PFRMD N/A 9/30/2016    Procedure: COLONOSCOPY;  Surgeon: Ginette Isabel MD;  Location: [facility];   Service: Colorectal    MS LAP,APPENDECTOMY N/A 12/21/2019    Procedure: APPENDECTOMY LAPAROSCOPIC;  Surgeon: Nahid Washington MD;  [facility];  Service: General    MS LAP,PROSTATECTOMY,RADICAL,W/NERVE SPARE,INCL ROBOTIC N/A 10/29/2018    Procedure: PROSTATECTOMY RADICAL W ROBOTICS; BILATERAL PELVIC LYMPH NODE DISSECTION; LYSIS OF ADHESIONS;  Surgeon: Kathleen Maradiaga MD;  [facility];  Service: Urology    MS REPAIR ING HERNIA,5+Y/O,REDUCIBL Left 1/10/2020    Procedure: LAPAROSCOPIC INGUINAL HERNIA REPAIR WITH MESH;  Surgeon: Nahid Washington MD;  [facility];  Service: General    PROSTATE BIOPSY Bilateral 08/2018    REDUCTION OF TORSION OF TESTIS  2001    VASECTOMY      WISDOM TOOTH EXTRACTION       Current Outpatient Medications   Medication Sig Dispense Refill    aspirin (ECOTRIN LOW STRENGTH) 81 mg EC tablet Take 81 mg by mouth daily      ezetimibe-simvastatin (VYTORIN) 10-10 mg per tablet TAKE 1 TABLET DAILY 90 tablet 1    lisinopril (ZESTRIL) 5 mg tablet Take 1 tablet (5 mg total) by mouth daily 90 tablet 1    valACYclovir (VALTREX) 1,000 mg tablet Take 1 tablet (1,000 mg total) by mouth 3 (three) times a day for 7 days 21 tablet 0     No current facility-administered medications for this visit  Allergies   Allergen Reactions    Lactose GI Intolerance       Review of Systems   Constitutional: Positive for chills, fatigue and fever  HENT: Negative for congestion, rhinorrhea and sore throat  Respiratory: Positive for cough  Negative for chest tightness and shortness of breath  Gastrointestinal: Negative for abdominal pain, diarrhea, nausea and vomiting  Musculoskeletal: Negative for myalgias  Neurological: Positive for headaches  Objective: There were no vitals filed for this visit  Physical Exam  Constitutional:       General: He is not in acute distress  Appearance: He is ill-appearing  HENT:      Head: Normocephalic and atraumatic  Pulmonary:      Effort: Pulmonary effort is normal  No respiratory distress  Musculoskeletal:      Comments: Patient is ambulating  He was in bed when I called him but did get up to go get his wife for the call  Skin:     Coloration: Skin is not pale  Neurological:      Mental Status: He is alert and oriented to person, place, and time  Psychiatric:         Mood and Affect: Mood normal        VIRTUAL VISIT DISCLAIMER    Tarik Nolan acknowledges that he has consented to an online visit or consultation  He understands that the online visit is based solely on information provided by him, and that, in the absence of a face-to-face physical evaluation by the physician, the diagnosis he receives is both limited and provisional in terms of accuracy and completeness  This is not intended to replace a full medical face-to-face evaluation by the physician  Tarik Nolan understands and accepts these terms

## 2021-03-25 ENCOUNTER — HOSPITAL ENCOUNTER (OUTPATIENT)
Dept: INFUSION CENTER | Facility: HOSPITAL | Age: 66
Discharge: HOME | End: 2021-03-25
Payer: COMMERCIAL

## 2021-03-25 VITALS
RESPIRATION RATE: 16 BRPM | HEART RATE: 89 BPM | OXYGEN SATURATION: 97 % | TEMPERATURE: 98.8 F | SYSTOLIC BLOOD PRESSURE: 136 MMHG | DIASTOLIC BLOOD PRESSURE: 72 MMHG

## 2021-03-25 DIAGNOSIS — U07.1 COVID-19: Primary | ICD-10-CM

## 2021-03-25 PROCEDURE — M0243 CASIRIVI AND IMDEVI INFUSION: HCPCS | Performed by: FAMILY MEDICINE

## 2021-03-25 RX ORDER — ACETAMINOPHEN 325 MG/1
650 TABLET ORAL ONCE AS NEEDED
Status: DISCONTINUED | OUTPATIENT
Start: 2021-03-25 | End: 2021-03-28 | Stop reason: HOSPADM

## 2021-03-25 RX ORDER — ALBUTEROL SULFATE 90 UG/1
3 AEROSOL, METERED RESPIRATORY (INHALATION) ONCE AS NEEDED
Status: DISCONTINUED | OUTPATIENT
Start: 2021-03-25 | End: 2021-03-28 | Stop reason: HOSPADM

## 2021-03-25 RX ORDER — SODIUM CHLORIDE 9 MG/ML
20 INJECTION, SOLUTION INTRAVENOUS ONCE
Status: CANCELLED | OUTPATIENT
Start: 2021-03-25

## 2021-03-25 RX ORDER — ACETAMINOPHEN 325 MG/1
650 TABLET ORAL ONCE AS NEEDED
Status: CANCELLED | OUTPATIENT
Start: 2021-03-25

## 2021-03-25 RX ORDER — ALBUTEROL SULFATE 90 UG/1
3 AEROSOL, METERED RESPIRATORY (INHALATION) ONCE AS NEEDED
Status: CANCELLED | OUTPATIENT
Start: 2021-03-25

## 2021-03-25 RX ORDER — SODIUM CHLORIDE 9 MG/ML
20 INJECTION, SOLUTION INTRAVENOUS ONCE
Status: COMPLETED | OUTPATIENT
Start: 2021-03-25 | End: 2021-03-25

## 2021-03-25 RX ADMIN — IMDEVIMAB: 300 INJECTION, SOLUTION, CONCENTRATE INTRAVENOUS at 10:01

## 2021-03-25 RX ADMIN — SODIUM CHLORIDE 20 ML/HR: 0.9 INJECTION, SOLUTION INTRAVENOUS at 09:53

## 2021-03-25 NOTE — PROGRESS NOTES
Patient's infusion ended without incident  Patient tolerated infusion well  Observation period started  Vital signs stable  Will continue to monitor

## 2021-03-25 NOTE — PLAN OF CARE
Problem: PAIN - ADULT  Goal: Verbalizes/displays adequate comfort level or baseline comfort level  Description: Interventions:  - Encourage patient to monitor pain and request assistance  - Assess pain using appropriate pain scale  - Administer analgesics based on type and severity of pain and evaluate response  - Implement non-pharmacological measures as appropriate and evaluate response  - Consider cultural and social influences on pain and pain management  - Notify physician/advanced practitioner if interventions unsuccessful or patient reports new pain  Outcome: Progressing     Problem: INFECTION - ADULT  Goal: Absence or prevention of progression during hospitalization  Description: INTERVENTIONS:  - Assess and monitor for signs and symptoms of infection  - Monitor lab/diagnostic results  - Monitor all insertion sites, i e  indwelling lines, tubes, and drains  - Monitor endotracheal if appropriate and nasal secretions for changes in amount and color  - [place] appropriate cooling/warming therapies per order  - Administer medications as ordered  - Instruct and encourage patient and family to use good hand hygiene technique  - Identify and instruct in appropriate isolation precautions for identified infection/condition  Outcome: Progressing  Goal: Absence of fever/infection during neutropenic period  Description: INTERVENTIONS:  - Monitor WBC    Outcome: Progressing     Problem: SAFETY ADULT  Goal: Patient will remain free of falls  Description: INTERVENTIONS:  - Assess patient frequently for physical needs  -  Identify cognitive and physical deficits and behaviors that affect risk of falls    -  [place] fall precautions as indicated by assessment   - Educate patient/family on patient safety including physical limitations  - Instruct patient to call for assistance with activity based on assessment  - Modify environment to reduce risk of injury  - Consider OT/PT consult to assist with strengthening/mobility  Outcome: Progressing  Goal: Maintain or return to baseline ADL function  Description: INTERVENTIONS:  -  Assess patient's ability to carry out ADLs; assess patient's baseline for ADL function and identify physical deficits which impact ability to perform ADLs (bathing, care of mouth/teeth, toileting, grooming, dressing, etc )  - Assess/evaluate cause of self-care deficits   - Assess range of motion  - Assess patient's mobility; develop plan if impaired  - Assess patient's need for assistive devices and provide as appropriate  - Encourage maximum independence but intervene and supervise when necessary  - Involve family in performance of ADLs  - Assess for home care needs following discharge   - Consider OT consult to assist with ADL evaluation and planning for discharge  - Provide patient education as appropriate  Outcome: Progressing  Goal: Maintain or return mobility status to optimal level  Description: INTERVENTIONS:  - Assess patient's baseline mobility status (ambulation, transfers, stairs, etc )    - Identify cognitive and physical deficits and behaviors that affect mobility  - Identify mobility aids required to assist with transfers and/or ambulation (gait belt, sit-to-stand, lift, walker, cane, etc )  - [place] fall precautions as indicated by assessment  - Record patient progress and toleration of activity level on Mobility SBAR; progress patient to next Phase/Stage  - Instruct patient to call for assistance with activity based on assessment  - Consider rehabilitation consult to assist with strengthening/weightbearing, etc   Outcome: Progressing     Problem: DISCHARGE PLANNING  Goal: Discharge to home or other facility with appropriate resources  Description: INTERVENTIONS:  - Identify barriers to discharge w/patient and caregiver  - Arrange for needed discharge resources and transportation as appropriate  - Identify discharge learning needs (meds, wound care, etc )  - Arrange for interpretive services to assist at discharge as needed  - Refer to Case Management Department for coordinating discharge planning if the patient needs post-hospital services based on physician/advanced practitioner order or complex needs related to functional status, cognitive ability, or social support system  Outcome: Progressing     Problem: Knowledge Deficit  Goal: Patient/family/caregiver demonstrates understanding of disease process, treatment plan, medications, and discharge instructions  Description: Complete learning assessment and assess knowledge base    Interventions:  - Provide teaching at level of understanding  - Provide teaching via preferred learning methods  Outcome: Progressing

## 2021-03-26 ENCOUNTER — TELEMEDICINE (OUTPATIENT)
Dept: FAMILY MEDICINE CLINIC | Facility: CLINIC | Age: 66
End: 2021-03-26
Payer: COMMERCIAL

## 2021-03-26 DIAGNOSIS — U07.1 COVID-19: Primary | ICD-10-CM

## 2021-03-26 PROCEDURE — 99213 OFFICE O/P EST LOW 20 MIN: CPT | Performed by: FAMILY MEDICINE

## 2021-03-26 PROCEDURE — 1036F TOBACCO NON-USER: CPT | Performed by: FAMILY MEDICINE

## 2021-03-26 NOTE — PROGRESS NOTES
COVID-19 Virtual Visit     Assessment/Plan:    Problem List Items Addressed This Visit        Other    COVID-19 - Primary         Disposition:     I recommended continued isolation until at least 24 hours have passed since recovery defined as resolution of fever without the use of fever-reducing medications AND improvement in COVID symptoms AND 10 days have passed since onset of symptoms (or 10 days have passed since date of first positive viral diagnostic test for asymptomatic patients)  I have spent 10 minutes directly with the patient  Greater than 50% of this time was spent in counseling/coordination of care regarding: diagnostic results, prognosis, risks and benefits of treatment options, instructions for management, patient and family education, importance of treatment compliance and risk factor reductions  Encounter provider Hillary Perdomo DO    Provider located at [facility] [address]  [address] [id number] [phone]    Recent Visits  Date Type Provider Dept   03/23/21 PIOTR Saenz [address], [address] [facility]   03/23/21 Telephone Shaila Eldridge [facility]   03/22/21 Telephone DO [facility]   Showing recent visits within past 7 days and meeting all other requirements     Today's Visits  Date Type Provider Dept   03/26/21 Telemedicine DO [facility]   Showing today's visits and meeting all other requirements     Future Appointments  No visits were found meeting these conditions  Showing future appointments within next 150 days and meeting all other requirements      This virtual check-in was done via [employer] and patient was informed that this is a secure, HIPAA-compliant platform  He agrees to proceed  Patient agrees to participate in a virtual check in via telephone or video visit instead of presenting to the office to address urgent/immediate medical needs  Patient is aware this is a billable service  After connecting through [facility], the patient was identified by name and date of birth  Tad Roblero was informed that this was a telemedicine visit and that the exam was being conducted confidentially over secure lines  My office door was closed  No one else was in the room  Tad Roblero acknowledged consent and understanding of privacy and security of the telemedicine visit  I informed the patient that I have reviewed his record in Epic and presented the opportunity for him to ask any questions regarding the visit today  The patient agreed to participate  Subjective:   Tad Roblero is a 63 y o  male who has been screened for COVID-19  Symptom change since last report: resolving  Patient's symptoms include fatigue, malaise, cough and headache  Patient denies fever, chills, congestion, sore throat, shortness of breath, chest tightness, abdominal pain, nausea, diarrhea and myalgias  Geralene Render has been staying home and has isolated themselves in his home  He is taking care to not share personal items and is cleaning all surfaces that are touched often, like counters, tabletops, and doorknobs using household cleaning sprays or wipes  He is wearing a mask when he leaves his room       Date of symptom onset: 3/18/2021  Date of positive COVID-19 PCR: 3/22/2021    Monoclonal Antibody Follow-up Symptom Questionnaire  I feel overall: somewhat better  My breathing is: somewhat better  My fever is: better  My fatigue is: similar    Lab Results   Component Value Date    SARSCOV2 Positive (A) 03/22/2021     Past Medical History:   Diagnosis Date    Abnormal cardiovascular stress test     last assessed-11/13/2015    Abnormal electrocardiogram     last assessed-11/2/2015    Arthritis     Cancer [facility])     prostate    Elevated PSA     Encounter for screening colonoscopy for non-high-risk patient     family hx possible of colon ca    Environmental and seasonal allergies  Hallux rigidus     last assessed-4/24/2014    History of prostate cancer 12/3/2020    Hyperlipidemia     Hypertension     Irregular heart beat     "rapid heart beat episode X1" - tested - no issue    Organic impotence     last assessed-4/24/2014    Rapid pulse     resolved-1/19/2016    Tachycardia     last assessed-11/2/2015    Wears glasses      Past Surgical History:   Procedure Laterality Date    APPENDECTOMY      BACK SURGERY      L4-L5    COLONOSCOPY      X 3-4     HERNIA REPAIR      OTHER SURGICAL HISTORY      spinal diskectomy    SC COLONOSCOPY FLX DX W/COLLJ SPEC WHEN PFRMD N/A 9/30/2016    Procedure: COLONOSCOPY;  Surgeon: Mady Nicole MD;  [facility]: [facility];   Service: Colorectal    SC LAP,APPENDECTOMY N/A 12/21/2019    Procedure: APPENDECTOMY LAPAROSCOPIC;  Surgeon: Magno Higgins MD;  [facility];  Service: General    SC LAP,PROSTATECTOMY,RADICAL,W/NERVE SPARE,INCL ROBOTIC N/A 10/29/2018    Procedure: PROSTATECTOMY RADICAL W ROBOTICS; BILATERAL PELVIC LYMPH NODE DISSECTION; LYSIS OF ADHESIONS;  Surgeon: Bonifacio Steel MD;  [facility];  Service: Urology    SC REPAIR ING HERNIA,5+Y/O,REDUCIBL Left 1/10/2020    Procedure: LAPAROSCOPIC INGUINAL HERNIA REPAIR WITH MESH;  Surgeon: Magno Higgins MD;  [facility];  Service: General    PROSTATE BIOPSY Bilateral 08/2018    REDUCTION OF TORSION OF TESTIS  2001    VASECTOMY      WISDOM TOOTH EXTRACTION       Current Outpatient Medications   Medication Sig Dispense Refill    aspirin (ECOTRIN LOW STRENGTH) 81 mg EC tablet Take 81 mg by mouth daily      ezetimibe-simvastatin (VYTORIN) 10-10 mg per tablet TAKE 1 TABLET DAILY 90 tablet 1    lisinopril (ZESTRIL) 5 mg tablet Take 1 tablet (5 mg total) by mouth daily 90 tablet 1    valACYclovir (VALTREX) 1,000 mg tablet Take 1 tablet (1,000 mg total) by mouth 3 (three) times a day for 7 days 21 tablet 0     No current facility-administered medications for this visit  Facility-Administered Medications Ordered in Other Visits   Medication Dose Route Frequency Provider Last Rate Last Admin    acetaminophen (TYLENOL) tablet 650 mg  650 mg Oral Once PRN Francy Locket, DO        albuterol (PROVENTIL HFA,VENTOLIN HFA) inhaler 3 puff  3 puff Inhalation Once PRN Francy Locket, DO         Allergies   Allergen Reactions    Lactose GI Intolerance       Review of Systems   Constitutional: Positive for fatigue  Negative for activity change, chills and fever  HENT: Negative for congestion, ear pain, sinus pressure and sore throat  Eyes: Negative for redness, itching and visual disturbance  Respiratory: Positive for cough  Negative for chest tightness and shortness of breath  Cardiovascular: Negative for chest pain and palpitations  Gastrointestinal: Negative for abdominal pain, diarrhea and nausea  Endocrine: Negative for cold intolerance and heat intolerance  Genitourinary: Negative for dysuria, flank pain and frequency  Musculoskeletal: Negative for arthralgias, back pain, gait problem and myalgias  Skin: Negative for color change  Allergic/Immunologic: Negative for environmental allergies  Neurological: Positive for headaches  Negative for dizziness and numbness  Psychiatric/Behavioral: Negative for behavioral problems and sleep disturbance  Objective: There were no vitals filed for this visit  Physical Exam  Constitutional:       General: He is not in acute distress  Appearance: He is well-developed  He is not ill-appearing, toxic-appearing or diaphoretic  HENT:      Head: Normocephalic and atraumatic  Right Ear: Hearing normal       Left Ear: Hearing normal       Nose: Nose normal    Eyes:      General: Lids are normal       Conjunctiva/sclera: Conjunctivae normal       Right eye: Right conjunctiva is not injected  Left eye: Left conjunctiva is not injected  Pupils: Pupils are equal, round, and reactive to light     Neck: Musculoskeletal: Normal range of motion  Trachea: No tracheal deviation  Pulmonary:      Effort: Pulmonary effort is normal  No respiratory distress  Musculoskeletal: Normal range of motion  Skin:     Findings: No rash  Neurological:      Mental Status: He is alert and oriented to person, place, and time  Psychiatric:         Behavior: Behavior normal          Thought Content: Thought content normal          Judgment: Judgment normal        VIRTUAL VISIT DISCLAIMER    Denakaren Raines acknowledges that he has consented to an online visit or consultation  He understands that the online visit is based solely on information provided by him, and that, in the absence of a face-to-face physical evaluation by the physician, the diagnosis he receives is both limited and provisional in terms of accuracy and completeness  This is not intended to replace a full medical face-to-face evaluation by the physician  Dena Raines understands and accepts these terms

## 2021-03-30 DIAGNOSIS — Z23 ENCOUNTER FOR IMMUNIZATION: ICD-10-CM

## 2021-04-14 DIAGNOSIS — E78.2 MIXED HYPERLIPIDEMIA: ICD-10-CM

## 2021-04-14 RX ORDER — EZETIMIBE AND SIMVASTATIN 10; 10 MG/1; MG/1
TABLET ORAL
Qty: 90 TABLET | Refills: 1 | Status: SHIPPED | OUTPATIENT
Start: 2021-04-14 | End: 2021-09-21

## 2021-06-14 LAB
ALBUMIN SERPL-MCNC: 4.3 G/DL (ref 3.6–5.1)
ALBUMIN/GLOB SERPL: 1.7 (CALC) (ref 1–2.5)
ALP SERPL-CCNC: 56 U/L (ref 35–144)
ALT SERPL-CCNC: 23 U/L (ref 9–46)
AST SERPL-CCNC: 22 U/L (ref 10–35)
BILIRUB SERPL-MCNC: 0.8 MG/DL (ref 0.2–1.2)
BUN SERPL-MCNC: 22 MG/DL (ref 7–25)
BUN/CREAT SERPL: NORMAL (CALC) (ref 6–22)
CALCIUM SERPL-MCNC: 9.4 MG/DL (ref 8.6–10.3)
CHLORIDE SERPL-SCNC: 101 MMOL/L (ref 98–110)
CHOLEST SERPL-MCNC: 184 MG/DL
CHOLEST/HDLC SERPL: 2.8 (CALC)
CO2 SERPL-SCNC: 28 MMOL/L (ref 20–32)
CREAT SERPL-MCNC: 1.07 MG/DL (ref 0.7–1.25)
GLOBULIN SER CALC-MCNC: 2.5 G/DL (CALC) (ref 1.9–3.7)
GLUCOSE SERPL-MCNC: 97 MG/DL (ref 65–99)
HDLC SERPL-MCNC: 65 MG/DL
LDLC SERPL CALC-MCNC: 102 MG/DL (CALC)
NONHDLC SERPL-MCNC: 119 MG/DL (CALC)
POTASSIUM SERPL-SCNC: 4.3 MMOL/L (ref 3.5–5.3)
PROT SERPL-MCNC: 6.8 G/DL (ref 6.1–8.1)
SL AMB EGFR AFRICAN AMERICAN: 85 ML/MIN/1.73M2
SL AMB EGFR NON AFRICAN AMERICAN: 73 ML/MIN/1.73M2
SODIUM SERPL-SCNC: 137 MMOL/L (ref 135–146)
TRIGL SERPL-MCNC: 77 MG/DL
TSH SERPL-ACNC: 1.19 MIU/L (ref 0.4–4.5)

## 2021-06-21 ENCOUNTER — OFFICE VISIT (OUTPATIENT)
Dept: FAMILY MEDICINE CLINIC | Facility: CLINIC | Age: 66
End: 2021-06-21
Payer: COMMERCIAL

## 2021-06-21 VITALS
HEART RATE: 88 BPM | DIASTOLIC BLOOD PRESSURE: 84 MMHG | TEMPERATURE: 98.9 F | HEIGHT: 73 IN | BODY MASS INDEX: 25.69 KG/M2 | WEIGHT: 193.8 LBS | OXYGEN SATURATION: 97 % | SYSTOLIC BLOOD PRESSURE: 140 MMHG

## 2021-06-21 DIAGNOSIS — C61 MALIGNANT NEOPLASM PROSTATE (HCC): ICD-10-CM

## 2021-06-21 DIAGNOSIS — I10 ESSENTIAL HYPERTENSION: ICD-10-CM

## 2021-06-21 DIAGNOSIS — E78.5 HYPERLIPIDEMIA, UNSPECIFIED HYPERLIPIDEMIA TYPE: Primary | ICD-10-CM

## 2021-06-21 PROBLEM — U07.1 COVID-19: Status: RESOLVED | Noted: 2021-03-23 | Resolved: 2021-06-21

## 2021-06-21 PROCEDURE — 3725F SCREEN DEPRESSION PERFORMED: CPT | Performed by: FAMILY MEDICINE

## 2021-06-21 PROCEDURE — 1036F TOBACCO NON-USER: CPT | Performed by: FAMILY MEDICINE

## 2021-06-21 PROCEDURE — 99214 OFFICE O/P EST MOD 30 MIN: CPT | Performed by: FAMILY MEDICINE

## 2021-06-21 NOTE — PROGRESS NOTES
[facility]      NAME: Brit Bal  AGE: 63 y o  SEX: male  : 1955   MRN: [id number]    DATE: 2021  TIME: 8:20 AM    Assessment and Plan     Problem List Items Addressed This Visit     Malignant neoplasm prostate [facility])     Continue current follow-up schedule with Urology  No evidence of recurrent disease         Hyperlipidemia - Primary     Well controlled with HDL cholesterol greater than 60 and LDL less than 110  Continue Vytorin         Relevant Orders    Comprehensive metabolic panel    Lipid Panel with Direct LDL reflex    TSH, 3rd generation    Essential hypertension     Well controlled with low-dose lisinopril at 5 mg  BMI Counseling: Body mass index is 25 57 kg/m²  The BMI is above normal  Nutrition recommendations include decreasing portion sizes, encouraging healthy choices of fruits and vegetables, limiting drinks that contain sugar and moderation in carbohydrate intake  Exercise recommendations include exercising 3-5 times per week  No pharmacotherapy was ordered  Return to office for removal of skin tag right upper inner thigh    Return to office in:  6 months    Chief Complaint     Chief Complaint   Patient presents with    Follow-up     6 month follow up for hypertension, hyperlipidemia       History of Present Illness     Patient was seen for routine follow-up chronic medical problems  He is being followed for hyperlipidemia for which she takes Vytorin  Takes lisinopril 5 mg for high blood pressure  Is doing well with both of those issues  He is compliant with his medications  He also sees Urology once a year to follow-up on his prostate cancer  There has been no evidence of any recurrence  He did have COVID in March and was treated with regenerate on  He will be receiving his 1st COVID vaccine later this month        The following portions of the patient's history were reviewed and updated as appropriate: allergies, current medications, past family history, past medical history, past social history, past surgical history and problem list     Review of Systems   Review of Systems   Constitutional: Negative  Respiratory: Negative  Cardiovascular: Negative  Gastrointestinal: Negative  Genitourinary: Negative  Musculoskeletal: Negative  Psychiatric/Behavioral: Negative  Active Problem List     Patient Active Problem List   Diagnosis    Arthritis of knee, right    Erectile dysfunction of non-organic origin    Hyperlipidemia    IBS (irritable bowel syndrome)    Vitamin D deficiency    BPH with obstruction/lower urinary tract symptoms    Malignant neoplasm prostate (HCC)    Essential hypertension       Objective   /84 (BP Location: Right arm, Patient Position: Sitting, Cuff Size: Adult)   Pulse 88   Temp 98 9 °F (37 2 °C)   Ht 6' 1" (1 854 m)   Wt 87 9 kg (193 lb 12 8 oz)   SpO2 97%   BMI 25 57 kg/m²     Physical Exam  Vitals and nursing note reviewed  Constitutional:       General: He is not in acute distress  Appearance: He is well-developed  He is not diaphoretic  HENT:      Head: Normocephalic and atraumatic  Eyes:      General:         Right eye: No discharge  Conjunctiva/sclera: Conjunctivae normal       Pupils: Pupils are equal, round, and reactive to light  Neck:      Thyroid: No thyromegaly  Cardiovascular:      Rate and Rhythm: Normal rate and regular rhythm  Pulmonary:      Effort: Pulmonary effort is normal  No respiratory distress  Breath sounds: Normal breath sounds  Musculoskeletal:      Cervical back: Normal range of motion  Lymphadenopathy:      Cervical: No cervical adenopathy  Skin:     General: Skin is warm and dry  Neurological:      Mental Status: He is alert and oriented to person, place, and time  Psychiatric:         Behavior: Behavior normal          Thought Content:  Thought content normal          Judgment: Judgment normal  Current Medications     Current Outpatient Medications:     aspirin (ECOTRIN LOW STRENGTH) 81 mg EC tablet, Take 81 mg by mouth daily, Disp: , Rfl:     ezetimibe-simvastatin (VYTORIN) 10-10 mg per tablet, TAKE 1 TABLET DAILY, Disp: 90 tablet, Rfl: 1    lisinopril (ZESTRIL) 5 mg tablet, Take 1 tablet (5 mg total) by mouth daily, Disp: 90 tablet, Rfl: 1    Health Maintenance     Health Maintenance   Topic Date Due    COVID-19 Vaccine (1) Never done    HIV Screening  Never done    Annual Physical  Never done    DTaP,Tdap,and Td Vaccines (1 - Tdap) Never done    Depression Screening PHQ  06/21/2022    BMI: Followup Plan  06/21/2022    BMI: Adult  06/21/2022    Colorectal Cancer Screening  11/09/2023    Hepatitis C Screening  Completed    Influenza Vaccine  Completed    Pneumococcal Vaccine: Pediatrics (0 to 5 Years) and At-Risk Patients (6 to 59 Years)  Aged Out    HIB Vaccine  Aged Out    Hepatitis B Vaccine  Aged Out    IPV Vaccine  Aged Out    Hepatitis A Vaccine  Aged Out    Meningococcal ACWY Vaccine  Aged Out    HPV Vaccine  Aged [employer] History   Administered Date(s) Administered    H1N1, All Formulations 12/28/2009    INFLUENZA 10/03/2016    Influenza Quadrivalent, 6-35 Months IM 10/03/2016, 11/06/2017    Influenza, recombinant, quadrivalent,injectable, preservative free 10/29/2018, 11/11/2019, 09/16/2020    Influenza, seasonal, injectable 01/01/2011, 11/08/2012, 11/25/2014, 12/04/2015    Zoster 11/06/2017    Zoster Vaccine Recombinant 09/21/2020, 12/21/2020       Milka Manzano DO  [facility]

## 2021-06-22 ENCOUNTER — TELEPHONE (OUTPATIENT)
Dept: FAMILY MEDICINE CLINIC | Facility: CLINIC | Age: 66
End: 2021-06-22

## 2021-06-22 NOTE — TELEPHONE ENCOUNTER
----- Message from Alex Lora DO sent at 6/21/2021  8:21 AM EDT -----  Please make December visit 30 minutes for annual physical

## 2021-06-23 ENCOUNTER — IMMUNIZATIONS (OUTPATIENT)
Dept: FAMILY MEDICINE CLINIC | Facility: HOSPITAL | Age: 66
End: 2021-06-23

## 2021-06-23 DIAGNOSIS — Z23 ENCOUNTER FOR IMMUNIZATION: Primary | ICD-10-CM

## 2021-06-23 PROCEDURE — 0001A SARS-COV-2 / COVID-19 MRNA VACCINE (PFIZER-BIONTECH) 30 MCG: CPT

## 2021-06-23 PROCEDURE — 91300 SARS-COV-2 / COVID-19 MRNA VACCINE (PFIZER-BIONTECH) 30 MCG: CPT

## 2021-07-07 ENCOUNTER — PROCEDURE VISIT (OUTPATIENT)
Dept: FAMILY MEDICINE CLINIC | Facility: CLINIC | Age: 66
End: 2021-07-07
Payer: COMMERCIAL

## 2021-07-07 VITALS
BODY MASS INDEX: 25.31 KG/M2 | SYSTOLIC BLOOD PRESSURE: 132 MMHG | DIASTOLIC BLOOD PRESSURE: 84 MMHG | OXYGEN SATURATION: 99 % | HEART RATE: 84 BPM | WEIGHT: 191 LBS | TEMPERATURE: 97.7 F | HEIGHT: 73 IN

## 2021-07-07 DIAGNOSIS — L91.8 INFLAMED SKIN TAG: Primary | ICD-10-CM

## 2021-07-07 PROCEDURE — 88305 TISSUE EXAM BY PATHOLOGIST: CPT | Performed by: PATHOLOGY

## 2021-07-07 PROCEDURE — 88312 SPECIAL STAINS GROUP 1: CPT | Performed by: PATHOLOGY

## 2021-07-07 PROCEDURE — 11200 RMVL SKIN TAGS UP TO&INC 15: CPT | Performed by: FAMILY MEDICINE

## 2021-07-07 NOTE — PROGRESS NOTES
Patient returns for excision of probable skin tag right upper inner thigh    Skin tag removal    Date/Time: 7/7/2021 1:15 PM  Performed by: Jose Quiroz DO  Authorized by: Jose Quiroz DO   [place] Protocol:  Consent: Verbal consent obtained  Risks and benefits: risks, benefits and alternatives were discussed  Consent given by: patient  Patient understanding: patient does not state understanding of the procedure being performed  Patient identity confirmed: verbally with patient        Procedure Details - Skin Tag Destruction:     Up to 15      Body area:  Lower extremity    Lower extremity location:  R upper leg (Inner thigh)    Initial size (mm):  4    Final defect size (mm):  4    Malignancy: malignancy unknown      Destruction method: scissors used for extraction    Lesion 6:       Irritated skin tag removed by scissor extraction  Lesion cauterized with try Chloraseptic acid  Specimen sent for pathology

## 2021-07-13 ENCOUNTER — TELEPHONE (OUTPATIENT)
Dept: FAMILY MEDICINE CLINIC | Facility: CLINIC | Age: 66
End: 2021-07-13

## 2021-07-13 NOTE — TELEPHONE ENCOUNTER
I spoke with patient, he is currently not symptomatic  I advised him he did not need to have a COVID test   He told me he was actually at [facility] right now having a rapid COVID test done and was told it was negative  He will continue with appointment tomorrow for vaccine

## 2021-07-13 NOTE — TELEPHONE ENCOUNTER
Given that he had COVID just about 4 months ago and that he has had 1 of the 2 vaccines, it is highly unlikely that he would have COVID now, however because of the fever I did place an order for COVID testing which he can get done today at [facility]  He just needs to drive to the [facility] now and call their number and someone comes out to the car to test him    It may take 24 hours for the results to come back

## 2021-07-13 NOTE — TELEPHONE ENCOUNTER
PT states he had a headache on Sunday 07/11/21  The following day on Monday 07/12/21 he had a temp of 99 7  PT is due to get his next Covid vaccination on 07/13/2021  He is concerned he might have COVID  Should he rosio tested ? Before the vaccine ?

## 2021-07-14 ENCOUNTER — IMMUNIZATIONS (OUTPATIENT)
Dept: FAMILY MEDICINE CLINIC | Facility: HOSPITAL | Age: 66
End: 2021-07-14

## 2021-07-14 DIAGNOSIS — Z23 ENCOUNTER FOR IMMUNIZATION: Primary | ICD-10-CM

## 2021-07-14 PROCEDURE — 0002A SARS-COV-2 / COVID-19 MRNA VACCINE (PFIZER-BIONTECH) 30 MCG: CPT

## 2021-07-14 PROCEDURE — 91300 SARS-COV-2 / COVID-19 MRNA VACCINE (PFIZER-BIONTECH) 30 MCG: CPT

## 2021-07-16 ENCOUNTER — TELEPHONE (OUTPATIENT)
Dept: FAMILY MEDICINE CLINIC | Facility: CLINIC | Age: 66
End: 2021-07-16

## 2021-07-16 NOTE — TELEPHONE ENCOUNTER
I spoke to patient - he should have an appointment to be seen  Fever and headache since Monday  COVID vaccine on Wednesday  Urine is more yellow than usual  Negative COVID test this week  He was scheduled to see you tomorrow, Saturday       ----- Message from Ru Carlson sent at 7/16/2021  2:03 PM EDT -----  Regarding: FW: Non-Urgent Medical Question  Contact: [phone]    ----- Message -----  From: Araceli Brown  Sent: 7/16/2021   9:08 AM EDT  To: [facility]  Subject: Non-Urgent Medical Question                      Hello, following up:  1-I got the [place] booster Wed evening  2-I still have low grade fever since Monday  Today 99 7 plus headache  3-urine is rather fellow, more than usual    Should I come in ?     Araceli Brown

## 2021-07-17 ENCOUNTER — OFFICE VISIT (OUTPATIENT)
Dept: FAMILY MEDICINE CLINIC | Facility: CLINIC | Age: 66
End: 2021-07-17
Payer: COMMERCIAL

## 2021-07-17 ENCOUNTER — TELEPHONE (OUTPATIENT)
Dept: FAMILY MEDICINE CLINIC | Facility: CLINIC | Age: 66
End: 2021-07-17

## 2021-07-17 VITALS
BODY MASS INDEX: 25.13 KG/M2 | TEMPERATURE: 99.3 F | OXYGEN SATURATION: 100 % | HEIGHT: 73 IN | HEART RATE: 94 BPM | DIASTOLIC BLOOD PRESSURE: 64 MMHG | SYSTOLIC BLOOD PRESSURE: 128 MMHG | WEIGHT: 189.6 LBS

## 2021-07-17 DIAGNOSIS — R50.9 FEVER, UNSPECIFIED FEVER CAUSE: ICD-10-CM

## 2021-07-17 DIAGNOSIS — N30.00 ACUTE CYSTITIS WITHOUT HEMATURIA: Primary | ICD-10-CM

## 2021-07-17 DIAGNOSIS — R74.8 ELEVATED LIVER ENZYMES: ICD-10-CM

## 2021-07-17 DIAGNOSIS — R82.2 BILIRUBIN IN URINE: ICD-10-CM

## 2021-07-17 PROBLEM — R35.0 URINARY FREQUENCY: Status: ACTIVE | Noted: 2021-07-17

## 2021-07-17 LAB
SL AMB  POCT GLUCOSE, UA: ABNORMAL
SL AMB LEUKOCYTE ESTERASE,UA: ABNORMAL
SL AMB POCT BILIRUBIN,UA: ABNORMAL
SL AMB POCT BLOOD,UA: ABNORMAL
SL AMB POCT CLARITY,UA: CLEAR
SL AMB POCT COLOR,UA: ABNORMAL
SL AMB POCT KETONES,UA: ABNORMAL
SL AMB POCT NITRITE,UA: POSITIVE
SL AMB POCT PH,UA: 5.5
SL AMB POCT SPECIFIC GRAVITY,UA: >=1.03
SL AMB POCT URINE PROTEIN: ABNORMAL
SL AMB POCT UROBILINOGEN: 4

## 2021-07-17 PROCEDURE — 99214 OFFICE O/P EST MOD 30 MIN: CPT | Performed by: FAMILY MEDICINE

## 2021-07-17 PROCEDURE — 3008F BODY MASS INDEX DOCD: CPT | Performed by: FAMILY MEDICINE

## 2021-07-17 PROCEDURE — 81003 URINALYSIS AUTO W/O SCOPE: CPT | Performed by: FAMILY MEDICINE

## 2021-07-17 RX ORDER — CEPHALEXIN 500 MG/1
500 CAPSULE ORAL EVERY 12 HOURS SCHEDULED
Qty: 14 CAPSULE | Refills: 0 | Status: SHIPPED | OUTPATIENT
Start: 2021-07-17 | End: 2021-07-21

## 2021-07-17 NOTE — PROGRESS NOTES
Assessment/Plan:     1  Acute cystitis without hematuria  Assessment & Plan:  U/a showing nitrites and c/w UTI; will check labs given bilirubin in urine; start abx; f/u on blood work; ER guidance given    Orders:  -     POCT urine dip auto non-scope  -     Urine culture  -     cephalexin (KEFLEX) 500 mg capsule; Take 1 capsule (500 mg total) by mouth every 12 (twelve) hours for 7 days  -     CBC; Future; Expected date: 07/17/2021    2  Bilirubin in urine  Assessment & Plan: Will check cbc and CMP to r/o liver disease; exam benign; f/u guidance given    Orders:  -     CBC; Future; Expected date: 07/17/2021  -     Comprehensive metabolic panel; Future        Subjective:      Patient ID: Magnolia Grigsby is a 63 y o  male  Patient complaining of HA, chills, and abdominal pain lower  Started on Monday  Had COVID19 vaccine on Wednesday  Yesterday he had fatigue headache and fever but related that to the vaccine  Started noticing Monday urine was more yellow  No dysuria or urinary frequency  Hx of prostatectomy  No urinary issues since then  No significant abdominal pain today  The following portions of the patient's history were reviewed and updated as appropriate: allergies, current medications, past family history, past medical history, past social history, past surgical history, and problem list     Review of Systems   Constitutional: Positive for chills, fatigue and fever  Respiratory: Negative for cough and wheezing  Cardiovascular: Negative for chest pain  Gastrointestinal: Negative for abdominal pain, nausea and vomiting  Genitourinary: Negative  Musculoskeletal: Positive for arthralgias  Neurological: Positive for headaches           Objective:      /64 (BP Location: Right arm, Patient Position: Sitting, Cuff Size: Large)   Pulse 94   Temp 99 3 °F (37 4 °C) (Tympanic)   Ht 6' 1" (1 854 m)   Wt 86 kg (189 lb 9 6 oz)   SpO2 100%   BMI 25 01 kg/m²          Physical Exam  Vitals reviewed  Constitutional:       General: He is not in acute distress  Appearance: Normal appearance  He is not ill-appearing, toxic-appearing or diaphoretic  HENT:      Head: Normocephalic and atraumatic  Eyes:      General: No scleral icterus  Right eye: No discharge  Left eye: No discharge  Conjunctiva/sclera: Conjunctivae normal    Cardiovascular:      Rate and Rhythm: Normal rate and regular rhythm  Pulses: Normal pulses  Heart sounds: Normal heart sounds  No murmur heard  No gallop  Pulmonary:      Effort: Pulmonary effort is normal  No respiratory distress  Breath sounds: Normal breath sounds  No stridor  No wheezing, rhonchi or rales  Abdominal:      General: Bowel sounds are normal  There is no distension  Palpations: Abdomen is soft  Tenderness: There is no abdominal tenderness  There is no right CVA tenderness, left CVA tenderness, guarding or rebound  Musculoskeletal:      Right lower leg: No edema  Left lower leg: No edema  Neurological:      General: No focal deficit present  Mental Status: He is alert and oriented to person, place, and time  Psychiatric:         Mood and Affect: Mood normal          Behavior: Behavior normal          Thought Content:  Thought content normal          Judgment: Judgment normal

## 2021-07-17 NOTE — PATIENT INSTRUCTIONS
Start Keflex  Continue to increase fluids  Call if no improvement in the next 48 hours or if new or worsening symptoms

## 2021-07-17 NOTE — TELEPHONE ENCOUNTER
Called [facility] and spoke to Peña to schedule a   Confirmation # [id number]  Specimen placed in box

## 2021-07-17 NOTE — ASSESSMENT & PLAN NOTE
U/a showing nitrites and c/w UTI; will check labs given bilirubin in urine; start abx; f/u on blood work; ER guidance given

## 2021-07-19 ENCOUNTER — APPOINTMENT (OUTPATIENT)
Dept: LAB | Facility: CLINIC | Age: 66
End: 2021-07-19
Payer: COMMERCIAL

## 2021-07-19 ENCOUNTER — PATIENT MESSAGE (OUTPATIENT)
Dept: FAMILY MEDICINE CLINIC | Facility: CLINIC | Age: 66
End: 2021-07-19

## 2021-07-19 ENCOUNTER — HOSPITAL ENCOUNTER (OUTPATIENT)
Dept: ULTRASOUND IMAGING | Facility: HOSPITAL | Age: 66
Discharge: HOME | End: 2021-07-19
Payer: COMMERCIAL

## 2021-07-19 DIAGNOSIS — R74.8 ELEVATED LIVER ENZYMES: ICD-10-CM

## 2021-07-19 DIAGNOSIS — R82.2 BILIRUBIN IN URINE: ICD-10-CM

## 2021-07-19 DIAGNOSIS — N30.00 ACUTE CYSTITIS WITHOUT HEMATURIA: ICD-10-CM

## 2021-07-19 DIAGNOSIS — R50.9 FEVER, UNSPECIFIED FEVER CAUSE: ICD-10-CM

## 2021-07-19 LAB
ALBUMIN SERPL BCP-MCNC: 3.2 G/DL (ref 3.5–5)
ALP SERPL-CCNC: 202 U/L (ref 46–116)
ALT SERPL W P-5'-P-CCNC: 158 U/L (ref 12–78)
ANION GAP SERPL CALCULATED.3IONS-SCNC: 6 MMOL/L (ref 4–13)
AST SERPL W P-5'-P-CCNC: 104 U/L (ref 5–45)
BILIRUB SERPL-MCNC: 2.54 MG/DL (ref 0.2–1)
BUN SERPL-MCNC: 14 MG/DL (ref 5–25)
CALCIUM ALBUM COR SERPL-MCNC: 9.9 MG/DL (ref 8.3–10.1)
CALCIUM SERPL-MCNC: 9.3 MG/DL (ref 8.3–10.1)
CHLORIDE SERPL-SCNC: 101 MMOL/L (ref 100–108)
CO2 SERPL-SCNC: 24 MMOL/L (ref 21–32)
CREAT SERPL-MCNC: 1.07 MG/DL (ref 0.6–1.3)
ERYTHROCYTE [DISTWIDTH] IN BLOOD BY AUTOMATED COUNT: 14.8 % (ref 11.6–15.1)
GFR SERPL CREATININE-BSD FRML MDRD: 73 ML/MIN/1.73SQ M
GLUCOSE P FAST SERPL-MCNC: 141 MG/DL (ref 65–99)
HCT VFR BLD AUTO: 38.5 % (ref 36.5–49.3)
HGB BLD-MCNC: 12.5 G/DL (ref 12–17)
LIPASE SERPL-CCNC: 148 U/L (ref 73–393)
MCH RBC QN AUTO: 30.3 PG (ref 26.8–34.3)
MCHC RBC AUTO-ENTMCNC: 32.5 G/DL (ref 31.4–37.4)
MCV RBC AUTO: 93 FL (ref 82–98)
POTASSIUM SERPL-SCNC: 4.1 MMOL/L (ref 3.5–5.3)
PROT SERPL-MCNC: 7.7 G/DL (ref 6.4–8.2)
RBC # BLD AUTO: 4.13 MILLION/UL (ref 3.88–5.62)
SODIUM SERPL-SCNC: 131 MMOL/L (ref 136–145)
WBC # BLD AUTO: 5.82 THOUSAND/UL (ref 4.31–10.16)

## 2021-07-19 PROCEDURE — 76705 ECHO EXAM OF ABDOMEN: CPT

## 2021-07-19 PROCEDURE — 80053 COMPREHEN METABOLIC PANEL: CPT

## 2021-07-19 PROCEDURE — 36415 COLL VENOUS BLD VENIPUNCTURE: CPT

## 2021-07-19 PROCEDURE — 83690 ASSAY OF LIPASE: CPT

## 2021-07-19 PROCEDURE — 85027 COMPLETE CBC AUTOMATED: CPT

## 2021-07-19 NOTE — TELEPHONE ENCOUNTER
From: Jessica Carr  To: Chuy Arias DO  Sent: 7/19/2021 6:00 AM EDT  Subject: Non-Urgent Medical Question    Hello,  Symptoms the same but temp @ 101 9 this am   Getting labs done this morning as everyone was closed Sunday  No apparent blood in urine but color not improved  4 doses meds so far  Headache      Dayne Angeles

## 2021-07-20 ENCOUNTER — LAB (OUTPATIENT)
Dept: LAB | Facility: CLINIC | Age: 66
End: 2021-07-20
Payer: COMMERCIAL

## 2021-07-20 DIAGNOSIS — R74.8 ELEVATED LIVER ENZYMES: ICD-10-CM

## 2021-07-20 DIAGNOSIS — R50.9 FEVER, UNSPECIFIED FEVER CAUSE: ICD-10-CM

## 2021-07-20 DIAGNOSIS — R82.2 BILIRUBIN IN URINE: ICD-10-CM

## 2021-07-20 DIAGNOSIS — R50.81 FEVER IN OTHER DISEASES: ICD-10-CM

## 2021-07-20 LAB
ALBUMIN SERPL BCP-MCNC: 3.1 G/DL (ref 3.5–5)
ALP SERPL-CCNC: 242 U/L (ref 46–116)
ALT SERPL W P-5'-P-CCNC: 184 U/L (ref 12–78)
ANION GAP SERPL CALCULATED.3IONS-SCNC: 8 MMOL/L (ref 4–13)
AST SERPL W P-5'-P-CCNC: 138 U/L (ref 5–45)
BACTERIA UR QL AUTO: ABNORMAL /HPF
BILIRUB SERPL-MCNC: 2.64 MG/DL (ref 0.2–1)
BILIRUB UR QL STRIP: ABNORMAL
BUN SERPL-MCNC: 22 MG/DL (ref 5–25)
CALCIUM ALBUM COR SERPL-MCNC: 10.4 MG/DL (ref 8.3–10.1)
CALCIUM SERPL-MCNC: 9.7 MG/DL (ref 8.3–10.1)
CHLORIDE SERPL-SCNC: 100 MMOL/L (ref 100–108)
CLARITY UR: ABNORMAL
CO2 SERPL-SCNC: 24 MMOL/L (ref 21–32)
COLOR UR: ABNORMAL
CREAT SERPL-MCNC: 1.15 MG/DL (ref 0.6–1.3)
GFR SERPL CREATININE-BSD FRML MDRD: 67 ML/MIN/1.73SQ M
GLUCOSE SERPL-MCNC: 155 MG/DL (ref 65–140)
GLUCOSE UR STRIP-MCNC: NEGATIVE MG/DL
HAV IGM SER QL: NORMAL
HBV CORE IGM SER QL: NORMAL
HBV SURFACE AG SER QL: NORMAL
HCV AB SER QL: NORMAL
HGB UR QL STRIP.AUTO: ABNORMAL
HYALINE CASTS #/AREA URNS LPF: ABNORMAL /LPF
KETONES UR STRIP-MCNC: ABNORMAL MG/DL
LEUKOCYTE ESTERASE UR QL STRIP: ABNORMAL
NITRITE UR QL STRIP: NEGATIVE
NON-SQ EPI CELLS URNS QL MICRO: ABNORMAL /HPF
PH UR STRIP.AUTO: 6 [PH]
POTASSIUM SERPL-SCNC: 4 MMOL/L (ref 3.5–5.3)
PROT SERPL-MCNC: 7.9 G/DL (ref 6.4–8.2)
PROT UR STRIP-MCNC: ABNORMAL MG/DL
RBC #/AREA URNS AUTO: ABNORMAL /HPF
SODIUM SERPL-SCNC: 132 MMOL/L (ref 136–145)
SP GR UR STRIP.AUTO: 1.03 (ref 1–1.03)
UROBILINOGEN UR QL STRIP.AUTO: 1 E.U./DL
WBC #/AREA URNS AUTO: ABNORMAL /HPF

## 2021-07-20 PROCEDURE — 81001 URINALYSIS AUTO W/SCOPE: CPT | Performed by: FAMILY MEDICINE

## 2021-07-20 PROCEDURE — 80074 ACUTE HEPATITIS PANEL: CPT

## 2021-07-20 PROCEDURE — 84443 ASSAY THYROID STIM HORMONE: CPT

## 2021-07-20 PROCEDURE — 86645 CMV ANTIBODY IGM: CPT

## 2021-07-20 PROCEDURE — 86663 EPSTEIN-BARR ANTIBODY: CPT

## 2021-07-20 PROCEDURE — 86644 CMV ANTIBODY: CPT

## 2021-07-20 PROCEDURE — 86664 EPSTEIN-BARR NUCLEAR ANTIGEN: CPT

## 2021-07-20 PROCEDURE — 36415 COLL VENOUS BLD VENIPUNCTURE: CPT

## 2021-07-20 PROCEDURE — 86665 EPSTEIN-BARR CAPSID VCA: CPT

## 2021-07-20 PROCEDURE — 83540 ASSAY OF IRON: CPT

## 2021-07-20 PROCEDURE — 86618 LYME DISEASE ANTIBODY: CPT

## 2021-07-20 PROCEDURE — 80053 COMPREHEN METABOLIC PANEL: CPT

## 2021-07-21 ENCOUNTER — APPOINTMENT (OUTPATIENT)
Dept: LAB | Facility: HOSPITAL | Age: 66
End: 2021-07-21
Payer: COMMERCIAL

## 2021-07-21 ENCOUNTER — TELEPHONE (OUTPATIENT)
Dept: FAMILY MEDICINE CLINIC | Facility: CLINIC | Age: 66
End: 2021-07-21

## 2021-07-21 ENCOUNTER — OFFICE VISIT (OUTPATIENT)
Dept: FAMILY MEDICINE CLINIC | Facility: CLINIC | Age: 66
End: 2021-07-21
Payer: COMMERCIAL

## 2021-07-21 VITALS
DIASTOLIC BLOOD PRESSURE: 82 MMHG | WEIGHT: 189.2 LBS | SYSTOLIC BLOOD PRESSURE: 144 MMHG | TEMPERATURE: 98.3 F | RESPIRATION RATE: 18 BRPM | HEART RATE: 119 BPM | HEIGHT: 73 IN | OXYGEN SATURATION: 97 % | BODY MASS INDEX: 25.08 KG/M2

## 2021-07-21 DIAGNOSIS — R50.81 FEVER IN OTHER DISEASES: Primary | ICD-10-CM

## 2021-07-21 DIAGNOSIS — R51.9 ACUTE NONINTRACTABLE HEADACHE, UNSPECIFIED HEADACHE TYPE: ICD-10-CM

## 2021-07-21 DIAGNOSIS — R74.01 TRANSAMINITIS: ICD-10-CM

## 2021-07-21 DIAGNOSIS — R31.9 HEMATURIA, UNSPECIFIED TYPE: ICD-10-CM

## 2021-07-21 LAB
CMV IGG SERPL IA-ACNC: <0.6 U/ML (ref 0–0.59)
CMV IGM SERPL IA-ACNC: <30 AU/ML (ref 0–29.9)
IRON SERPL-MCNC: 46 UG/DL (ref 65–175)
SL AMB  POCT GLUCOSE, UA: ABNORMAL
SL AMB LEUKOCYTE ESTERASE,UA: ABNORMAL
SL AMB POCT BILIRUBIN,UA: ABNORMAL
SL AMB POCT BLOOD,UA: ABNORMAL
SL AMB POCT CLARITY,UA: CLEAR
SL AMB POCT COLOR,UA: YELLOW
SL AMB POCT KETONES,UA: ABNORMAL
SL AMB POCT NITRITE,UA: ABNORMAL
SL AMB POCT PH,UA: 5.5
SL AMB POCT SPECIFIC GRAVITY,UA: >=1.03
SL AMB POCT URINE PROTEIN: ABNORMAL
SL AMB POCT UROBILINOGEN: 4
TSH SERPL DL<=0.05 MIU/L-ACNC: 1.26 UIU/ML (ref 0.36–3.74)

## 2021-07-21 PROCEDURE — 3077F SYST BP >= 140 MM HG: CPT | Performed by: FAMILY MEDICINE

## 2021-07-21 PROCEDURE — 86666 EHRLICHIA ANTIBODY: CPT

## 2021-07-21 PROCEDURE — 3079F DIAST BP 80-89 MM HG: CPT | Performed by: FAMILY MEDICINE

## 2021-07-21 PROCEDURE — 81001 URINALYSIS AUTO W/SCOPE: CPT | Performed by: FAMILY MEDICINE

## 2021-07-21 PROCEDURE — 3008F BODY MASS INDEX DOCD: CPT | Performed by: FAMILY MEDICINE

## 2021-07-21 PROCEDURE — 99214 OFFICE O/P EST MOD 30 MIN: CPT | Performed by: FAMILY MEDICINE

## 2021-07-21 PROCEDURE — U0003 INFECTIOUS AGENT DETECTION BY NUCLEIC ACID (DNA OR RNA); SEVERE ACUTE RESPIRATORY SYNDROME CORONAVIRUS 2 (SARS-COV-2) (CORONAVIRUS DISEASE [COVID-19]), AMPLIFIED PROBE TECHNIQUE, MAKING USE OF HIGH THROUGHPUT TECHNOLOGIES AS DESCRIBED BY CMS-2020-01-R: HCPCS | Performed by: FAMILY MEDICINE

## 2021-07-21 PROCEDURE — U0005 INFEC AGEN DETEC AMPLI PROBE: HCPCS | Performed by: FAMILY MEDICINE

## 2021-07-21 PROCEDURE — 81003 URINALYSIS AUTO W/O SCOPE: CPT | Performed by: FAMILY MEDICINE

## 2021-07-21 PROCEDURE — 1036F TOBACCO NON-USER: CPT | Performed by: FAMILY MEDICINE

## 2021-07-21 NOTE — PROGRESS NOTES
Assessment/Plan:     1  Fever in other diseases  Assessment & Plan:  Unclear etiology; suspect viral vs lyme given symptoms and labs; awaiting Lyme and EBV; nontoxic appearing in office and exam benign; given travel hx to [place] will add ehrlichia, anaplasmosis, and babesia labs; check additional labs given fever x 10 days and not improving with elevated liver enzymes; referred to ID and GI in setting of transaminititis and no improvement    Orders:  -     Parvovirus B19 antibody, IgG and IgM; Future  -     CBC and differential; Future  -     Comprehensive metabolic panel; Future  -     Sedimentation rate, automated; Future  -     LIBERTAD Screen w/ Reflex to Titer/Pattern; Future  -     TSH, 3rd generation; Future  -     Iron; Future  -     Blood culture; Future  -     POCT urine dip auto non-scope  -     Novel Coronavirus (COVID-19), PCR [facility] Collected in Office  -     Ehrlichia Detection PCR; Future  -     Anaplasma Phagocytophilum, PCR; Future  -     Babesia microti antibody, IgG & Igm; Future  -     Blood culture; Future  -     UA w Reflex to Microscopic w Reflex to Culture - Clinic Collect    2  Acute nonintractable headache, unspecified headache type  Assessment & Plan:  Suspect related to fever and acute illness; tx pending results    Orders:  -     Babesia microti antibody, IgG & Igm; Future    3  Hematuria, unspecified type  Assessment & Plan:  F/u on repeat urine culture    Orders:  -     Urine culture; Future  -     UA w Reflex to Microscopic w Reflex to Culture - Clinic Collect        Subjective:      Patient ID: Jorge A Rae is a 61 y o  male  Patient is here for follow up on fever and LFTs  States he has had fever now for 10 days  What is most bothersome to him is his headache  Mostly symptoms just consist of fever and headache  Concerned it may be Lyme disease  Rapid COVID19 was negative  He did have COVID19 about 4 months ago and has had his first vaccine   Symptoms started prior to last vaccine but worsened after having second vaccine  Urine culture showed mixed contaminants  Denies any significant abdominal pain  No N/V  Hepatitis panel was negative  He continues to have transaminitis with his fevers  Of note, he states he was in [place] about 1 month ago, concern he may have been exposed to a tick borne illness then  Noticed an occasional cough but nothing significant  Maybe a few times clearing cough the last 2 days  No congestion or URI symptoms  No neck stiffness  Denies any photosensitivity or N/V or neck pain  Has been taking ibuprofen to help with fevers and headache  The following portions of the patient's history were reviewed and updated as appropriate: allergies, current medications, past family history, past medical history, past social history, past surgical history, and problem list     Review of Systems   Constitutional: Positive for chills, fatigue and fever  HENT: Negative  Eyes: Negative for photophobia and pain  Respiratory: Positive for cough  Negative for chest tightness, shortness of breath, wheezing and stridor  Cardiovascular: Negative for chest pain, palpitations and leg swelling  Gastrointestinal: Negative for abdominal pain, blood in stool, diarrhea, nausea and vomiting  Genitourinary: Negative for difficulty urinating, frequency and urgency  Musculoskeletal: Negative for back pain and neck stiffness  Skin: Negative for rash  Neurological: Positive for headaches  Objective:      /82 (BP Location: Right arm, Patient Position: Sitting, Cuff Size: Standard)   Pulse (!) 119   Temp 98 3 °F (36 8 °C) (Temporal)   Resp 18   Ht 6' 1" (1 854 m)   Wt 85 8 kg (189 lb 3 2 oz)   SpO2 97%   BMI 24 96 kg/m²          Physical Exam  Vitals reviewed  Constitutional:       General: He is not in acute distress  Appearance: Normal appearance  He is not ill-appearing, toxic-appearing or diaphoretic  HENT:      Head: Normocephalic and atraumatic  Eyes:      General: No scleral icterus  Right eye: No discharge  Left eye: No discharge  Conjunctiva/sclera: Conjunctivae normal    Neck:      Trachea: Trachea normal       Meningeal: Brudzinski's sign and Kernig's sign absent  Cardiovascular:      Rate and Rhythm: Normal rate and regular rhythm  Pulses: Normal pulses  Heart sounds: Normal heart sounds  No murmur heard  No gallop  Pulmonary:      Effort: Pulmonary effort is normal  No respiratory distress  Breath sounds: Normal breath sounds  No stridor  No wheezing, rhonchi or rales  Abdominal:      General: Abdomen is flat  There is no distension  Palpations: Abdomen is soft  Tenderness: There is no abdominal tenderness  There is no guarding  Musculoskeletal:      Right lower leg: No edema  Left lower leg: No edema  Lymphadenopathy:      Cervical: No cervical adenopathy  Neurological:      General: No focal deficit present  Mental Status: He is alert and oriented to person, place, and time  Cranial Nerves: Cranial nerves are intact  Coordination: Coordination is intact  Psychiatric:         Mood and Affect: Mood normal          Behavior: Behavior normal          Thought Content:  Thought content normal          Judgment: Judgment normal

## 2021-07-21 NOTE — TELEPHONE ENCOUNTER
Called patient to follow up on symptoms  Patient is still having fevers  Today feeling worse with temp of 101 F  This is day 10 of fevers  Liver enzymes still elevated  EBV pending  Will refer to both ID and GI and add additional testing

## 2021-07-21 NOTE — ASSESSMENT & PLAN NOTE
Unclear etiology; suspect viral vs lyme given symptoms and labs; awaiting Lyme and EBV; nontoxic appearing in office and exam benign; given travel hx to [place] will add ehrlichia, anaplasmosis, and babesia labs; check additional labs given fever x 10 days and not improving with elevated liver enzymes; referred to ID and GI in setting of transaminititis and no improvement

## 2021-07-22 ENCOUNTER — PATIENT MESSAGE (OUTPATIENT)
Dept: FAMILY MEDICINE CLINIC | Facility: CLINIC | Age: 66
End: 2021-07-22

## 2021-07-22 LAB
B BURGDOR IGG+IGM SER-ACNC: 17
BACTERIA UR QL AUTO: NORMAL /HPF
BILIRUB UR QL STRIP: ABNORMAL
CLARITY UR: CLEAR
COLOR UR: ABNORMAL
EBV NA IGG SER IA-ACNC: 423 U/ML (ref 0–17.9)
EBV VCA IGG SER IA-ACNC: 21.2 U/ML (ref 0–17.9)
EBV VCA IGM SER IA-ACNC: 64.2 U/ML (ref 0–35.9)
GLUCOSE UR STRIP-MCNC: NEGATIVE MG/DL
HGB UR QL STRIP.AUTO: NEGATIVE
INTERPRETATION: ABNORMAL
KETONES UR STRIP-MCNC: NEGATIVE MG/DL
LEUKOCYTE ESTERASE UR QL STRIP: NEGATIVE
MUCOUS THREADS UR QL AUTO: NORMAL
NITRITE UR QL STRIP: NEGATIVE
NON-SQ EPI CELLS URNS QL MICRO: NORMAL /HPF
PH UR STRIP.AUTO: 5.5 [PH]
PROT UR STRIP-MCNC: ABNORMAL MG/DL
RBC #/AREA URNS AUTO: NORMAL /HPF
SARS-COV-2 RNA RESP QL NAA+PROBE: NEGATIVE
SP GR UR STRIP.AUTO: 1.02 (ref 1–1.03)
UROBILINOGEN UR QL STRIP.AUTO: 1 E.U./DL
WBC #/AREA URNS AUTO: NORMAL /HPF

## 2021-07-23 NOTE — TELEPHONE ENCOUNTER
Reviewed  Spoke with patient yesterday  Okay to hold on referral since we have source of EBV and pt starting to feel better  Will be monitoring labs closely

## 2021-07-26 ENCOUNTER — PATIENT MESSAGE (OUTPATIENT)
Dept: FAMILY MEDICINE CLINIC | Facility: CLINIC | Age: 66
End: 2021-07-26

## 2021-07-27 LAB
A PHAGOCYTOPH IGG TITR SER IF: NEGATIVE {TITER}
A PHAGOCYTOPH IGM TITR SER IF: NEGATIVE {TITER}
E CHAFFEENSIS IGG TITR SER IF: NEGATIVE {TITER}
E CHAFFEENSIS IGM TITR SER IF: NEGATIVE {TITER}

## 2021-07-28 ENCOUNTER — APPOINTMENT (OUTPATIENT)
Dept: LAB | Facility: CLINIC | Age: 66
End: 2021-07-28
Payer: COMMERCIAL

## 2021-07-28 DIAGNOSIS — R74.8 ELEVATED LIVER ENZYMES: ICD-10-CM

## 2021-07-28 LAB
ALBUMIN SERPL BCP-MCNC: 3.5 G/DL (ref 3.5–5)
ALP SERPL-CCNC: 180 U/L (ref 46–116)
ALT SERPL W P-5'-P-CCNC: 67 U/L (ref 12–78)
ANION GAP SERPL CALCULATED.3IONS-SCNC: 7 MMOL/L (ref 4–13)
AST SERPL W P-5'-P-CCNC: 29 U/L (ref 5–45)
BILIRUB SERPL-MCNC: 1.08 MG/DL (ref 0.2–1)
BUN SERPL-MCNC: 17 MG/DL (ref 5–25)
CALCIUM ALBUM COR SERPL-MCNC: 9.8 MG/DL (ref 8.3–10.1)
CALCIUM SERPL-MCNC: 9.4 MG/DL (ref 8.3–10.1)
CHLORIDE SERPL-SCNC: 103 MMOL/L (ref 100–108)
CO2 SERPL-SCNC: 24 MMOL/L (ref 21–32)
CREAT SERPL-MCNC: 0.99 MG/DL (ref 0.6–1.3)
ERYTHROCYTE [DISTWIDTH] IN BLOOD BY AUTOMATED COUNT: 16.8 % (ref 11.6–15.1)
GFR SERPL CREATININE-BSD FRML MDRD: 80 ML/MIN/1.73SQ M
GLUCOSE SERPL-MCNC: 113 MG/DL (ref 65–140)
HCT VFR BLD AUTO: 33 % (ref 36.5–49.3)
HGB BLD-MCNC: 10.6 G/DL (ref 12–17)
MCH RBC QN AUTO: 30.5 PG (ref 26.8–34.3)
MCHC RBC AUTO-ENTMCNC: 32.1 G/DL (ref 31.4–37.4)
MCV RBC AUTO: 95 FL (ref 82–98)
PLATELET # BLD AUTO: 334 THOUSANDS/UL (ref 149–390)
PMV BLD AUTO: 10.1 FL (ref 8.9–12.7)
POTASSIUM SERPL-SCNC: 4.4 MMOL/L (ref 3.5–5.3)
PROT SERPL-MCNC: 8.1 G/DL (ref 6.4–8.2)
RBC # BLD AUTO: 3.48 MILLION/UL (ref 3.88–5.62)
SODIUM SERPL-SCNC: 134 MMOL/L (ref 136–145)
WBC # BLD AUTO: 7.1 THOUSAND/UL (ref 4.31–10.16)

## 2021-07-28 PROCEDURE — 80053 COMPREHEN METABOLIC PANEL: CPT

## 2021-07-28 PROCEDURE — 85027 COMPLETE CBC AUTOMATED: CPT

## 2021-08-17 ENCOUNTER — TELEPHONE (OUTPATIENT)
Dept: FAMILY MEDICINE CLINIC | Facility: CLINIC | Age: 66
End: 2021-08-17

## 2021-08-17 NOTE — TELEPHONE ENCOUNTER
Spoke with patient regarding Donzanee Deep results and advised him to get his repeat labs - stated he is feeling 100% better but will get the labs done next week  He flys in from [place] on Monday night

## 2021-08-26 ENCOUNTER — APPOINTMENT (OUTPATIENT)
Dept: LAB | Facility: MEDICAL CENTER | Age: 66
End: 2021-08-26
Payer: COMMERCIAL

## 2021-08-26 DIAGNOSIS — R50.81 FEVER IN OTHER DISEASES: ICD-10-CM

## 2021-08-26 DIAGNOSIS — R51.9 ACUTE NONINTRACTABLE HEADACHE, UNSPECIFIED HEADACHE TYPE: ICD-10-CM

## 2021-08-26 LAB
ERYTHROCYTE [DISTWIDTH] IN BLOOD BY AUTOMATED COUNT: 14.6 % (ref 11.6–15.1)
HCT VFR BLD AUTO: 39.8 % (ref 36.5–49.3)
HGB BLD-MCNC: 13.2 G/DL (ref 12–17)
MCH RBC QN AUTO: 32.6 PG (ref 26.8–34.3)
MCHC RBC AUTO-ENTMCNC: 33.2 G/DL (ref 31.4–37.4)
MCV RBC AUTO: 98 FL (ref 82–98)
NRBC BLD AUTO-RTO: 0 /100 WBCS
PLATELET # BLD AUTO: 281 THOUSANDS/UL (ref 149–390)
PMV BLD AUTO: 9.8 FL (ref 8.9–12.7)
RBC # BLD AUTO: 4.05 MILLION/UL (ref 3.88–5.62)
WBC # BLD AUTO: 6.65 THOUSAND/UL (ref 4.31–10.16)

## 2021-08-26 PROCEDURE — 36415 COLL VENOUS BLD VENIPUNCTURE: CPT

## 2021-08-26 PROCEDURE — 85027 COMPLETE CBC AUTOMATED: CPT

## 2021-08-26 PROCEDURE — 85007 BL SMEAR W/DIFF WBC COUNT: CPT

## 2021-08-27 LAB
ANISOCYTOSIS BLD QL SMEAR: PRESENT
IMM EOSINOPHIL NFR BLD MANUAL: 3 % (ref 0–6)
LYMPHOCYTES NFR BLD: 21 % (ref 14–44)
MONOCYTES NFR BLD AUTO: 5 % (ref 4–12)
NEUTS SEG NFR BLD AUTO: 66 % (ref 45–77)
PLATELET BLD QL SMEAR: ADEQUATE
TOTAL CELLS COUNTED SPEC: 100
VARIANT LYMPHS BLD QL SMEAR: 5 % (ref 0–0)

## 2021-09-17 DIAGNOSIS — I10 ESSENTIAL HYPERTENSION: ICD-10-CM

## 2021-09-17 RX ORDER — LISINOPRIL 5 MG/1
TABLET ORAL
Qty: 90 TABLET | Refills: 1 | Status: SHIPPED | OUTPATIENT
Start: 2021-09-17 | End: 2022-04-08

## 2021-09-21 DIAGNOSIS — E78.2 MIXED HYPERLIPIDEMIA: ICD-10-CM

## 2021-09-21 RX ORDER — EZETIMIBE AND SIMVASTATIN 10; 10 MG/1; MG/1
TABLET ORAL
Qty: 90 TABLET | Refills: 1 | Status: SHIPPED | OUTPATIENT
Start: 2021-09-21 | End: 2022-04-15

## 2021-10-06 ENCOUNTER — OFFICE VISIT (OUTPATIENT)
Dept: PODIATRY | Facility: CLINIC | Age: 66
End: 2021-10-06
Payer: COMMERCIAL

## 2021-10-06 VITALS
DIASTOLIC BLOOD PRESSURE: 74 MMHG | BODY MASS INDEX: 26.11 KG/M2 | SYSTOLIC BLOOD PRESSURE: 138 MMHG | HEIGHT: 73 IN | WEIGHT: 197 LBS

## 2021-10-06 DIAGNOSIS — M79.672 LEFT FOOT PAIN: ICD-10-CM

## 2021-10-06 DIAGNOSIS — M79.671 RIGHT FOOT PAIN: ICD-10-CM

## 2021-10-06 DIAGNOSIS — M20.21 HALLUX RIGIDUS OF RIGHT FOOT: Primary | ICD-10-CM

## 2021-10-06 DIAGNOSIS — M20.22 HALLUX RIGIDUS OF LEFT FOOT: ICD-10-CM

## 2021-10-06 PROCEDURE — 20550 NJX 1 TENDON SHEATH/LIGAMENT: CPT | Performed by: PODIATRIST

## 2021-10-06 RX ORDER — LIDOCAINE HYDROCHLORIDE 10 MG/ML
1 INJECTION, SOLUTION EPIDURAL; INFILTRATION; INTRACAUDAL; PERINEURAL ONCE
Status: COMPLETED | OUTPATIENT
Start: 2021-10-06 | End: 2021-10-06

## 2021-10-06 RX ORDER — TRIAMCINOLONE ACETONIDE 40 MG/ML
20 INJECTION, SUSPENSION INTRA-ARTICULAR; INTRAMUSCULAR ONCE
Status: COMPLETED | OUTPATIENT
Start: 2021-10-06 | End: 2021-10-06

## 2021-10-06 RX ADMIN — LIDOCAINE HYDROCHLORIDE 1 ML: 10 INJECTION, SOLUTION EPIDURAL; INFILTRATION; INTRACAUDAL; PERINEURAL at 14:14

## 2021-10-06 RX ADMIN — TRIAMCINOLONE ACETONIDE 20 MG: 40 INJECTION, SUSPENSION INTRA-ARTICULAR; INTRAMUSCULAR at 14:15

## 2021-10-26 ENCOUNTER — IMMUNIZATIONS (OUTPATIENT)
Dept: FAMILY MEDICINE CLINIC | Facility: CLINIC | Age: 66
End: 2021-10-26
Payer: COMMERCIAL

## 2021-10-26 DIAGNOSIS — Z23 NEED FOR INFLUENZA VACCINATION: Primary | ICD-10-CM

## 2021-10-26 PROCEDURE — 90471 IMMUNIZATION ADMIN: CPT | Performed by: FAMILY MEDICINE

## 2021-10-26 PROCEDURE — 90682 RIV4 VACC RECOMBINANT DNA IM: CPT | Performed by: FAMILY MEDICINE

## 2021-11-17 ENCOUNTER — OFFICE VISIT (OUTPATIENT)
Dept: PODIATRY | Facility: CLINIC | Age: 66
End: 2021-11-17
Payer: COMMERCIAL

## 2021-11-17 VITALS
BODY MASS INDEX: 26.11 KG/M2 | HEIGHT: 73 IN | WEIGHT: 197 LBS | SYSTOLIC BLOOD PRESSURE: 136 MMHG | DIASTOLIC BLOOD PRESSURE: 74 MMHG

## 2021-11-17 DIAGNOSIS — M79.675 PAIN IN TOE OF LEFT FOOT: ICD-10-CM

## 2021-11-17 DIAGNOSIS — L60.0 INGROWN TOENAIL: Primary | ICD-10-CM

## 2021-11-17 PROCEDURE — 99212 OFFICE O/P EST SF 10 MIN: CPT | Performed by: PODIATRIST

## 2021-11-17 PROCEDURE — 1036F TOBACCO NON-USER: CPT | Performed by: PODIATRIST

## 2021-11-17 PROCEDURE — 11730 AVULSION NAIL PLATE SIMPLE 1: CPT | Performed by: PODIATRIST

## 2021-11-17 PROCEDURE — 3008F BODY MASS INDEX DOCD: CPT | Performed by: PODIATRIST

## 2021-11-17 RX ORDER — LIDOCAINE HYDROCHLORIDE 10 MG/ML
1 INJECTION, SOLUTION EPIDURAL; INFILTRATION; INTRACAUDAL; PERINEURAL ONCE
Status: COMPLETED | OUTPATIENT
Start: 2021-11-17 | End: 2021-11-17

## 2021-11-17 RX ORDER — LIDOCAINE HYDROCHLORIDE AND EPINEPHRINE 10; 10 MG/ML; UG/ML
1 INJECTION, SOLUTION INFILTRATION; PERINEURAL ONCE
Status: COMPLETED | OUTPATIENT
Start: 2021-11-17 | End: 2021-11-17

## 2021-11-17 RX ADMIN — LIDOCAINE HYDROCHLORIDE 1 ML: 10 INJECTION, SOLUTION EPIDURAL; INFILTRATION; INTRACAUDAL; PERINEURAL at 15:20

## 2021-11-17 RX ADMIN — LIDOCAINE HYDROCHLORIDE AND EPINEPHRINE 1 ML: 10; 10 INJECTION, SOLUTION INFILTRATION; PERINEURAL at 15:21

## 2021-12-08 ENCOUNTER — TELEPHONE (OUTPATIENT)
Dept: UROLOGY | Facility: AMBULATORY SURGERY CENTER | Age: 66
End: 2021-12-08

## 2021-12-09 ENCOUNTER — TELEMEDICINE (OUTPATIENT)
Dept: UROLOGY | Facility: MEDICAL CENTER | Age: 66
End: 2021-12-09
Payer: COMMERCIAL

## 2021-12-09 DIAGNOSIS — Z85.46 HISTORY OF PROSTATE CANCER: Primary | ICD-10-CM

## 2021-12-09 PROCEDURE — 99213 OFFICE O/P EST LOW 20 MIN: CPT | Performed by: UROLOGY

## 2021-12-17 LAB
ALBUMIN SERPL-MCNC: 4.4 G/DL (ref 3.6–5.1)
ALBUMIN/GLOB SERPL: 1.5 (CALC) (ref 1–2.5)
ALP SERPL-CCNC: 68 U/L (ref 35–144)
ALT SERPL-CCNC: 29 U/L (ref 9–46)
AST SERPL-CCNC: 19 U/L (ref 10–35)
BILIRUB SERPL-MCNC: 0.7 MG/DL (ref 0.2–1.2)
BUN SERPL-MCNC: 22 MG/DL (ref 7–25)
BUN/CREAT SERPL: ABNORMAL (CALC) (ref 6–22)
CALCIUM SERPL-MCNC: 9.8 MG/DL (ref 8.6–10.3)
CHLORIDE SERPL-SCNC: 99 MMOL/L (ref 98–110)
CHOLEST SERPL-MCNC: 216 MG/DL
CHOLEST/HDLC SERPL: 3.1 (CALC)
CO2 SERPL-SCNC: 28 MMOL/L (ref 20–32)
CREAT SERPL-MCNC: 1.04 MG/DL (ref 0.7–1.25)
GLOBULIN SER CALC-MCNC: 2.9 G/DL (CALC) (ref 1.9–3.7)
GLUCOSE SERPL-MCNC: 104 MG/DL (ref 65–99)
HDLC SERPL-MCNC: 69 MG/DL
LDLC SERPL CALC-MCNC: 129 MG/DL (CALC)
NONHDLC SERPL-MCNC: 147 MG/DL (CALC)
POTASSIUM SERPL-SCNC: 5.1 MMOL/L (ref 3.5–5.3)
PROT SERPL-MCNC: 7.3 G/DL (ref 6.1–8.1)
SL AMB EGFR AFRICAN AMERICAN: 88 ML/MIN/1.73M2
SL AMB EGFR NON AFRICAN AMERICAN: 76 ML/MIN/1.73M2
SODIUM SERPL-SCNC: 138 MMOL/L (ref 135–146)
TRIGL SERPL-MCNC: 85 MG/DL
TSH SERPL-ACNC: 1.27 MIU/L (ref 0.4–4.5)

## 2021-12-21 ENCOUNTER — OFFICE VISIT (OUTPATIENT)
Dept: FAMILY MEDICINE CLINIC | Facility: CLINIC | Age: 66
End: 2021-12-21
Payer: COMMERCIAL

## 2021-12-21 VITALS
HEIGHT: 73 IN | HEART RATE: 83 BPM | OXYGEN SATURATION: 99 % | BODY MASS INDEX: 26.03 KG/M2 | DIASTOLIC BLOOD PRESSURE: 84 MMHG | SYSTOLIC BLOOD PRESSURE: 126 MMHG | TEMPERATURE: 97.8 F | WEIGHT: 196.4 LBS

## 2021-12-21 DIAGNOSIS — Z00.00 ANNUAL PHYSICAL EXAM: Primary | ICD-10-CM

## 2021-12-21 DIAGNOSIS — E78.2 MIXED HYPERLIPIDEMIA: ICD-10-CM

## 2021-12-21 PROCEDURE — 3008F BODY MASS INDEX DOCD: CPT | Performed by: FAMILY MEDICINE

## 2021-12-21 PROCEDURE — 99396 PREV VISIT EST AGE 40-64: CPT | Performed by: FAMILY MEDICINE

## 2021-12-21 PROCEDURE — 1036F TOBACCO NON-USER: CPT | Performed by: FAMILY MEDICINE

## 2022-01-26 ENCOUNTER — OFFICE VISIT (OUTPATIENT)
Dept: PODIATRY | Facility: CLINIC | Age: 67
End: 2022-01-26
Payer: COMMERCIAL

## 2022-01-26 VITALS
BODY MASS INDEX: 26.37 KG/M2 | SYSTOLIC BLOOD PRESSURE: 140 MMHG | WEIGHT: 199 LBS | HEIGHT: 73 IN | DIASTOLIC BLOOD PRESSURE: 72 MMHG

## 2022-01-26 DIAGNOSIS — M20.22 HALLUX RIGIDUS OF LEFT FOOT: Primary | ICD-10-CM

## 2022-01-26 PROCEDURE — 20600 DRAIN/INJ JOINT/BURSA W/O US: CPT | Performed by: PODIATRIST

## 2022-01-26 PROCEDURE — 3008F BODY MASS INDEX DOCD: CPT | Performed by: FAMILY MEDICINE

## 2022-01-26 RX ORDER — TRIAMCINOLONE ACETONIDE 40 MG/ML
20 INJECTION, SUSPENSION INTRA-ARTICULAR; INTRAMUSCULAR ONCE
Status: COMPLETED | OUTPATIENT
Start: 2022-01-26 | End: 2022-01-26

## 2022-01-26 RX ORDER — MELOXICAM 15 MG/1
15 TABLET ORAL DAILY
Qty: 90 TABLET | Refills: 0 | Status: SHIPPED | OUTPATIENT
Start: 2022-01-26 | End: 2022-06-21 | Stop reason: ALTCHOICE

## 2022-01-26 RX ORDER — LIDOCAINE HYDROCHLORIDE 10 MG/ML
1 INJECTION, SOLUTION EPIDURAL; INFILTRATION; INTRACAUDAL; PERINEURAL ONCE
Status: COMPLETED | OUTPATIENT
Start: 2022-01-26 | End: 2022-01-26

## 2022-01-26 RX ADMIN — TRIAMCINOLONE ACETONIDE 20 MG: 40 INJECTION, SUSPENSION INTRA-ARTICULAR; INTRAMUSCULAR at 13:31

## 2022-01-26 RX ADMIN — LIDOCAINE HYDROCHLORIDE 1 ML: 10 INJECTION, SOLUTION EPIDURAL; INFILTRATION; INTRACAUDAL; PERINEURAL at 13:31

## 2022-01-26 NOTE — PROGRESS NOTES
Patient presents for assessment of his left foot  Patient states that he was skiing and that afterwards the left great toe joint became very painful  Patient has known hallux rigidus  His last injection in this joint was approximately 3 5 months ago  Patient notes that he has stopped taking his meloxicam   On exam, sharp pain with palpation lateral aspect left 1st MPJ  There was no dorsiflexion present in the left great toe joint due to hallux rigidus  Treatment:  Injected left foot with 0 5 cc Kenalog 40 along with 1 cc 1% xylocaine  A 90 day supply of meloxicam was provided  Patient to call when foot flares  He states that he is considering surgery within the next 6 months  Foot injection     Date/Time 1/26/2022 1:29 PM     Performed by  Gifty Jorgensen DPM     Authorized by Gifty Jorgensen DPM      Universal Protocol   Consent: Verbal consent obtained  Risks and benefits: risks, benefits and alternatives were discussed  Consent given by: patient  Patient understanding: patient states understanding of the procedure being performed  Patient identity confirmed: verbally with patient        Local anesthesia used: yes     Anesthesia   Local anesthesia used: yes  Local Anesthetic: lidocaine 1% without epinephrine     Procedure Details   Procedure Notes: Injected left foot with 0 5 cc Kenalog 40 along with 1 cc 1% xylocaine

## 2022-02-09 ENCOUNTER — CLINICAL SUPPORT (OUTPATIENT)
Dept: FAMILY MEDICINE CLINIC | Facility: CLINIC | Age: 67
End: 2022-02-09

## 2022-02-09 DIAGNOSIS — Z11.52 ENCOUNTER FOR SCREENING FOR COVID-19: Primary | ICD-10-CM

## 2022-02-09 PROCEDURE — U0003 INFECTIOUS AGENT DETECTION BY NUCLEIC ACID (DNA OR RNA); SEVERE ACUTE RESPIRATORY SYNDROME CORONAVIRUS 2 (SARS-COV-2) (CORONAVIRUS DISEASE [COVID-19]), AMPLIFIED PROBE TECHNIQUE, MAKING USE OF HIGH THROUGHPUT TECHNOLOGIES AS DESCRIBED BY CMS-2020-01-R: HCPCS | Performed by: FAMILY MEDICINE

## 2022-02-09 PROCEDURE — U0005 INFEC AGEN DETEC AMPLI PROBE: HCPCS | Performed by: FAMILY MEDICINE

## 2022-02-10 LAB — SARS-COV-2 RNA RESP QL NAA+PROBE: NEGATIVE

## 2022-04-08 DIAGNOSIS — I10 ESSENTIAL HYPERTENSION: ICD-10-CM

## 2022-04-08 RX ORDER — LISINOPRIL 5 MG/1
TABLET ORAL
Qty: 90 TABLET | Refills: 1 | Status: SHIPPED | OUTPATIENT
Start: 2022-04-08

## 2022-04-15 DIAGNOSIS — E78.2 MIXED HYPERLIPIDEMIA: ICD-10-CM

## 2022-04-15 RX ORDER — EZETIMIBE AND SIMVASTATIN 10; 10 MG/1; MG/1
TABLET ORAL
Qty: 90 TABLET | Refills: 1 | Status: SHIPPED | OUTPATIENT
Start: 2022-04-15

## 2022-06-08 ENCOUNTER — OFFICE VISIT (OUTPATIENT)
Dept: PODIATRY | Facility: CLINIC | Age: 67
End: 2022-06-08
Payer: COMMERCIAL

## 2022-06-08 VITALS
DIASTOLIC BLOOD PRESSURE: 84 MMHG | BODY MASS INDEX: 26.37 KG/M2 | HEIGHT: 73 IN | WEIGHT: 199 LBS | HEART RATE: 85 BPM | SYSTOLIC BLOOD PRESSURE: 126 MMHG

## 2022-06-08 DIAGNOSIS — M20.21 HALLUX RIGIDUS OF RIGHT FOOT: ICD-10-CM

## 2022-06-08 DIAGNOSIS — B35.3 TINEA PEDIS OF BOTH FEET: ICD-10-CM

## 2022-06-08 DIAGNOSIS — M20.22 HALLUX RIGIDUS OF LEFT FOOT: Primary | ICD-10-CM

## 2022-06-08 PROCEDURE — 20550 NJX 1 TENDON SHEATH/LIGAMENT: CPT | Performed by: PODIATRIST

## 2022-06-08 PROCEDURE — 99212 OFFICE O/P EST SF 10 MIN: CPT | Performed by: PODIATRIST

## 2022-06-08 RX ORDER — TRIAMCINOLONE ACETONIDE 40 MG/ML
20 INJECTION, SUSPENSION INTRA-ARTICULAR; INTRAMUSCULAR ONCE
Status: COMPLETED | OUTPATIENT
Start: 2022-06-08 | End: 2022-06-08

## 2022-06-08 RX ORDER — LIDOCAINE HYDROCHLORIDE 10 MG/ML
1 INJECTION, SOLUTION EPIDURAL; INFILTRATION; INTRACAUDAL; PERINEURAL ONCE
Status: COMPLETED | OUTPATIENT
Start: 2022-06-08 | End: 2022-06-08

## 2022-06-08 RX ORDER — CLOTRIMAZOLE AND BETAMETHASONE DIPROPIONATE 10; .64 MG/G; MG/G
CREAM TOPICAL 2 TIMES DAILY
Qty: 30 G | Refills: 0 | Status: SHIPPED | OUTPATIENT
Start: 2022-06-08

## 2022-06-08 RX ADMIN — LIDOCAINE HYDROCHLORIDE 1 ML: 10 INJECTION, SOLUTION EPIDURAL; INFILTRATION; INTRACAUDAL; PERINEURAL at 16:36

## 2022-06-08 RX ADMIN — TRIAMCINOLONE ACETONIDE 20 MG: 40 INJECTION, SUSPENSION INTRA-ARTICULAR; INTRAMUSCULAR at 16:37

## 2022-06-08 NOTE — PROGRESS NOTES
Patient presents with recurrence of pain in each great toe joint secondary to hallux rigidus  Cortisone injections or desired in each joint  At his last visit approximately 4 5 months ago the left 1st MPJ was injected  Patient also notes the presence of a rash between the 4th and 5th toes of each foot  On exam, range of motion 1st MPJ bilateral restricted to 5° of dorsiflexion  Dorsal exostosis was present  Interdigital maceration present in the webspace between the 4th and 5th toes of each foot  Treatment:  Injected lateral aspect of each 1st MPJ with 0 5 cc Kenalog 40 along with 1 cc 1% xylocaine  Prescribed Lotrisone cream for b i d  application  Foot injection     Date/Time 6/8/2022 4:32 PM     Performed by  Angely Mcneill DPM     Authorized by Angely Mcneill DPM      Universal Protocol   Consent: Verbal consent obtained  Risks and benefits: risks, benefits and alternatives were discussed  Patient understanding: patient states understanding of the procedure being performed  Patient identity confirmed: verbally with patient        Local anesthesia used: yes     Anesthesia   Local anesthesia used: yes  Local Anesthetic: lidocaine 1% without epinephrine     Procedure Details   Procedure Notes: Injected right foot with 0 5 cc Kenalog 40 along with 1 cc of 1% xylocaine  Foot injection     Date/Time 6/8/2022 4:33 PM     Performed by  Angely Mcneill DPM     Authorized by Angely Mcneill DPM      Universal Protocol   Consent: Verbal consent obtained    Risks and benefits: risks, benefits and alternatives were discussed  Consent given by: patient  Patient understanding: patient states understanding of the procedure being performed  Patient identity confirmed: verbally with patient        Local anesthesia used: yes     Anesthesia   Local anesthesia used: yes  Local Anesthetic: lidocaine 1% without epinephrine     Procedure Details   Procedure Notes: Injected left foot with 0 5 cc Kenalog 40 along with 1 cc 1% xylocaine

## 2022-06-10 LAB
ALBUMIN SERPL-MCNC: 4.8 G/DL (ref 3.6–5.1)
ALBUMIN/GLOB SERPL: 1.7 (CALC) (ref 1–2.5)
ALP SERPL-CCNC: 55 U/L (ref 35–144)
ALT SERPL-CCNC: 27 U/L (ref 9–46)
AST SERPL-CCNC: 20 U/L (ref 10–35)
BILIRUB SERPL-MCNC: 0.8 MG/DL (ref 0.2–1.2)
BUN SERPL-MCNC: 20 MG/DL (ref 7–25)
BUN/CREAT SERPL: NORMAL (CALC) (ref 6–22)
CALCIUM SERPL-MCNC: 10 MG/DL (ref 8.6–10.3)
CHLORIDE SERPL-SCNC: 100 MMOL/L (ref 98–110)
CHOLEST SERPL-MCNC: 216 MG/DL
CHOLEST/HDLC SERPL: 3.1 (CALC)
CO2 SERPL-SCNC: 29 MMOL/L (ref 20–32)
CREAT SERPL-MCNC: 1.01 MG/DL (ref 0.7–1.25)
GLOBULIN SER CALC-MCNC: 2.8 G/DL (CALC) (ref 1.9–3.7)
GLUCOSE SERPL-MCNC: 99 MG/DL (ref 65–99)
HDLC SERPL-MCNC: 70 MG/DL
LDLC SERPL CALC-MCNC: 123 MG/DL (CALC)
NONHDLC SERPL-MCNC: 146 MG/DL (CALC)
POTASSIUM SERPL-SCNC: 4.3 MMOL/L (ref 3.5–5.3)
PROT SERPL-MCNC: 7.6 G/DL (ref 6.1–8.1)
SL AMB EGFR AFRICAN AMERICAN: 91 ML/MIN/1.73M2
SL AMB EGFR NON AFRICAN AMERICAN: 78 ML/MIN/1.73M2
SODIUM SERPL-SCNC: 138 MMOL/L (ref 135–146)
TRIGL SERPL-MCNC: 123 MG/DL
TSH SERPL-ACNC: 2.28 MIU/L (ref 0.4–4.5)

## 2022-06-21 ENCOUNTER — OFFICE VISIT (OUTPATIENT)
Dept: FAMILY MEDICINE CLINIC | Facility: CLINIC | Age: 67
End: 2022-06-21
Payer: COMMERCIAL

## 2022-06-21 VITALS
OXYGEN SATURATION: 98 % | HEART RATE: 73 BPM | SYSTOLIC BLOOD PRESSURE: 128 MMHG | HEIGHT: 73 IN | TEMPERATURE: 97.8 F | BODY MASS INDEX: 25.47 KG/M2 | WEIGHT: 192.2 LBS | DIASTOLIC BLOOD PRESSURE: 62 MMHG

## 2022-06-21 DIAGNOSIS — I10 ESSENTIAL HYPERTENSION: ICD-10-CM

## 2022-06-21 DIAGNOSIS — E78.5 HYPERLIPIDEMIA, UNSPECIFIED HYPERLIPIDEMIA TYPE: Primary | ICD-10-CM

## 2022-06-21 DIAGNOSIS — E78.2 MIXED HYPERLIPIDEMIA: ICD-10-CM

## 2022-06-21 DIAGNOSIS — Z13.0 SCREENING FOR DEFICIENCY ANEMIA: ICD-10-CM

## 2022-06-21 DIAGNOSIS — C61 MALIGNANT NEOPLASM PROSTATE (HCC): ICD-10-CM

## 2022-06-21 PROCEDURE — 1036F TOBACCO NON-USER: CPT | Performed by: FAMILY MEDICINE

## 2022-06-21 PROCEDURE — 3008F BODY MASS INDEX DOCD: CPT | Performed by: FAMILY MEDICINE

## 2022-06-21 PROCEDURE — 99214 OFFICE O/P EST MOD 30 MIN: CPT | Performed by: FAMILY MEDICINE

## 2022-06-21 PROCEDURE — 3725F SCREEN DEPRESSION PERFORMED: CPT | Performed by: FAMILY MEDICINE

## 2022-06-21 NOTE — PROGRESS NOTES
[facility]      NAME: Kathie Iniguez  AGE: 61 y o  SEX: male  : 1955   MRN: [id number]    DATE: 2022  TIME: 9:53 AM    Assessment and Plan     Problem List Items Addressed This Visit     Malignant neoplasm prostate ([facility] [address] )     PSA remains undetectable  Continue regular follow-up with Urology  Hyperlipidemia - Primary     Stable  LDL is greater than 100 however HDL is 70  Continue Vytorin 10/10  Relevant Orders    Comprehensive metabolic panel    Lipid Panel with Direct LDL reflex    TSH, 3rd generation    Essential hypertension     Well controlled on lisinopril 5 mg daily  Other Visit Diagnoses     Screening for deficiency anemia        Relevant Orders    CBC and differential              Return to office in:  6 months, annual physical    Chief Complaint     Chief Complaint   Patient presents with    Follow-up       History of Present Illness     Patient presents for routine follow-up of chronic medical problems  He is being treated for hyperlipidemia and prostate cancer  He has no new concerns and feels well  The following portions of the patient's history were reviewed and updated as appropriate: allergies, current medications, past family history, past medical history, past social history, past surgical history and problem list     Review of Systems   Review of Systems   Constitutional: Negative  Respiratory: Negative  Cardiovascular: Negative  Gastrointestinal: Negative  Genitourinary: Negative  Musculoskeletal: Negative  Psychiatric/Behavioral: Negative          Active Problem List     Patient Active Problem List   Diagnosis    Arthritis of knee, right    Erectile dysfunction of non-organic origin    Hyperlipidemia    IBS (irritable bowel syndrome)    Vitamin D deficiency    BPH with obstruction/lower urinary tract symptoms    Malignant neoplasm prostate ([address] )    Essential hypertension    Acute cystitis without hematuria    Bilirubin in urine    Fever    Acute nonintractable headache    Hematuria       Objective   /62 (BP Location: Left arm, Patient Position: Sitting, Cuff Size: Standard)   Pulse 73   Temp 97 8 °F (36 6 °C) (Tympanic)   Ht 6' 1" (1 854 m)   Wt 87 2 kg (192 lb 3 2 oz)   SpO2 98%   BMI 25 36 kg/m²     Physical Exam  Vitals and nursing note reviewed  Constitutional:       General: He is not in acute distress  Appearance: He is well-developed  He is not diaphoretic  HENT:      Head: Normocephalic and atraumatic  Eyes:      General:         Right eye: No discharge  Conjunctiva/sclera: Conjunctivae normal       Pupils: Pupils are equal, round, and reactive to light  Neck:      Thyroid: No thyromegaly  Cardiovascular:      Rate and Rhythm: Normal rate and regular rhythm  Pulmonary:      Effort: Pulmonary effort is normal  No respiratory distress  Breath sounds: Normal breath sounds  Musculoskeletal:      Cervical back: Normal range of motion  Lymphadenopathy:      Cervical: No cervical adenopathy  Skin:     General: Skin is warm and dry  Neurological:      Mental Status: He is alert and oriented to person, place, and time  Psychiatric:         Behavior: Behavior normal          Thought Content:  Thought content normal          Judgment: Judgment normal            Current Medications     Current Outpatient Medications:     aspirin (ECOTRIN LOW STRENGTH) 81 mg EC tablet, Take 81 mg by mouth daily, Disp: , Rfl:     clotrimazole-betamethasone (LOTRISONE) 1-0 05 % cream, Apply topically 2 (two) times a day, Disp: 30 g, Rfl: 0    ezetimibe-simvastatin (VYTORIN) 10-10 mg per tablet, TAKE 1 TABLET DAILY, Disp: 90 tablet, Rfl: 1    lisinopril (ZESTRIL) 5 mg tablet, TAKE 1 TABLET BY MOUTH EVERY DAY, Disp: 90 tablet, Rfl: 1    Health Maintenance     Health Maintenance   Topic Date Due    HIV Screening  Never done    DTaP,Tdap,and Td Vaccines (1 - Tdap) Never done   Shivam Beckford COVID-19 Vaccine (4 - Booster for Pfizer series) 05/06/2022    BMI: Followup Plan  06/21/2022    Annual Physical  12/21/2022    Depression Screening  06/21/2023    BMI: Adult  06/21/2023    Colorectal Cancer Screening  11/09/2023    Hepatitis C Screening  Completed    Influenza Vaccine  Completed    Pneumococcal Vaccine: Pediatrics (0 to 5 Years) and At-Risk Patients (6 to 59 Years)  Aged Out    HIB Vaccine  Aged Out    Hepatitis B Vaccine  Aged Out    IPV Vaccine  Aged Out    Hepatitis A Vaccine  Aged Out    Meningococcal ACWY Vaccine  Aged Out    HPV Vaccine  Aged [employer] History   Administered Date(s) Administered    COVID-19 PFIZER VACCINE 0 3 ML IM 06/23/2021, 07/14/2021, 01/06/2022    H1N1, All Formulations 12/28/2009    INFLUENZA 10/03/2016    Influenza Quadrivalent, 6-35 Months IM 10/03/2016, 11/06/2017    Influenza, recombinant, quadrivalent,injectable, preservative free 10/29/2018, 11/11/2019, 09/16/2020, 10/26/2021    Influenza, seasonal, injectable 01/01/2011, 11/08/2012, 11/25/2014, 12/04/2015    Zoster 11/06/2017    Zoster Vaccine Recombinant 09/21/2020, 12/21/2020       Ghada Roque DO  [facility]

## 2022-07-09 ENCOUNTER — LAB (OUTPATIENT)
Dept: LAB | Facility: CLINIC | Age: 67
End: 2022-07-09
Payer: COMMERCIAL

## 2022-07-09 DIAGNOSIS — Z85.46 HISTORY OF PROSTATE CANCER: ICD-10-CM

## 2022-07-09 LAB — PSA SERPL-MCNC: <0.1 NG/ML (ref 0–4)

## 2022-07-09 PROCEDURE — 84153 ASSAY OF PSA TOTAL: CPT

## 2022-07-20 ENCOUNTER — TELEPHONE (OUTPATIENT)
Dept: UROLOGY | Facility: MEDICAL CENTER | Age: 67
End: 2022-07-20

## 2022-07-20 NOTE — TELEPHONE ENCOUNTER
Mustapha Dia MD   7/20/2022  5:10 PM EDT Back to Top        Call pt, normal results, at the 0 level   Repeat in six months         Patient Communication              Attempted to contact patient  No answer left voice message to return call to office

## 2022-07-25 NOTE — TELEPHONE ENCOUNTER
I spoke with pt and provided PSA results per Dr Salomon  Pt has script for six month PSA and advised him someone from our office will call him to schedule his yearly follow up once it gets closer to that time

## 2022-08-15 DIAGNOSIS — U07.1 COVID-19: Primary | ICD-10-CM

## 2022-08-15 RX ORDER — METHYLPREDNISOLONE 4 MG/1
TABLET ORAL
Qty: 21 EACH | Refills: 0 | Status: SHIPPED | OUTPATIENT
Start: 2022-08-15 | End: 2023-02-02

## 2022-10-03 DIAGNOSIS — E78.2 MIXED HYPERLIPIDEMIA: ICD-10-CM

## 2022-10-03 RX ORDER — EZETIMIBE AND SIMVASTATIN 10; 10 MG/1; MG/1
1 TABLET ORAL DAILY
Qty: 90 TABLET | Refills: 0 | Status: SHIPPED | OUTPATIENT
Start: 2022-10-03

## 2022-10-12 PROBLEM — N30.00 ACUTE CYSTITIS WITHOUT HEMATURIA: Status: RESOLVED | Noted: 2021-07-17 | Resolved: 2022-10-12

## 2022-10-12 PROBLEM — R50.9 FEVER: Status: RESOLVED | Noted: 2021-07-17 | Resolved: 2022-10-12

## 2022-10-19 DIAGNOSIS — Z23 NEED FOR VACCINATION: Primary | ICD-10-CM

## 2022-10-19 DIAGNOSIS — I10 ESSENTIAL HYPERTENSION: ICD-10-CM

## 2022-10-19 RX ORDER — LISINOPRIL 5 MG/1
TABLET ORAL
Qty: 90 TABLET | Refills: 1 | Status: SHIPPED | OUTPATIENT
Start: 2022-10-19

## 2022-10-25 ENCOUNTER — CLINICAL SUPPORT (OUTPATIENT)
Dept: FAMILY MEDICINE CLINIC | Facility: CLINIC | Age: 67
End: 2022-10-25
Payer: COMMERCIAL

## 2022-10-25 DIAGNOSIS — Z23 NEED FOR VACCINATION: ICD-10-CM

## 2022-10-25 PROCEDURE — 90715 TDAP VACCINE 7 YRS/> IM: CPT | Performed by: FAMILY MEDICINE

## 2022-10-25 PROCEDURE — 90471 IMMUNIZATION ADMIN: CPT | Performed by: FAMILY MEDICINE

## 2022-10-25 PROCEDURE — 90472 IMMUNIZATION ADMIN EACH ADD: CPT | Performed by: FAMILY MEDICINE

## 2022-10-25 PROCEDURE — 90677 PCV20 VACCINE IM: CPT | Performed by: FAMILY MEDICINE

## 2022-11-07 ENCOUNTER — DASHBOARD ENCOUNTERS (OUTPATIENT)
Age: 67
End: 2022-11-07

## 2022-11-07 ENCOUNTER — OFFICE VISIT (OUTPATIENT)
Dept: URBAN - METROPOLITAN AREA CLINIC 54 | Facility: CLINIC | Age: 67
End: 2022-11-07
Payer: MEDICARE

## 2022-11-07 ENCOUNTER — WEB ENCOUNTER (OUTPATIENT)
Dept: URBAN - METROPOLITAN AREA CLINIC 54 | Facility: CLINIC | Age: 67
End: 2022-11-07

## 2022-11-07 VITALS
HEIGHT: 64 IN | BODY MASS INDEX: 25.16 KG/M2 | SYSTOLIC BLOOD PRESSURE: 152 MMHG | HEART RATE: 70 BPM | WEIGHT: 147.4 LBS | DIASTOLIC BLOOD PRESSURE: 99 MMHG | TEMPERATURE: 97.6 F

## 2022-11-07 DIAGNOSIS — R03.0 ELEVATED BLOOD PRESSURE READING IN OFFICE WITHOUT DIAGNOSIS OF HYPERTENSION: ICD-10-CM

## 2022-11-07 DIAGNOSIS — Z12.11 COLON CANCER SCREENING: ICD-10-CM

## 2022-11-07 DIAGNOSIS — R01.1 HEART MURMUR: ICD-10-CM

## 2022-11-07 PROCEDURE — 99204 OFFICE O/P NEW MOD 45 MIN: CPT

## 2022-11-07 PROCEDURE — 99244 OFF/OP CNSLTJ NEW/EST MOD 40: CPT

## 2022-11-07 RX ORDER — SODIUM, POTASSIUM,MAG SULFATES 17.5-3.13G
TAKE 354 ML AS DIRECTED SOLUTION, RECONSTITUTED, ORAL ORAL
Qty: 354 ML | Refills: 0 | OUTPATIENT
Start: 2022-11-07 | End: 2022-11-08

## 2022-11-07 NOTE — HPI-TODAY'S VISIT:
11/7/22: Patient was referred by Dr. Scottie Navarro for colon cancer screening. A copy of this document will be sent to the physician. Patient is a healthy 66 yo male who presents for surveillance colonoscopy. Pt states last colonoscopy was about 6 years in [place], and he was told to have a repeat in 5 years. Pt states he did not have any polyps and has no family hx of CRC. Denies any symptoms of concern. No changes in bowel habits, melena, rectal bleeding, abdominal pain, or unintentional weight loss. Patient denies cardiopulmonary disease and does not have a pacemaker or defibrillator. Denies use of anticoagulants or antiplatelets.

## 2022-12-06 ENCOUNTER — OFFICE VISIT (OUTPATIENT)
Dept: URBAN - METROPOLITAN AREA SURGERY CENTER 14 | Facility: SURGERY CENTER | Age: 67
End: 2022-12-06
Payer: MEDICARE

## 2022-12-06 DIAGNOSIS — Z12.11 COLON CANCER SCREENING: ICD-10-CM

## 2022-12-06 PROCEDURE — G0121 COLON CA SCRN NOT HI RSK IND: HCPCS | Performed by: INTERNAL MEDICINE

## 2022-12-06 PROCEDURE — G8907 PT DOC NO EVENTS ON DISCHARG: HCPCS | Performed by: INTERNAL MEDICINE

## 2022-12-21 ENCOUNTER — OFFICE VISIT (OUTPATIENT)
Dept: PODIATRY | Facility: CLINIC | Age: 67
End: 2022-12-21

## 2022-12-21 VITALS
SYSTOLIC BLOOD PRESSURE: 130 MMHG | WEIGHT: 202 LBS | HEIGHT: 73 IN | BODY MASS INDEX: 26.77 KG/M2 | DIASTOLIC BLOOD PRESSURE: 72 MMHG

## 2022-12-21 DIAGNOSIS — M20.22 HALLUX RIGIDUS OF LEFT FOOT: Primary | ICD-10-CM

## 2022-12-21 DIAGNOSIS — M20.21 HALLUX RIGIDUS OF RIGHT FOOT: ICD-10-CM

## 2022-12-21 RX ORDER — TRIAMCINOLONE ACETONIDE 40 MG/ML
20 INJECTION, SUSPENSION INTRA-ARTICULAR; INTRAMUSCULAR ONCE
Status: COMPLETED | OUTPATIENT
Start: 2022-12-21 | End: 2022-12-21

## 2022-12-21 RX ORDER — LIDOCAINE HYDROCHLORIDE 10 MG/ML
1 INJECTION, SOLUTION EPIDURAL; INFILTRATION; INTRACAUDAL; PERINEURAL ONCE
Status: COMPLETED | OUTPATIENT
Start: 2022-12-21 | End: 2022-12-21

## 2022-12-21 RX ADMIN — LIDOCAINE HYDROCHLORIDE 1 ML: 10 INJECTION, SOLUTION EPIDURAL; INFILTRATION; INTRACAUDAL; PERINEURAL at 08:57

## 2022-12-21 RX ADMIN — TRIAMCINOLONE ACETONIDE 20 MG: 40 INJECTION, SUSPENSION INTRA-ARTICULAR; INTRAMUSCULAR at 08:57

## 2022-12-21 NOTE — PROGRESS NOTES
Patient again presents with pain in each great toe joint secondary to hallux rigidus  Patient last had cortisone injections 6 months ago  He typically gets relief for approximately 4 5 months  On exam, range of motion first MPJ bilateral restricted to 5 degrees of dorsiflexion  Motion is very painful  Pain with palpation lateral aspect each first MPJ  Treatment consisted of injecting lateral aspect first MPJ bilateral with 0 5 cc Kenalog 40 along with 1 cc 1% Xylocaine  The left foot is more painful than the right  Patient contemplating surgery in May  We have x-rays denoting severe arthritis in each great toe joint in 2020  Patient will be reassessed in 4 months  Foot injection     Date/Time 12/21/2022 9:03 AM     Performed by  Trell Guerrero DPM     Authorized by Trell Guerrero DPM      Universal Protocol   Consent: Verbal consent obtained  Consent given by: patient  Patient understanding: patient states understanding of the procedure being performed  Patient identity confirmed: verbally with patient        Local anesthesia used: yes     Anesthesia   Local anesthesia used: yes  Local Anesthetic: lidocaine 1% without epinephrine     Procedure Details   Procedure Notes: Injected right foot with 0 5 cc Kenalog 40 along with 1 cc 1% Xylocaine  Foot injection     Date/Time 12/21/2022 9:04 AM     Performed by  Trell Guerrero DPM     Authorized by Trell Guerrero DPM      Universal Protocol   Consent: Verbal consent obtained    Risks and benefits: risks, benefits and alternatives were discussed  Consent given by: patient  Patient understanding: patient states understanding of the procedure being performed  Patient identity confirmed: verbally with patient        Local anesthesia used: yes     Anesthesia   Local anesthesia used: yes  Local Anesthetic: lidocaine 1% without epinephrine     Procedure Details   Procedure Notes: Injected left foot with 0 5 cc Kenalog 40 along with 1 cc 1% Xylocaine

## 2023-01-03 LAB
ALBUMIN SERPL-MCNC: 4.4 G/DL (ref 3.6–5.1)
ALBUMIN/GLOB SERPL: 1.6 (CALC) (ref 1–2.5)
ALP SERPL-CCNC: 61 U/L (ref 35–144)
ALT SERPL-CCNC: 41 U/L (ref 9–46)
AST SERPL-CCNC: 25 U/L (ref 10–35)
BASOPHILS # BLD AUTO: 61 CELLS/UL (ref 0–200)
BASOPHILS NFR BLD AUTO: 0.8 %
BILIRUB SERPL-MCNC: 0.9 MG/DL (ref 0.2–1.2)
BUN SERPL-MCNC: 25 MG/DL (ref 7–25)
BUN/CREAT SERPL: NORMAL (CALC) (ref 6–22)
CALCIUM SERPL-MCNC: 9.8 MG/DL (ref 8.6–10.3)
CHLORIDE SERPL-SCNC: 100 MMOL/L (ref 98–110)
CHOLEST SERPL-MCNC: 208 MG/DL
CHOLEST/HDLC SERPL: 2.9 (CALC)
CO2 SERPL-SCNC: 28 MMOL/L (ref 20–32)
CREAT SERPL-MCNC: 1.05 MG/DL (ref 0.7–1.35)
EOSINOPHIL # BLD AUTO: 152 CELLS/UL (ref 15–500)
EOSINOPHIL NFR BLD AUTO: 2 %
ERYTHROCYTE [DISTWIDTH] IN BLOOD BY AUTOMATED COUNT: 12 % (ref 11–15)
GFR/BSA.PRED SERPLBLD CYS-BASED-ARV: 79 ML/MIN/1.73M2
GLOBULIN SER CALC-MCNC: 2.8 G/DL (CALC) (ref 1.9–3.7)
GLUCOSE SERPL-MCNC: 94 MG/DL (ref 65–99)
HCT VFR BLD AUTO: 45.1 % (ref 38.5–50)
HDLC SERPL-MCNC: 71 MG/DL
HGB BLD-MCNC: 15 G/DL (ref 13.2–17.1)
LDLC SERPL CALC-MCNC: 113 MG/DL (CALC)
LYMPHOCYTES # BLD AUTO: 2516 CELLS/UL (ref 850–3900)
LYMPHOCYTES NFR BLD AUTO: 33.1 %
MCH RBC QN AUTO: 30.4 PG (ref 27–33)
MCHC RBC AUTO-ENTMCNC: 33.3 G/DL (ref 32–36)
MCV RBC AUTO: 91.5 FL (ref 80–100)
MONOCYTES # BLD AUTO: 889 CELLS/UL (ref 200–950)
MONOCYTES NFR BLD AUTO: 11.7 %
NEUTROPHILS # BLD AUTO: 3982 CELLS/UL (ref 1500–7800)
NEUTROPHILS NFR BLD AUTO: 52.4 %
NONHDLC SERPL-MCNC: 137 MG/DL (CALC)
PLATELET # BLD AUTO: 228 THOUSAND/UL (ref 140–400)
PMV BLD REES-ECKER: 10.8 FL (ref 7.5–12.5)
POTASSIUM SERPL-SCNC: 4.3 MMOL/L (ref 3.5–5.3)
PROT SERPL-MCNC: 7.2 G/DL (ref 6.1–8.1)
RBC # BLD AUTO: 4.93 MILLION/UL (ref 4.2–5.8)
SODIUM SERPL-SCNC: 137 MMOL/L (ref 135–146)
TRIGL SERPL-MCNC: 129 MG/DL
TSH SERPL-ACNC: 1.61 MIU/L (ref 0.4–4.5)
WBC # BLD AUTO: 7.6 THOUSAND/UL (ref 3.8–10.8)

## 2023-01-04 ENCOUNTER — OFFICE VISIT (OUTPATIENT)
Dept: FAMILY MEDICINE CLINIC | Facility: CLINIC | Age: 68
End: 2023-01-04

## 2023-01-04 VITALS
HEART RATE: 92 BPM | OXYGEN SATURATION: 98 % | SYSTOLIC BLOOD PRESSURE: 134 MMHG | WEIGHT: 198.4 LBS | BODY MASS INDEX: 26.29 KG/M2 | DIASTOLIC BLOOD PRESSURE: 82 MMHG | TEMPERATURE: 99.5 F | HEIGHT: 73 IN

## 2023-01-04 DIAGNOSIS — E78.5 HYPERLIPIDEMIA, UNSPECIFIED HYPERLIPIDEMIA TYPE: ICD-10-CM

## 2023-01-04 DIAGNOSIS — I10 ESSENTIAL HYPERTENSION: ICD-10-CM

## 2023-01-04 DIAGNOSIS — N13.8 BPH WITH OBSTRUCTION/LOWER URINARY TRACT SYMPTOMS: Primary | ICD-10-CM

## 2023-01-04 DIAGNOSIS — C61 MALIGNANT NEOPLASM PROSTATE (HCC): ICD-10-CM

## 2023-01-04 DIAGNOSIS — N40.1 BPH WITH OBSTRUCTION/LOWER URINARY TRACT SYMPTOMS: Primary | ICD-10-CM

## 2023-01-04 NOTE — PROGRESS NOTES
Name: Karen Booker      : 1955      MRN: [id number]  Encounter Provider: Manda Muhammad DO  Encounter Date: 2023   Encounter department: [address] [place] [address]     1  BPH with obstruction/lower urinary tract symptoms    2  Essential hypertension  Assessment & Plan:  Well-controlled on lisinopril 5 mg daily      3  Hyperlipidemia, unspecified hyperlipidemia type  Assessment & Plan:  Lipids stable with LDL cholesterol less than 120 and HDL greater than 70  Continue Vytorin 10/10    Orders:  -     Comprehensive metabolic panel; Future; Expected date: 2023  -     Lipid Panel with Direct LDL reflex; Future; Expected date: 2023  -     TSH, 3rd generation; Future; Expected date: 2023  -     Comprehensive metabolic panel  -     Lipid Panel with Direct LDL reflex  -     TSH, 3rd generation    4  Malignant neoplasm prostate [facility])  Assessment & Plan:  PSA remains in the undetectable range  Continue follow-up with   Urology  Subjective      Patient was seen for routine follow-up of chronic medical problems  He is being treated for hyperlipidemia and hypertension  He follows with urology for prostate cancer  He has no complaints and feels well  Review of Systems   Constitutional: Negative  Respiratory: Negative  Cardiovascular: Negative  Gastrointestinal: Negative  Genitourinary: Negative  Musculoskeletal: Negative  Psychiatric/Behavioral: Negative          Current Outpatient Medications on File Prior to Visit   Medication Sig   • ezetimibe-simvastatin (VYTORIN) 10-10 mg per tablet Take 1 tablet by mouth daily   • lisinopril (ZESTRIL) 5 mg tablet TAKE 1 TABLET BY MOUTH EVERY DAY   • aspirin (ECOTRIN LOW STRENGTH) 81 mg EC tablet Take 81 mg by mouth daily (Patient not taking: Reported on 2022)   • clotrimazole-betamethasone (LOTRISONE) 1-0 05 % cream Apply topically 2 (two) times a day (Patient not taking: Reported on 1/4/2023)   • methylPREDNISolone 4 MG tablet therapy pack Use as directed on package (Patient not taking: Reported on 12/21/2022)       Objective     /82 (BP Location: Right arm, Patient Position: Sitting, Cuff Size: Adult)   Pulse 92   Temp 99 5 °F (37 5 °C)   Ht 6' 1" (1 854 m)   Wt 90 kg (198 lb 6 4 oz)   SpO2 98%   BMI 26 18 kg/m²     Physical Exam  Vitals and nursing note reviewed  Constitutional:       General: He is not in acute distress  Appearance: He is well-developed  He is not diaphoretic  HENT:      Head: Normocephalic and atraumatic  Eyes:      General:         Right eye: No discharge  Conjunctiva/sclera: Conjunctivae normal       Pupils: Pupils are equal, round, and reactive to light  Neck:      Thyroid: No thyromegaly  Cardiovascular:      Rate and Rhythm: Normal rate and regular rhythm  Pulmonary:      Effort: Pulmonary effort is normal  No respiratory distress  Breath sounds: Normal breath sounds  Musculoskeletal:      Cervical back: Normal range of motion  Lymphadenopathy:      Cervical: No cervical adenopathy  Skin:     General: Skin is warm and dry  Neurological:      Mental Status: He is alert and oriented to person, place, and time  Psychiatric:         Behavior: Behavior normal          Thought Content:  Thought content normal          Judgment: Judgment normal        Benson Meeks DO

## 2023-01-09 ENCOUNTER — OFFICE VISIT (OUTPATIENT)
Dept: URBAN - METROPOLITAN AREA CLINIC 54 | Facility: CLINIC | Age: 68
End: 2023-01-09

## 2023-01-13 DIAGNOSIS — E78.2 MIXED HYPERLIPIDEMIA: ICD-10-CM

## 2023-01-13 RX ORDER — EZETIMIBE AND SIMVASTATIN 10; 10 MG/1; MG/1
TABLET ORAL
Qty: 90 TABLET | Refills: 0 | Status: SHIPPED | OUTPATIENT
Start: 2023-01-13

## 2023-01-16 NOTE — PROGRESS NOTES
What to expect after a Nerve Block    Nerve blocks administered to block pain affect many types of nerves, including those nerves that control movement, pain, and normal sensation. Following a nerve block, you may notice some bruising at the site where the block was given. You may experience sensations such as: numbness of the affected area or limb, tingling, heaviness (that is the limb feels heavy to you), weakness or inability to move the affected arm or leg, or a feeling as if your arm or leg has “fallen asleep.”     A nerve block can last from 2 to 36 hours depending on the medications used.  Usually the weakness wears off first followed by the tingling and heaviness. As the block wears off, you may begin to notice pain; however, this sequence of events may occur in any order. Typically, you will be able to move your limb before you will feel it. Once a nerve block begins to wear off, the effects are usually completely gone within 60 minutes.  If you experience continued side effects that you believe are block related for longer than 48 hours, please call your healthcare provider. Please see block-specific instructions below.    Instructions for any Block involving the leg/foot:   If you have had a leg /foot block, you should not bear weight on the affected leg until the block has worn off. After the block has worn off, weight bearing should be as directed by your surgeon. You may be sent home with crutches. You are at high risk for falling because of the anesthetic effects on your leg. Please use caution when standing or trying to move or walk. Have someone assist you until your leg and foot function have returned to normal.     Protection of a “blocked” limb  After a nerve block, you cannot feel pain, pressure, or extremes of temperature in the affected limb. And because of this, your blocked limb is at more risk for injury. For example, it is possible to burn your limb on an extremely hot surface without  Procedures  Patient returns for Parra removal  Parra removed without difficulty, patient tolerated procedure well  Urine draining clear yellow  Denies fever or urinary symptoms  Patient instructed to increase fluids, especially water, and limit caffeine intake  To return to office if unable to void within 4-6 hours, or has increased discomfort  feeling it.     When resting, it is important to reposition your limb periodically to avoid prolonged pressure on it. This may require the use of pillows and padding.    While sleeping, you should avoid rolling onto the affected limb or putting too much pressure on it.     If you have a cast or tight dressing, check the color of your fingers or toes of the affected limb. Call your surgeon if they look discolored (that is, dusky, dark colored).    Use caution in cold weather. Cover your limb appropriately to protect it from the cold.    Pain Management:  Your surgeon will give you a prescription for pain medication. Begin taking this before the nerve block wears off. Bear in mind that sometimes the block can wear off in the middle of the night.

## 2023-02-02 ENCOUNTER — OFFICE VISIT (OUTPATIENT)
Dept: UROLOGY | Facility: MEDICAL CENTER | Age: 68
End: 2023-02-02

## 2023-02-02 VITALS
HEART RATE: 112 BPM | HEIGHT: 73 IN | DIASTOLIC BLOOD PRESSURE: 64 MMHG | WEIGHT: 196 LBS | OXYGEN SATURATION: 98 % | SYSTOLIC BLOOD PRESSURE: 130 MMHG | BODY MASS INDEX: 25.98 KG/M2

## 2023-02-02 DIAGNOSIS — Z85.46 HISTORY OF PROSTATE CANCER: Primary | ICD-10-CM

## 2023-02-02 NOTE — PROGRESS NOTES
HISTORY:    Follow-up prostate cancer    Underwent radical prostatectomy in October 2018  Has done well since that time with PSA at the 0 level ever since      Good urinary stream and control, a few drops of postvoid dribbling, no real stress incontinence  ASSESSMENT / PLAN:    Doing well    Check PSA in 6 months with phone report    Follow-up 1 year    The following portions of the patient's history were reviewed and updated as appropriate: allergies, current medications, past family history, past medical history, past social history, past surgical history and problem list     Review of Systems   All other systems reviewed and are negative  Objective:     Physical Exam  Constitutional:       General: He is not in acute distress  Appearance: He is well-developed  He is not diaphoretic  HENT:      Head: Normocephalic and atraumatic  Eyes:      General: No scleral icterus  Pulmonary:      Effort: Pulmonary effort is normal    Genitourinary:     Comments: Penis testes normal  Skin:     Coloration: Skin is not pale  Neurological:      Mental Status: He is alert and oriented to person, place, and time  Psychiatric:         Behavior: Behavior normal          Thought Content: Thought content normal          Judgment: Judgment normal            0   Lab Value Date/Time    PSA <0 1 07/09/2022 1124    PSA <0 1 12/02/2020 1137    PSA <0 1 04/30/2020 0909    PSA <0 1 11/01/2019 0800    PSA <0 1 04/20/2019 1039   ]  BUN   Date Value Ref Range Status   01/03/2023 25 7 - 25 mg/dL Final     Creatinine   Date Value Ref Range Status   01/03/2023 1 05 0 70 - 1 35 mg/dL Final   07/28/2021 0 99 0 60 - 1 30 mg/dL Final     Comment:     Standardized to IDMS reference method   10/24/2017 1 05 0 70 - 1 25 mg/dL Final     Comment:     Result Comment: For patients >52years of age, the reference limit  for Creatinine is approximately 13% higher for people  identified as -American         No components found for: CBC      Patient Active Problem List   Diagnosis   • Arthritis of knee, right   • Erectile dysfunction of non-organic origin   • Hyperlipidemia   • IBS (irritable bowel syndrome)   • Vitamin D deficiency   • BPH with obstruction/lower urinary tract symptoms   • Malignant neoplasm prostate ([address] )   • Essential hypertension   • Bilirubin in urine   • Acute nonintractable headache   • Hematuria        Diagnoses and all orders for this visit:    History of prostate cancer  -     PSA Total, Diagnostic; Future           Patient ID: Guy Cheng is a 74 y o  male        Current Outpatient Medications:   •  ezetimibe-simvastatin (VYTORIN) 10-10 mg per tablet, TAKE 1 TABLET DAILY, Disp: 90 tablet, Rfl: 0  •  lisinopril (ZESTRIL) 5 mg tablet, TAKE 1 TABLET BY MOUTH EVERY DAY, Disp: 90 tablet, Rfl: 1  •  clotrimazole-betamethasone (LOTRISONE) 1-0 05 % cream, Apply topically 2 (two) times a day (Patient not taking: Reported on 1/4/2023), Disp: 30 g, Rfl: 0    Past Medical History:   Diagnosis Date   • Abnormal cardiovascular stress test     last assessed-11/13/2015   • Abnormal electrocardiogram     last assessed-11/2/2015   • Arthritis    • Cancer [facility])     prostate   • COVID-19 3/23/2021   • Elevated PSA    • Encounter for screening colonoscopy for non-high-risk patient     family hx possible of colon ca   • Environmental and seasonal allergies    • Hallux rigidus     last assessed-4/24/2014   • History of prostate cancer 12/3/2020   • Hyperlipidemia    • Hypertension    • Ingrown toenail    • Irregular heart beat     "rapid heart beat episode X1" - tested - no issue   • Organic impotence     last assessed-4/24/2014   • Rapid pulse     resolved-1/19/2016   • Tachycardia     last assessed-11/2/2015   • Wears glasses        Past Surgical History:   Procedure Laterality Date   • APPENDECTOMY     • BACK SURGERY      L4-L5   • COLONOSCOPY      X 3-4    • HERNIA REPAIR     • OTHER SURGICAL HISTORY      spinal diskectomy • IA COLONOSCOPY FLX DX W/COLLJ SPEC WHEN PFRMD N/A 9/30/2016    Procedure: COLONOSCOPY;  Surgeon: Hawa Holly MD;  Location: [facility];   Service: Colorectal   • IA LAPAROSCOPIC APPENDECTOMY N/A 12/21/2019    Procedure: APPENDECTOMY LAPAROSCOPIC;  Surgeon: Mahamed Salazar MD;  [facility];  Service: General   • IA LAPS SURG [id number] RPBIC RAD W/NRV SPARING ROBOT N/A 10/29/2018    Procedure: PROSTATECTOMY RADICAL W ROBOTICS; BILATERAL PELVIC LYMPH NODE DISSECTION; LYSIS OF ADHESIONS;  Surgeon: Juan Valerio MD;  [facility];  Service: Urology   • IA RPR 1ST INGUN HRNA AGE 5 YRS/> REDUCIBLE Left 1/10/2020    Procedure: LAPAROSCOPIC INGUINAL HERNIA REPAIR WITH MESH;  Surgeon: Mahamed Salazar MD;  [facility];  Service: General   • PROSTATE BIOPSY Bilateral 08/2018   • REDUCTION OF TORSION OF TESTIS  2001   • VASECTOMY     • WISDOM TOOTH EXTRACTION         Social History

## 2023-04-22 DIAGNOSIS — E78.2 MIXED HYPERLIPIDEMIA: ICD-10-CM

## 2023-04-22 DIAGNOSIS — I10 ESSENTIAL HYPERTENSION: ICD-10-CM

## 2023-04-24 RX ORDER — EZETIMIBE AND SIMVASTATIN 10; 10 MG/1; MG/1
TABLET ORAL
Qty: 90 TABLET | Refills: 0 | Status: SHIPPED | OUTPATIENT
Start: 2023-04-24

## 2023-04-24 RX ORDER — LISINOPRIL 5 MG/1
TABLET ORAL
Qty: 90 TABLET | Refills: 1 | Status: SHIPPED | OUTPATIENT
Start: 2023-04-24

## 2023-06-08 DIAGNOSIS — K64.9 HEMORRHOIDS, UNSPECIFIED HEMORRHOID TYPE: Primary | ICD-10-CM

## 2023-06-08 RX ORDER — HYDROCORTISONE 25 MG/G
CREAM TOPICAL 2 TIMES DAILY
Qty: 28 G | Refills: 1 | Status: SHIPPED | OUTPATIENT
Start: 2023-06-08

## 2023-06-21 ENCOUNTER — OFFICE VISIT (OUTPATIENT)
Dept: PODIATRY | Facility: CLINIC | Age: 68
End: 2023-06-21
Payer: COMMERCIAL

## 2023-06-21 VITALS
HEART RATE: 83 BPM | DIASTOLIC BLOOD PRESSURE: 84 MMHG | RESPIRATION RATE: 18 BRPM | BODY MASS INDEX: 26.24 KG/M2 | HEIGHT: 73 IN | SYSTOLIC BLOOD PRESSURE: 123 MMHG | WEIGHT: 198 LBS

## 2023-06-21 DIAGNOSIS — M20.21 HALLUX RIGIDUS OF RIGHT FOOT: ICD-10-CM

## 2023-06-21 DIAGNOSIS — M20.22 HALLUX RIGIDUS OF LEFT FOOT: Primary | ICD-10-CM

## 2023-06-21 PROCEDURE — 20550 NJX 1 TENDON SHEATH/LIGAMENT: CPT | Performed by: PODIATRIST

## 2023-06-21 RX ORDER — LIDOCAINE HYDROCHLORIDE 10 MG/ML
1 INJECTION, SOLUTION EPIDURAL; INFILTRATION; INTRACAUDAL; PERINEURAL ONCE
Status: COMPLETED | OUTPATIENT
Start: 2023-06-21 | End: 2023-06-21

## 2023-06-21 RX ORDER — TRIAMCINOLONE ACETONIDE 40 MG/ML
20 INJECTION, SUSPENSION INTRA-ARTICULAR; INTRAMUSCULAR ONCE
Status: COMPLETED | OUTPATIENT
Start: 2023-06-21 | End: 2023-06-21

## 2023-06-21 RX ADMIN — LIDOCAINE HYDROCHLORIDE 1 ML: 10 INJECTION, SOLUTION EPIDURAL; INFILTRATION; INTRACAUDAL; PERINEURAL at 11:00

## 2023-06-21 RX ADMIN — TRIAMCINOLONE ACETONIDE 20 MG: 40 INJECTION, SUSPENSION INTRA-ARTICULAR; INTRAMUSCULAR at 11:09

## 2023-06-21 NOTE — PROGRESS NOTES
Patient again presents with pain in each great toe joint secondary to hallux rigidus  The left foot is more painful than the right  Patient has been comfortable for quite some time until a few weeks ago when pain flared  He plans to go to [place] in the very near future  On exam, range of motion first MPJ bilateral restricted to 5 degrees of dorsiflexion  Sharp pain with palpation of the lateral aspect of the first MPJ bilateral     Treatment: Injected each foot with 0 5 cc Kenalog 40 along with 1 cc 1% Xylocaine  Patient is considering surgery in the near future  He will contact me to schedule a date  Foot injection     Date/Time 6/21/2023 10:15 AM     Performed by  Aleshia Alvarez DPM   Authorized by Aleshia Alvarez DPM     Universal Protocol   Consent: Verbal consent obtained  Risks and benefits: risks, benefits and alternatives were discussed  Consent given by: patient  Patient understanding: patient states understanding of the procedure being performed  Patient identity confirmed: verbally with patient        Local anesthesia used: yes     Anesthesia   Local anesthesia used: yes  Local Anesthetic: lidocaine 1% without epinephrine     Procedure Details   Procedure Notes: Injected right foot with 0 5 cc Kenalog 40 along with 1 cc 1% Xylocaine  Foot injection     Date/Time 6/21/2023 10:15 AM     Performed by  Aleshia Alvarez DPM   Authorized by Aleshia Alvarez DPM     Universal Protocol   Consent: Verbal consent obtained    Risks and benefits: risks, benefits and alternatives were discussed  Consent given by: patient  Patient understanding: patient states understanding of the procedure being performed  Patient identity confirmed: verbally with patient        Local anesthesia used: yes     Anesthesia   Local anesthesia used: yes  Local Anesthetic: lidocaine 1% without epinephrine     Procedure Details   Procedure Notes: Injected left foot with 0 5 cc Kenalog 40 along with 1 cc 1% Xylocaine

## 2023-07-08 DIAGNOSIS — E78.2 MIXED HYPERLIPIDEMIA: ICD-10-CM

## 2023-07-10 RX ORDER — EZETIMIBE AND SIMVASTATIN 10; 10 MG/1; MG/1
TABLET ORAL
Qty: 90 TABLET | Refills: 0 | Status: SHIPPED | OUTPATIENT
Start: 2023-07-10

## 2023-09-20 DIAGNOSIS — E78.2 MIXED HYPERLIPIDEMIA: ICD-10-CM

## 2023-09-20 RX ORDER — EZETIMIBE AND SIMVASTATIN 10; 10 MG/1; MG/1
TABLET ORAL
Qty: 90 TABLET | Refills: 0 | Status: SHIPPED | OUTPATIENT
Start: 2023-09-20

## 2023-09-25 ENCOUNTER — IMMUNIZATIONS (OUTPATIENT)
Dept: FAMILY MEDICINE CLINIC | Facility: CLINIC | Age: 68
End: 2023-09-25
Payer: COMMERCIAL

## 2023-09-25 DIAGNOSIS — E78.2 MIXED HYPERLIPIDEMIA: Primary | ICD-10-CM

## 2023-09-25 DIAGNOSIS — Z13.0 SCREENING FOR DEFICIENCY ANEMIA: ICD-10-CM

## 2023-09-25 DIAGNOSIS — Z23 ENCOUNTER FOR IMMUNIZATION: Primary | ICD-10-CM

## 2023-09-25 DIAGNOSIS — I10 ESSENTIAL HYPERTENSION: ICD-10-CM

## 2023-09-25 PROCEDURE — 90662 IIV NO PRSV INCREASED AG IM: CPT

## 2023-09-25 PROCEDURE — G0008 ADMIN INFLUENZA VIRUS VAC: HCPCS

## 2023-09-26 LAB
ALBUMIN SERPL-MCNC: 4.4 G/DL (ref 3.6–5.1)
ALBUMIN/GLOB SERPL: 1.7 (CALC) (ref 1–2.5)
ALP SERPL-CCNC: 68 U/L (ref 35–144)
ALT SERPL-CCNC: 32 U/L (ref 9–46)
AST SERPL-CCNC: 22 U/L (ref 10–35)
BILIRUB SERPL-MCNC: 0.9 MG/DL (ref 0.2–1.2)
BUN SERPL-MCNC: 22 MG/DL (ref 7–25)
BUN/CREAT SERPL: NORMAL (CALC) (ref 6–22)
CALCIUM SERPL-MCNC: 9.5 MG/DL (ref 8.6–10.3)
CHLORIDE SERPL-SCNC: 100 MMOL/L (ref 98–110)
CHOLEST SERPL-MCNC: 186 MG/DL
CHOLEST/HDLC SERPL: 3.2 (CALC)
CO2 SERPL-SCNC: 29 MMOL/L (ref 20–32)
CREAT SERPL-MCNC: 0.98 MG/DL (ref 0.7–1.35)
GFR/BSA.PRED SERPLBLD CYS-BASED-ARV: 85 ML/MIN/1.73M2
GLOBULIN SER CALC-MCNC: 2.6 G/DL (CALC) (ref 1.9–3.7)
GLUCOSE SERPL-MCNC: 96 MG/DL (ref 65–99)
HDLC SERPL-MCNC: 59 MG/DL
LDLC SERPL CALC-MCNC: 103 MG/DL (CALC)
NONHDLC SERPL-MCNC: 127 MG/DL (CALC)
POTASSIUM SERPL-SCNC: 4.5 MMOL/L (ref 3.5–5.3)
PROT SERPL-MCNC: 7 G/DL (ref 6.1–8.1)
SODIUM SERPL-SCNC: 137 MMOL/L (ref 135–146)
TRIGL SERPL-MCNC: 143 MG/DL

## 2023-09-27 ENCOUNTER — OFFICE VISIT (OUTPATIENT)
Dept: FAMILY MEDICINE CLINIC | Facility: CLINIC | Age: 68
End: 2023-09-27
Payer: COMMERCIAL

## 2023-09-27 VITALS
BODY MASS INDEX: 26.08 KG/M2 | TEMPERATURE: 98.5 F | HEIGHT: 73 IN | OXYGEN SATURATION: 97 % | HEART RATE: 100 BPM | SYSTOLIC BLOOD PRESSURE: 130 MMHG | DIASTOLIC BLOOD PRESSURE: 74 MMHG | WEIGHT: 196.8 LBS

## 2023-09-27 DIAGNOSIS — C61 MALIGNANT NEOPLASM PROSTATE (HCC): ICD-10-CM

## 2023-09-27 DIAGNOSIS — I10 ESSENTIAL HYPERTENSION: ICD-10-CM

## 2023-09-27 DIAGNOSIS — N13.8 BPH WITH OBSTRUCTION/LOWER URINARY TRACT SYMPTOMS: Primary | ICD-10-CM

## 2023-09-27 DIAGNOSIS — N40.1 BPH WITH OBSTRUCTION/LOWER URINARY TRACT SYMPTOMS: Primary | ICD-10-CM

## 2023-09-27 DIAGNOSIS — Z13.0 SCREENING FOR DEFICIENCY ANEMIA: ICD-10-CM

## 2023-09-27 DIAGNOSIS — E78.5 HYPERLIPIDEMIA, UNSPECIFIED HYPERLIPIDEMIA TYPE: ICD-10-CM

## 2023-09-27 PROBLEM — R51.9 ACUTE NONINTRACTABLE HEADACHE: Status: RESOLVED | Noted: 2021-07-21 | Resolved: 2023-09-27

## 2023-09-27 PROCEDURE — 99214 OFFICE O/P EST MOD 30 MIN: CPT | Performed by: FAMILY MEDICINE

## 2023-09-27 NOTE — PROGRESS NOTES
Name: Tammy Mullins      : 1955      MRN: [id number]  Encounter Provider: Aura Schirmer, DO  Encounter Date: 2023   Encounter department: [address] [place] [address]     1. BPH with obstruction/lower urinary tract symptoms    2. Essential hypertension  Assessment & Plan:  Blood pressure well controlled on lisinopril 5 mg daily. 3. Hyperlipidemia, unspecified hyperlipidemia type  Assessment & Plan:  Lipids well controlled with LDL cholesterol of 103 and HDL of 59. Continue Vytorin 10/10    Orders:  -     Comprehensive metabolic panel; Future; Expected date: 2024  -     Lipid Panel with Direct LDL reflex; Future; Expected date: 2024  -     TSH, 3rd generation; Future; Expected date: 2024  -     Comprehensive metabolic panel  -     Lipid Panel with Direct LDL reflex  -     TSH, 3rd generation    4. Malignant neoplasm prostate [facility])  Assessment & Plan:  Most recent PSA undetectable. Patient is now 5 years out from his treatment. 5. Screening for deficiency anemia  -     CBC and differential; Future; Expected date: 2024  -     CBC and differential      BMI Counseling: Body mass index is 25.96 kg/m². The BMI is above normal. Nutrition recommendations include decreasing portion sizes, encouraging healthy choices of fruits and vegetables, limiting drinks that contain sugar, moderation in carbohydrate intake and increasing intake of lean protein. Exercise recommendations include exercising 3-5 times per week. Rationale for BMI follow-up plan is due to patient being overweight or obese. Depression Screening and Follow-up Plan: Patient was screened for depression during today's encounter. They screened negative with a PHQ-2 score of 0. Subjective      Patient was seen for routine follow-up of chronic medical problems. He is being treated for hyperlipidemia hypertension and being followed for prostate cancer.   He feels well and has no complaints. Review of Systems   Constitutional: Negative. Respiratory: Negative. Cardiovascular: Negative. Gastrointestinal: Negative. Genitourinary: Negative. Musculoskeletal: Negative. Psychiatric/Behavioral: Negative. Current Outpatient Medications on File Prior to Visit   Medication Sig   • ezetimibe-simvastatin (VYTORIN) 10-10 mg per tablet TAKE 1 TABLET DAILY   • lisinopril (ZESTRIL) 5 mg tablet TAKE 1 TABLET BY MOUTH EVERY DAY   • clotrimazole-betamethasone (LOTRISONE) 1-0.05 % cream Apply topically 2 (two) times a day (Patient not taking: Reported on 1/4/2023)   • hydrocortisone (ANUSOL-HC) 2.5 % rectal cream Apply topically 2 (two) times a day (Patient not taking: Reported on 6/21/2023)       Objective     /74 (BP Location: Right arm, Patient Position: Sitting, Cuff Size: Adult)   Pulse 100   Temp 98.5 °F (36.9 °C)   Ht 6' 1" (1.854 m)   Wt 89.3 kg (196 lb 12.8 oz)   SpO2 97%   BMI 25.96 kg/m²     Physical Exam  Vitals and nursing note reviewed. Constitutional:       General: He is not in acute distress. Appearance: He is well-developed. He is not diaphoretic. HENT:      Head: Normocephalic and atraumatic. Eyes:      General:         Right eye: No discharge. Conjunctiva/sclera: Conjunctivae normal.      Pupils: Pupils are equal, round, and reactive to light. Neck:      Thyroid: No thyromegaly. Cardiovascular:      Rate and Rhythm: Normal rate and regular rhythm. Pulmonary:      Effort: Pulmonary effort is normal. No respiratory distress. Breath sounds: Normal breath sounds. Musculoskeletal:      Cervical back: Normal range of motion. Lymphadenopathy:      Cervical: No cervical adenopathy. Skin:     General: Skin is warm and dry. Neurological:      Mental Status: He is alert and oriented to person, place, and time. Psychiatric:         Behavior: Behavior normal.         Thought Content:  Thought content normal.         Judgment: Judgment normal.       Yen Mcfarland, DO

## 2023-10-20 DIAGNOSIS — I10 ESSENTIAL HYPERTENSION: ICD-10-CM

## 2023-10-20 RX ORDER — LISINOPRIL 5 MG/1
TABLET ORAL
Qty: 90 TABLET | Refills: 1 | Status: SHIPPED | OUTPATIENT
Start: 2023-10-20

## 2023-11-29 ENCOUNTER — OFFICE VISIT (OUTPATIENT)
Dept: PODIATRY | Facility: CLINIC | Age: 68
End: 2023-11-29
Payer: COMMERCIAL

## 2023-11-29 VITALS
DIASTOLIC BLOOD PRESSURE: 85 MMHG | HEART RATE: 93 BPM | SYSTOLIC BLOOD PRESSURE: 151 MMHG | WEIGHT: 201 LBS | BODY MASS INDEX: 26.64 KG/M2 | HEIGHT: 73 IN

## 2023-11-29 DIAGNOSIS — E78.2 MIXED HYPERLIPIDEMIA: ICD-10-CM

## 2023-11-29 DIAGNOSIS — M20.22 HALLUX RIGIDUS OF LEFT FOOT: Primary | ICD-10-CM

## 2023-11-29 DIAGNOSIS — M20.21 HALLUX RIGIDUS OF RIGHT FOOT: ICD-10-CM

## 2023-11-29 PROCEDURE — 20600 DRAIN/INJ JOINT/BURSA W/O US: CPT | Performed by: PODIATRIST

## 2023-11-29 RX ORDER — LIDOCAINE HYDROCHLORIDE 10 MG/ML
1 INJECTION, SOLUTION INFILTRATION; PERINEURAL
Status: SHIPPED | OUTPATIENT
Start: 2023-11-29

## 2023-11-29 RX ORDER — TRIAMCINOLONE ACETONIDE 40 MG/ML
20 INJECTION, SUSPENSION INTRA-ARTICULAR; INTRAMUSCULAR
Status: SHIPPED | OUTPATIENT
Start: 2023-11-29

## 2023-11-29 RX ADMIN — LIDOCAINE HYDROCHLORIDE 1 ML: 10 INJECTION, SOLUTION INFILTRATION; PERINEURAL at 09:15

## 2023-11-29 RX ADMIN — TRIAMCINOLONE ACETONIDE 20 MG: 40 INJECTION, SUSPENSION INTRA-ARTICULAR; INTRAMUSCULAR at 09:15

## 2023-11-29 NOTE — PROGRESS NOTES
Patient again presents with pain in each great toe joint secondary to hallux rigidus. Patient has had multiple cortisone injections to relieve this discomfort. The left foot remains more painful than the right. Patient states that after injections he typically gets 3 months of relief and then lives with discomfort. Additional injection is desired today but patient also would like to schedule an implant arthroplasty for the left great toe joint during the spring months. Patient has had x-rays in the past confirming the arthritis. Range of motion first MPJ bilateral restricted to 5 degrees of dorsiflexion. Pain with palpation lateral aspect first MPJ bilateral.    Treatment: Injected lateral aspect first MPJ bilateral with 0.5 cc Kenalog 40 along with 1 cc 1% Xylocaine. Patient to be contacted by  tomorrow to schedule surgery. Small joint arthrocentesis  Universal Protocol:  Consent: Verbal consent obtained. Risks and benefits: risks, benefits and alternatives were discussed  Consent given by: patient  Patient understanding: patient states understanding of the procedure being performed  Patient identity confirmed: verbally with patient  Supporting Documentation  Indications: pain   Procedure Details  Location: great toe -   Needle size: 25 G  Approach: dorsal  Medications administered: 1 mL lidocaine 1 %; 20 mg triamcinolone acetonide 40 mg/mL        Small joint arthrocentesis: L great MTP  Universal Protocol:  Consent: Verbal consent obtained.   Risks and benefits: risks, benefits and alternatives were discussed  Consent given by: patient  Patient understanding: patient states understanding of the procedure being performed  Patient identity confirmed: verbally with patient  Supporting Documentation  Indications: pain   Procedure Details  Location: great toe - L great MTP  Needle size: 25 G  Ultrasound guidance: no  Approach: dorsal  Medications administered: 1 mL lidocaine 1 %; 20 mg triamcinolone acetonide 40 mg/mL

## 2023-11-30 RX ORDER — EZETIMIBE AND SIMVASTATIN 10; 10 MG/1; MG/1
TABLET ORAL
Qty: 90 TABLET | Refills: 0 | Status: SHIPPED | OUTPATIENT
Start: 2023-11-30

## 2023-12-05 ENCOUNTER — OFFICE VISIT (OUTPATIENT)
Dept: FAMILY MEDICINE CLINIC | Facility: CLINIC | Age: 68
End: 2023-12-05
Payer: COMMERCIAL

## 2023-12-05 VITALS
HEART RATE: 86 BPM | BODY MASS INDEX: 26.48 KG/M2 | OXYGEN SATURATION: 99 % | WEIGHT: 199.8 LBS | TEMPERATURE: 97.8 F | HEIGHT: 73 IN | DIASTOLIC BLOOD PRESSURE: 82 MMHG | SYSTOLIC BLOOD PRESSURE: 130 MMHG

## 2023-12-05 DIAGNOSIS — B37.0 THRUSH, ORAL: Primary | ICD-10-CM

## 2023-12-05 DIAGNOSIS — J01.90 ACUTE NON-RECURRENT SINUSITIS, UNSPECIFIED LOCATION: ICD-10-CM

## 2023-12-05 PROCEDURE — 99213 OFFICE O/P EST LOW 20 MIN: CPT

## 2023-12-05 RX ORDER — CLOTRIMAZOLE 10 MG/1
10 LOZENGE ORAL; TOPICAL
Qty: 70 TROCHE | Refills: 0 | Status: SHIPPED | OUTPATIENT
Start: 2023-12-05

## 2023-12-05 RX ORDER — AMOXICILLIN AND CLAVULANATE POTASSIUM 875; 125 MG/1; MG/1
1 TABLET, FILM COATED ORAL EVERY 12 HOURS SCHEDULED
Qty: 10 TABLET | Refills: 0 | Status: SHIPPED | OUTPATIENT
Start: 2023-12-05 | End: 2023-12-10

## 2023-12-05 NOTE — ASSESSMENT & PLAN NOTE
Patient reports congestion today that has been going on for over 10 days. He is tried multiple over-the-counter therapies without any improvement. Patient is going to [place] at the end of the week. Patient is mainly concerned about the ear fullness and hearing loss that he is experiencing from the congestion. At this point I recommended we treat him for a sinus infection, prescribed Augmentin for him to take for 5 days. Patient also encouraged to use Flonase to help with potential eustachian tube dysfunction. If patient is still experiencing congestion a few days before he has to fly, I stated he could use Afrin to help with the congestion. However, I gave patient strict instructions to not use Afrin for more than 3 days at a time as the nose can become addicted to it and can cause further nasal problems.

## 2023-12-05 NOTE — ASSESSMENT & PLAN NOTE
Patient presents today with signs and physical exam findings suggestive of oral thrush. Patient has been using over-the-counter washes but has not tried anything prescription yet. I sent in clotrimazole troches for him to try to see if will improve symptoms. I instructed him to take them up to 2 weeks. If the lesions do not improve at that point, we can consider using a nystatin wash. Patient is agreeable with plan set forth today and will let us know if he runs into any problems.

## 2023-12-05 NOTE — PROGRESS NOTES
Name: Caridad Yun      : 1955      MRN: [id number]  Encounter Provider: Megn Ayala PA-C  Encounter Date: 2023   Encounter department: [address] [place] [address]     1. Thrush, oral  Assessment & Plan:  Patient presents today with signs and physical exam findings suggestive of oral thrush. Patient has been using over-the-counter washes but has not tried anything prescription yet. I sent in clotrimazole troches for him to try to see if will improve symptoms. I instructed him to take them up to 2 weeks. If the lesions do not improve at that point, we can consider using a nystatin wash. Patient is agreeable with plan set forth today and will let us know if he runs into any problems. Orders:  -     clotrimazole (MYCELEX) 10 mg say; Take 1 tablet (10 mg total) by mouth 5 (five) times a day    2. Acute non-recurrent sinusitis, unspecified location  Assessment & Plan:  Patient reports congestion today that has been going on for over 10 days. He is tried multiple over-the-counter therapies without any improvement. Patient is going to [place] at the end of the week. Patient is mainly concerned about the ear fullness and hearing loss that he is experiencing from the congestion. At this point I recommended we treat him for a sinus infection, prescribed Augmentin for him to take for 5 days. Patient also encouraged to use Flonase to help with potential eustachian tube dysfunction. If patient is still experiencing congestion a few days before he has to fly, I stated he could use Afrin to help with the congestion. However, I gave patient strict instructions to not use Afrin for more than 3 days at a time as the nose can become addicted to it and can cause further nasal problems. Orders:  -     amoxicillin-clavulanate (AUGMENTIN) 875-125 mg per tablet;  Take 1 tablet by mouth every 12 (twelve) hours for 5 days        Depression Screening and Follow-up Plan: Patient was screened for depression during today's encounter. They screened negative with a PHQ-2 score of 0. Subjective      Patient presents today with concerns for possible thrush and congestion. Patient states the thrush started on Saturday and he has been using hydrogen peroxide and salt water gargles to help. He said it is getting better but is not completely gone. As for the congestion, he has had it for over 10 days and he has been treating it with over-the-counter Mucinex, DayQuil and NyQuil with minimal improvement. He endorses ear fullness, which is affecting his hearing as well. Review of Systems   Constitutional:  Negative for chills, fatigue and fever. HENT:  Positive for congestion, ear pain, hearing loss (Impaired) and sinus pressure. Negative for ear discharge, postnasal drip, sinus pain, sore throat and trouble swallowing. Oral lesions, white. Respiratory:  Negative for cough, chest tightness, shortness of breath and wheezing. Cardiovascular:  Negative for chest pain and palpitations. Gastrointestinal:  Negative for abdominal pain. Genitourinary:  Negative for difficulty urinating and dysuria. Musculoskeletal:  Negative for arthralgias and myalgias. Neurological:  Negative for dizziness, light-headedness, numbness and headaches.        Current Outpatient Medications on File Prior to Visit   Medication Sig    ezetimibe-simvastatin (VYTORIN) 10-10 mg per tablet TAKE 1 TABLET DAILY    lisinopril (ZESTRIL) 5 mg tablet TAKE 1 TABLET BY MOUTH EVERY DAY    clotrimazole-betamethasone (LOTRISONE) 1-0.05 % cream Apply topically 2 (two) times a day (Patient not taking: Reported on 1/4/2023)    hydrocortisone (ANUSOL-HC) 2.5 % rectal cream Apply topically 2 (two) times a day (Patient not taking: Reported on 6/21/2023)       Objective     /82 (BP Location: Left arm, Patient Position: Sitting, Cuff Size: Adult)   Pulse 86   Temp 97.8 °F (36.6 °C)   Ht 6' 0.5" (1.842 m)   Wt 90.6 kg (199 lb 12.8 oz)   SpO2 99%   BMI 26.73 kg/m²     Physical Exam  Vitals and nursing note reviewed. Constitutional:       General: He is not in acute distress. Appearance: Normal appearance. He is not ill-appearing. HENT:      Head: Normocephalic and atraumatic. Right Ear: Ear canal and external ear normal. Decreased hearing noted. A middle ear effusion is present. Left Ear: Ear canal and external ear normal. Decreased hearing noted. A middle ear effusion is present. Ears:      Comments: Bilateral ear effusions, no infection present. Reports bilateral decreased hearing as a result of the congestion. Nose: Congestion present. Mouth/Throat:      Lips: No lesions. Mouth: No oral lesions. Tongue: Lesions present. Palate: Lesions present. Pharynx: Oropharynx is clear. Uvula midline. No oropharyngeal exudate or posterior oropharyngeal erythema. Tonsils: No tonsillar exudate or tonsillar abscesses. Comments: Small white patches noted in areas marked read above. No lesions noted on the lower palate, on the cheeks, or on the lips. Eyes:      Pupils: Pupils are equal, round, and reactive to light. Cardiovascular:      Rate and Rhythm: Normal rate and regular rhythm. Pulses: Normal pulses. Heart sounds: Normal heart sounds. No murmur heard. Pulmonary:      Effort: Pulmonary effort is normal. No respiratory distress. Breath sounds: Normal breath sounds. No stridor. No wheezing, rhonchi or rales. Skin:     General: Skin is warm and dry. Capillary Refill: Capillary refill takes less than 2 seconds. Neurological:      General: No focal deficit present. Mental Status: He is alert and oriented to person, place, and time.    Psychiatric:         Mood and Affect: Mood normal.         Behavior: Behavior normal.         Judgment: Judgment normal.       Jorge Arias PA-C

## 2023-12-15 ENCOUNTER — TELEPHONE (OUTPATIENT)
Dept: OBGYN CLINIC | Facility: CLINIC | Age: 68
End: 2023-12-15

## 2024-01-02 ENCOUNTER — TELEPHONE (OUTPATIENT)
Age: 69
End: 2024-01-02

## 2024-01-02 NOTE — TELEPHONE ENCOUNTER
Pt has had soreness in his testicle area. The right side. He has it for two or three days.     N; [phone]

## 2024-01-15 ENCOUNTER — TELEPHONE (OUTPATIENT)
Dept: OBGYN CLINIC | Facility: CLINIC | Age: 69
End: 2024-01-15

## 2024-01-19 DIAGNOSIS — E78.2 MIXED HYPERLIPIDEMIA: ICD-10-CM

## 2024-01-19 RX ORDER — EZETIMIBE AND SIMVASTATIN 10; 10 MG/1; MG/1
TABLET ORAL
Qty: 90 TABLET | Refills: 1 | Status: SHIPPED | OUTPATIENT
Start: 2024-01-19

## 2024-02-03 PROBLEM — J01.90 ACUTE NON-RECURRENT SINUSITIS: Status: RESOLVED | Noted: 2023-12-05 | Resolved: 2024-02-03

## 2024-02-28 ENCOUNTER — APPOINTMENT (OUTPATIENT)
Dept: LAB | Facility: CLINIC | Age: 69
End: 2024-02-28
Payer: COMMERCIAL

## 2024-02-28 DIAGNOSIS — E78.5 HYPERLIPIDEMIA, UNSPECIFIED HYPERLIPIDEMIA TYPE: ICD-10-CM

## 2024-02-28 DIAGNOSIS — Z85.46 HISTORY OF PROSTATE CANCER: ICD-10-CM

## 2024-02-28 DIAGNOSIS — Z13.0 SCREENING FOR IRON DEFICIENCY ANEMIA: Primary | ICD-10-CM

## 2024-02-28 LAB
ALBUMIN SERPL BCP-MCNC: 4.9 G/DL (ref 3.5–5)
ALP SERPL-CCNC: 57 U/L (ref 34–104)
ALT SERPL W P-5'-P-CCNC: 44 U/L (ref 7–52)
ANION GAP SERPL CALCULATED.3IONS-SCNC: 11 MMOL/L
AST SERPL W P-5'-P-CCNC: 25 U/L (ref 13–39)
BASOPHILS # BLD AUTO: 0.06 THOUSANDS/ÂΜL (ref 0–0.1)
BASOPHILS NFR BLD AUTO: 1 % (ref 0–1)
BILIRUB SERPL-MCNC: 0.9 MG/DL (ref 0.2–1)
BUN SERPL-MCNC: 20 MG/DL (ref 5–25)
CALCIUM SERPL-MCNC: 9.9 MG/DL (ref 8.4–10.2)
CHLORIDE SERPL-SCNC: 100 MMOL/L (ref 96–108)
CHOLEST SERPL-MCNC: 214 MG/DL
CO2 SERPL-SCNC: 28 MMOL/L (ref 21–32)
CREAT SERPL-MCNC: 1.13 MG/DL (ref 0.6–1.3)
EOSINOPHIL # BLD AUTO: 0.13 THOUSAND/ÂΜL (ref 0–0.61)
EOSINOPHIL NFR BLD AUTO: 2 % (ref 0–6)
ERYTHROCYTE [DISTWIDTH] IN BLOOD BY AUTOMATED COUNT: 13.2 % (ref 11.6–15.1)
GFR SERPL CREATININE-BSD FRML MDRD: 67 ML/MIN/1.73SQ M
GLUCOSE P FAST SERPL-MCNC: 92 MG/DL (ref 65–99)
HCT VFR BLD AUTO: 46 % (ref 36.5–49.3)
HDLC SERPL-MCNC: 66 MG/DL
HGB BLD-MCNC: 15.3 G/DL (ref 12–17)
IMM GRANULOCYTES # BLD AUTO: 0.04 THOUSAND/UL (ref 0–0.2)
IMM GRANULOCYTES NFR BLD AUTO: 1 % (ref 0–2)
LDLC SERPL CALC-MCNC: 122 MG/DL (ref 0–100)
LYMPHOCYTES # BLD AUTO: 2.33 THOUSANDS/ÂΜL (ref 0.6–4.47)
LYMPHOCYTES NFR BLD AUTO: 32 % (ref 14–44)
MCH RBC QN AUTO: 31.5 PG (ref 26.8–34.3)
MCHC RBC AUTO-ENTMCNC: 33.3 G/DL (ref 31.4–37.4)
MCV RBC AUTO: 95 FL (ref 82–98)
MONOCYTES # BLD AUTO: 1.03 THOUSAND/ÂΜL (ref 0.17–1.22)
MONOCYTES NFR BLD AUTO: 14 % (ref 4–12)
NEUTROPHILS # BLD AUTO: 3.69 THOUSANDS/ÂΜL (ref 1.85–7.62)
NEUTS SEG NFR BLD AUTO: 50 % (ref 43–75)
NRBC BLD AUTO-RTO: 0 /100 WBCS
PLATELET # BLD AUTO: 220 THOUSANDS/UL (ref 149–390)
PMV BLD AUTO: 11.4 FL (ref 8.9–12.7)
POTASSIUM SERPL-SCNC: 3.9 MMOL/L (ref 3.5–5.3)
PROT SERPL-MCNC: 7.4 G/DL (ref 6.4–8.4)
PSA SERPL-MCNC: <0.01 NG/ML (ref 0–4)
RBC # BLD AUTO: 4.85 MILLION/UL (ref 3.88–5.62)
SODIUM SERPL-SCNC: 139 MMOL/L (ref 135–147)
TRIGL SERPL-MCNC: 129 MG/DL
TSH SERPL DL<=0.05 MIU/L-ACNC: 1.52 UIU/ML (ref 0.45–4.5)
WBC # BLD AUTO: 7.28 THOUSAND/UL (ref 4.31–10.16)

## 2024-02-28 PROCEDURE — 85025 COMPLETE CBC W/AUTO DIFF WBC: CPT

## 2024-02-28 PROCEDURE — 80061 LIPID PANEL: CPT

## 2024-02-28 PROCEDURE — 36415 COLL VENOUS BLD VENIPUNCTURE: CPT

## 2024-02-28 PROCEDURE — 84443 ASSAY THYROID STIM HORMONE: CPT

## 2024-02-28 PROCEDURE — 84153 ASSAY OF PSA TOTAL: CPT

## 2024-02-28 PROCEDURE — 80053 COMPREHEN METABOLIC PANEL: CPT

## 2024-03-20 ENCOUNTER — OFFICE VISIT (OUTPATIENT)
Dept: FAMILY MEDICINE CLINIC | Facility: CLINIC | Age: 69
End: 2024-03-20
Payer: MEDICARE

## 2024-03-20 VITALS
OXYGEN SATURATION: 99 % | WEIGHT: 199.8 LBS | HEART RATE: 84 BPM | TEMPERATURE: 99.1 F | DIASTOLIC BLOOD PRESSURE: 88 MMHG | BODY MASS INDEX: 26.48 KG/M2 | SYSTOLIC BLOOD PRESSURE: 126 MMHG | HEIGHT: 73 IN

## 2024-03-20 DIAGNOSIS — J01.90 ACUTE NON-RECURRENT SINUSITIS, UNSPECIFIED LOCATION: Primary | ICD-10-CM

## 2024-03-20 PROCEDURE — 99213 OFFICE O/P EST LOW 20 MIN: CPT | Performed by: FAMILY MEDICINE

## 2024-03-20 PROCEDURE — G2211 COMPLEX E/M VISIT ADD ON: HCPCS | Performed by: FAMILY MEDICINE

## 2024-03-20 RX ORDER — AMOXICILLIN AND CLAVULANATE POTASSIUM 875; 125 MG/1; MG/1
1 TABLET, FILM COATED ORAL EVERY 12 HOURS SCHEDULED
Qty: 14 TABLET | Refills: 0 | Status: SHIPPED | OUTPATIENT
Start: 2024-03-20 | End: 2024-03-27

## 2024-03-20 RX ORDER — BENZONATATE 100 MG/1
100 CAPSULE ORAL 3 TIMES DAILY PRN
Qty: 20 CAPSULE | Refills: 0 | Status: SHIPPED | OUTPATIENT
Start: 2024-03-20

## 2024-03-20 NOTE — ASSESSMENT & PLAN NOTE
Lungs clear on exam; abx as ordered; advised on nasal steroids, nasal saline, and antihistamines; reviewed supportive care; f/u guidance given

## 2024-03-20 NOTE — PROGRESS NOTES
"Assessment/Plan:     1. Acute non-recurrent sinusitis, unspecified location  Assessment & Plan:  Lungs clear on exam; abx as ordered; advised on nasal steroids, nasal saline, and antihistamines; reviewed supportive care; f/u guidance given     Orders:  -     amoxicillin-clavulanate (AUGMENTIN) 875-125 mg per tablet; Take 1 tablet by mouth every 12 (twelve) hours for 7 days  -     benzonatate (TESSALON PERLES) 100 mg capsule; Take 1 capsule (100 mg total) by mouth 3 (three) times a day as needed for cough          Subjective:      Patient ID: Franklyn Hogan is a 68 y.o. male.    Upper Respiratory Infection  Patient complains of possible sinusitis, possible bronchitis. Symptoms include achiness, congestion, cough described as nonproductive, low grade fever, post nasal drip, and sinus pressure. Onset of symptoms was 1 week ago, and has been gradually worsening since that time. Treatment to date: cough suppressants. Low grade fever yesterday.   Covid was negative.                     The following portions of the patient's history were reviewed and updated as appropriate: allergies, current medications, past family history, past medical history, past social history, past surgical history, and problem list.    Review of Systems   Constitutional:  Positive for chills and fever.   HENT:  Positive for congestion, postnasal drip, rhinorrhea and sinus pressure.    Respiratory:  Positive for cough. Negative for chest tightness, shortness of breath and wheezing.    Neurological:  Positive for headaches.         Objective:      /88 (BP Location: Left arm, Patient Position: Sitting, Cuff Size: Standard)   Pulse 84   Temp 99.1 °F (37.3 °C)   Ht 6' 0.5\" (1.842 m)   Wt 90.6 kg (199 lb 12.8 oz)   SpO2 99%   BMI 26.73 kg/m²          Physical Exam  Vitals reviewed.   Constitutional:       General: He is not in acute distress.     Appearance: Normal appearance. He is not ill-appearing, toxic-appearing or diaphoretic.   HENT: "      Head: Normocephalic and atraumatic.      Right Ear: Tympanic membrane normal.      Left Ear: Tympanic membrane normal.      Nose: Mucosal edema and congestion present.      Right Sinus: No maxillary sinus tenderness or frontal sinus tenderness.      Left Sinus: No maxillary sinus tenderness or frontal sinus tenderness.   Eyes:      General: No scleral icterus.        Right eye: No discharge.         Left eye: No discharge.      Conjunctiva/sclera: Conjunctivae normal.   Cardiovascular:      Rate and Rhythm: Normal rate and regular rhythm.      Pulses: Normal pulses.      Heart sounds: Normal heart sounds. No murmur heard.     No gallop.   Pulmonary:      Effort: Pulmonary effort is normal. No respiratory distress.      Breath sounds: Normal breath sounds. No stridor. No wheezing, rhonchi or rales.   Musculoskeletal:      Right lower leg: No edema.      Left lower leg: No edema.   Lymphadenopathy:      Cervical: Cervical adenopathy present.   Neurological:      General: No focal deficit present.      Mental Status: He is alert and oriented to person, place, and time.   Psychiatric:         Mood and Affect: Mood normal.         Behavior: Behavior normal.         Thought Content: Thought content normal.         Judgment: Judgment normal.

## 2024-03-22 ENCOUNTER — OFFICE VISIT (OUTPATIENT)
Dept: FAMILY MEDICINE CLINIC | Facility: CLINIC | Age: 69
End: 2024-03-22
Payer: MEDICARE

## 2024-03-22 VITALS
SYSTOLIC BLOOD PRESSURE: 130 MMHG | WEIGHT: 199 LBS | OXYGEN SATURATION: 98 % | BODY MASS INDEX: 26.37 KG/M2 | HEART RATE: 86 BPM | TEMPERATURE: 97.5 F | HEIGHT: 73 IN | DIASTOLIC BLOOD PRESSURE: 78 MMHG

## 2024-03-22 DIAGNOSIS — Z00.00 WELCOME TO MEDICARE PREVENTIVE VISIT: Primary | ICD-10-CM

## 2024-03-22 DIAGNOSIS — I10 ESSENTIAL HYPERTENSION: ICD-10-CM

## 2024-03-22 DIAGNOSIS — C61 MALIGNANT NEOPLASM PROSTATE (HCC): ICD-10-CM

## 2024-03-22 DIAGNOSIS — E78.5 HYPERLIPIDEMIA, UNSPECIFIED HYPERLIPIDEMIA TYPE: ICD-10-CM

## 2024-03-22 PROCEDURE — G0402 INITIAL PREVENTIVE EXAM: HCPCS | Performed by: FAMILY MEDICINE

## 2024-03-22 PROCEDURE — 99213 OFFICE O/P EST LOW 20 MIN: CPT | Performed by: FAMILY MEDICINE

## 2024-03-22 PROCEDURE — G0403 EKG FOR INITIAL PREVENT EXAM: HCPCS | Performed by: FAMILY MEDICINE

## 2024-03-22 NOTE — PATIENT INSTRUCTIONS
Medicare Preventive Visit Patient Instructions  Thank you for completing your Welcome to Medicare Visit or Medicare Annual Wellness Visit today. Your next wellness visit will be due in one year (3/23/2025).  The screening/preventive services that you may require over the next 5-10 years are detailed below. Some tests may not apply to you based off risk factors and/or age. Screening tests ordered at today's visit but not completed yet may show as past due. Also, please note that scanned in results may not display below.  Preventive Screenings:  Service Recommendations Previous Testing/Comments   Colorectal Cancer Screening  Colonoscopy    Fecal Occult Blood Test (FOBT)/Fecal Immunochemical Test (FIT)  Fecal DNA/Cologuard Test  Flexible Sigmoidoscopy Age: 45-75 years old   Colonoscopy: every 10 years (May be performed more frequently if at higher risk)  OR  FOBT/FIT: every 1 year  OR  Cologuard: every 3 years  OR  Sigmoidoscopy: every 5 years  Screening may be recommended earlier than age 45 if at higher risk for colorectal cancer. Also, an individualized decision between you and your healthcare provider will decide whether screening between the ages of 76-85 would be appropriate. Colonoscopy: 11/09/2020  FOBT/FIT: Not on file  Cologuard: Not on file  Sigmoidoscopy: Not on file    Screening Current     Prostate Cancer Screening Individualized decision between patient and health care provider in men between ages of 55-69   Medicare will cover every 12 months beginning on the day after your 50th birthday PSA: <0.01 ng/mL     History Prostate Cancer     Hepatitis C Screening Once for adults born between 1945 and 1965  More frequently in patients at high risk for Hepatitis C Hep C Antibody: 07/20/2021    Screening Current   Diabetes Screening 1-2 times per year if you're at risk for diabetes or have pre-diabetes Fasting glucose: 92 mg/dL (2/28/2024)  A1C: No results in last 5 years (No results in last 5 years)  Screening  Current   Cholesterol Screening Once every 5 years if you don't have a lipid disorder. May order more often based on risk factors. Lipid panel: 02/28/2024  Screening Not Indicated  History Lipid Disorder      Other Preventive Screenings Covered by Medicare:  Abdominal Aortic Aneurysm (AAA) Screening: covered once if your at risk. You're considered to be at risk if you have a family history of AAA or a male between the age of 65-75 who smoking at least 100 cigarettes in your lifetime.  Lung Cancer Screening: covers low dose CT scan once per year if you meet all of the following conditions: (1) Age 55-77; (2) No signs or symptoms of lung cancer; (3) Current smoker or have quit smoking within the last 15 years; (4) You have a tobacco smoking history of at least 20 pack years (packs per day x number of years you smoked); (5) You get a written order from a healthcare provider.  Glaucoma Screening: covered annually if you're considered high risk: (1) You have diabetes OR (2) Family history of glaucoma OR (3)  aged 50 and older OR (4)  American aged 65 and older  Osteoporosis Screening: covered every 2 years if you meet one of the following conditions: (1) Have a vertebral abnormality; (2) On glucocorticoid therapy for more than 3 months; (3) Have primary hyperparathyroidism; (4) On osteoporosis medications and need to assess response to drug therapy.  HIV Screening: covered annually if you're between the age of 15-65. Also covered annually if you are younger than 15 and older than 65 with risk factors for HIV infection. For pregnant patients, it is covered up to 3 times per pregnancy.    Immunizations:  Immunization Recommendations   Influenza Vaccine Annual influenza vaccination during flu season is recommended for all persons aged >= 6 months who do not have contraindications   Pneumococcal Vaccine   * Pneumococcal conjugate vaccine = PCV13 (Prevnar 13), PCV15 (Vaxneuvance), PCV20 (Prevnar 20)  *  Pneumococcal polysaccharide vaccine = PPSV23 (Pneumovax) Adults 19-63 yo with certain risk factors or if 65+ yo  If never received any pneumonia vaccine: recommend Prevnar 20 (PCV20)  Give PCV20 if previously received 1 dose of PCV13 or PPSV23   Hepatitis B Vaccine 3 dose series if at intermediate or high risk (ex: diabetes, end stage renal disease, liver disease)   Respiratory syncytial virus (RSV) Vaccine - COVERED BY MEDICARE PART D  * RSVPreF3 (Arexvy) CDC recommends that adults 60 years of age and older may receive a single dose of RSV vaccine using shared clinical decision-making (SCDM)   Tetanus (Td) Vaccine - COST NOT COVERED BY MEDICARE PART B Following completion of primary series, a booster dose should be given every 10 years to maintain immunity against tetanus. Td may also be given as tetanus wound prophylaxis.   Tdap Vaccine - COST NOT COVERED BY MEDICARE PART B Recommended at least once for all adults. For pregnant patients, recommended with each pregnancy.   Shingles Vaccine (Shingrix) - COST NOT COVERED BY MEDICARE PART B  2 shot series recommended in those 19 years and older who have or will have weakened immune systems or those 50 years and older     Health Maintenance Due:      Topic Date Due   • Colorectal Cancer Screening  11/09/2030   • Hepatitis C Screening  Completed     Immunizations Due:      Topic Date Due   • COVID-19 Vaccine (7 - 2023-24 season) 09/01/2023     Advance Directives   What are advance directives?  Advance directives are legal documents that state your wishes and plans for medical care. These plans are made ahead of time in case you lose your ability to make decisions for yourself. Advance directives can apply to any medical decision, such as the treatments you want, and if you want to donate organs.   What are the types of advance directives?  There are many types of advance directives, and each state has rules about how to use them. You may choose a combination of any of  the following:  Living will:  This is a written record of the treatment you want. You can also choose which treatments you do not want, which to limit, and which to stop at a certain time. This includes surgery, medicine, IV fluid, and tube feedings.   Durable power of  for healthcare (DPAHC):  This is a written record that states who you want to make healthcare choices for you when you are unable to make them for yourself. This person, called a proxy, is usually a family member or a friend. You may choose more than 1 proxy.  Do not resuscitate (DNR) order:  A DNR order is used in case your heart stops beating or you stop breathing. It is a request not to have certain forms of treatment, such as CPR. A DNR order may be included in other types of advance directives.  Medical directive:  This covers the care that you want if you are in a coma, near death, or unable to make decisions for yourself. You can list the treatments you want for each condition. Treatment may include pain medicine, surgery, blood transfusions, dialysis, IV or tube feedings, and a ventilator (breathing machine).  Values history:  This document has questions about your views, beliefs, and how you feel and think about life. This information can help others choose the care that you would choose.  Why are advance directives important?  An advance directive helps you control your care. Although spoken wishes may be used, it is better to have your wishes written down. Spoken wishes can be misunderstood, or not followed. Treatments may be given even if you do not want them. An advance directive may make it easier for your family to make difficult choices about your care.   Weight Management   Why it is important to manage your weight:  Being overweight increases your risk of health conditions such as heart disease, high blood pressure, type 2 diabetes, and certain types of cancer. It can also increase your risk for osteoarthritis, sleep apnea,  and other respiratory problems. Aim for a slow, steady weight loss. Even a small amount of weight loss can lower your risk of health problems.  How to lose weight safely:  A safe and healthy way to lose weight is to eat fewer calories and get regular exercise. You can lose up about 1 pound a week by decreasing the number of calories you eat by 500 calories each day.   Healthy meal plan for weight management:  A healthy meal plan includes a variety of foods, contains fewer calories, and helps you stay healthy. A healthy meal plan includes the following:  Eat whole-grain foods more often.  A healthy meal plan should contain fiber. Fiber is the part of grains, fruits, and vegetables that is not broken down by your body. Whole-grain foods are healthy and provide extra fiber in your diet. Some examples of whole-grain foods are whole-wheat breads and pastas, oatmeal, brown rice, and bulgur.  Eat a variety of vegetables every day.  Include dark, leafy greens such as spinach, kale, james greens, and mustard greens. Eat yellow and orange vegetables such as carrots, sweet potatoes, and winter squash.   Eat a variety of fruits every day.  Choose fresh or canned fruit (canned in its own juice or light syrup) instead of juice. Fruit juice has very little or no fiber.  Eat low-fat dairy foods.  Drink fat-free (skim) milk or 1% milk. Eat fat-free yogurt and low-fat cottage cheese. Try low-fat cheeses such as mozzarella and other reduced-fat cheeses.  Choose meat and other protein foods that are low in fat.  Choose beans or other legumes such as split peas or lentils. Choose fish, skinless poultry (chicken or turkey), or lean cuts of red meat (beef or pork). Before you cook meat or poultry, cut off any visible fat.   Use less fat and oil.  Try baking foods instead of frying them. Add less fat, such as margarine, sour cream, regular salad dressing and mayonnaise to foods. Eat fewer high-fat foods. Some examples of high-fat foods  "include french fries, doughnuts, ice cream, and cakes.  Eat fewer sweets.  Limit foods and drinks that are high in sugar. This includes candy, cookies, regular soda, and sweetened drinks.  Exercise:  Exercise at least 30 minutes per day on most days of the week. Some examples of exercise include walking, biking, dancing, and swimming. You can also fit in more physical activity by taking the stairs instead of the elevator or parking farther away from stores. Ask your healthcare provider about the best exercise plan for you.   Alcohol Use and Your Health    Drinking too much can harm your health.  Excessive alcohol use leads to about 88,000 death in the [place] each year, and shortens the life of those who diet by almost 30 years.  Further, excessive drinking cost the economy $249 billion in 2010.  Most excessive drinkers are not alcohol dependent.    Excessive alcohol use has immediate effects that increase the risk of many harmful health conditions.  These are most often the result of binge drinking.  Over time, excessive alcohol use can lead to the development of chronic diseases and other series health problems.    What is considered a \"drink\"?        Excessive alcohol use includes:  Binge Drinking: For women, 4 or more drinks consumed on one occasion. For men, 5 or more drinks consumed on one occasion.  Heavy Drinking: For women, 8 or more drinks per week. For men, 15 or more drinks per week  Any alcohol used by pregnant women  Any alcohol used by those under the age of 21 years    If you choose to drink, do so in moderation:  Do not drink at all if you are under the age of 21, or if you are or may be pregnant, or have health problems that could be made worse by drinking.  For women, up to 1 drink per day  For men, up to 2 drinks a day    No one should begin drinking or drink more frequently based on potential health benefits    Short-Term Health Risks:  Injuries: motor vehicle crashes, falls, drownings, " burns  Violence: homicide, suicide, sexual assault, intimate partner violence  Alcohol poisoning  Reproductive health: risky sexual behaviors, unintended prengnacy, sexually transmitted diseases, miscarriage, stillbirth, fetal alcohol syndrome    Long-Term Health Risks:  Chronic diseases: high blood pressure, heart disease, stroke, liver disease, digestive problems  Cancers: breast, mouth and throat, liver, colon  Learning and memory problems: dementia, poor school performance  Mental health: depression, anxiety, insomnia  Social problems: lost productivity, family problems, unemployment  Alcohol dependence    For support and more information:  Substance Abuse and Mental Health Services Administration  [address]  [place] [address]  Web Address: http://www.sama.gov    Alcoholics Anonymous        Web Address: http://www.aa.org    https://www.cdc.gov/alcohol/fact-sheets/alcohol-use.htm     © Copyright [employer] 2018 Information is for End User's use only and may not be sold, redistributed or otherwise used for commercial purposes. All illustrations and images included in CareNotes® are the copyrighted property of [employer] or "[employer] ([employer])"

## 2024-03-22 NOTE — PROGRESS NOTES
Assessment and Plan:     Problem List Items Addressed This Visit     Welcome to Medicare preventive visit - Primary     Questionnaire reviewed.  Immunization health screening history updated.  Advance directive in place.         Relevant Orders    POCT ECG (Completed)    Malignant neoplasm prostate (HCC)    Hyperlipidemia     Lipids stable with a total cholesterol of 214 though his LDL is less than 130 and his HDL is greater than 60.  Continue Vytorin 10/10         Relevant Orders    Comprehensive metabolic panel    TSH, 3rd generation with Free T4 reflex    Essential hypertension     Well-controlled on lisinopril 5 mg daily            Depression Screening and Follow-up Plan: Patient was screened for depression during today's encounter. They screened negative with a PHQ-2 score of 0.      Preventive health issues were discussed with patient, and age appropriate screening tests were ordered as noted in patient's After Visit Summary.  Personalized health advice and appropriate referrals for health education or preventive services given if needed, as noted in patient's After Visit Summary.     History of Present Illness:     Patient presents for a Medicare Wellness Visit    Patient presents for welcome to Medicare physical.  Overall doing well.  He retired 2 months ago and feels well.  He has remained very active with exercise regularly skiing in the wintertime etc.  His chronic medical problems include hyperlipidemia and hypertension.  He is compliant with his medications.       Patient Care Team:  Dylan Wilkins DO as PCP - General (Family Medicine)  MD Joseph Huang MD (Radiation Oncology)  Brian Duque MD (Urology)  Jorge Luis Sanchez MD as Endoscopist     Review of Systems:     Review of Systems   Constitutional: Negative.    Respiratory: Negative.     Cardiovascular: Negative.    Gastrointestinal: Negative.    Genitourinary: Negative.    Musculoskeletal: Negative.   "  Psychiatric/Behavioral: Negative.          Problem List:     Patient Active Problem List   Diagnosis   • Arthritis of knee, right   • Erectile dysfunction of non-organic origin   • Hyperlipidemia   • IBS (irritable bowel syndrome)   • Vitamin D deficiency   • Malignant neoplasm prostate (HCC)   • Essential hypertension   • Bilirubin in urine   • Hematuria   • Thrush, oral   • Acute non-recurrent sinusitis   • Welcome to Medicare preventive visit      Past Medical and Surgical History:     Past Medical History:   Diagnosis Date   • Abnormal cardiovascular stress test     last assessed-11/13/2015   • Abnormal electrocardiogram     last assessed-11/2/2015   • Acute nonintractable headache 07/21/2021   • Arthritis    • 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   • Cancer (HCC)     prostate   • COVID-19 03/23/2021   • Elevated PSA    • Encounter for screening colonoscopy for non-high-risk patient     family hx possible of colon ca   • Environmental and seasonal allergies    • Hallux rigidus     last assessed-4/24/2014   • History of prostate cancer 12/03/2020   • Hyperlipidemia    • Hypertension    • Ingrown toenail    • Irregular heart beat     \"rapid heart beat episode X1\" - tested - no issue   • Organic impotence     last assessed-4/24/2014   • Otitis media 12/13/2019   • Rapid pulse     resolved-1/19/2016   • Tachycardia     last assessed-11/2/2015   • Wears glasses      Past Surgical History:   Procedure Laterality Date   • APPENDECTOMY     • BACK SURGERY      L4-L5   • COLONOSCOPY      X 3-4    • HERNIA REPAIR     • OTHER SURGICAL HISTORY      spinal diskectomy   • UT COLONOSCOPY FLX DX W/COLLJ SPEC WHEN PFRMD N/A 9/30/2016    Procedure: COLONOSCOPY;  Surgeon: Jorge Luis Sanchez MD;  Location: [facility];  Service: Colorectal   • UT LAPAROSCOPIC APPENDECTOMY N/A 12/21/2019    Procedure: APPENDECTOMY LAPAROSCOPIC;  Surgeon: Brian Abbott MD;  Location[facility];  Service: General   • UT LAPS SURG " [id number] RPBIC RAD W/NRV SPARING ROBOT N/A 10/29/2018    Procedure: PROSTATECTOMY RADICAL W ROBOTICS; BILATERAL PELVIC LYMPH NODE DISSECTION; LYSIS OF ADHESIONS;  Surgeon: Matthew Garcia MD;  [facility];  Service: Urology   • DE RPR 1ST INGUN HRNA AGE 5 YRS/> REDUCIBLE Left 1/10/2020    Procedure: LAPAROSCOPIC INGUINAL HERNIA REPAIR WITH MESH;  Surgeon: Brian Abbott MD;  Location[facility];  Service: General   • PROSTATE BIOPSY Bilateral 2018   • REDUCTION OF TORSION OF TESTIS     • VASECTOMY     • WISDOM TOOTH EXTRACTION        Family History:     Family History   Problem Relation Age of Onset   • Cancer Mother    • Hepatitis Father         Hepatitis C   • Dementia Father    • Asthma Father       Social History:     Social History     Socioeconomic History   • Marital status: /Civil Union     Spouse name: None   • Number of children: None   • Years of education: None   • Highest education level: None   Occupational History   • Occupation: Sales   Tobacco Use   • Smoking status: Former     Current packs/day: 0.00     Average packs/day: 0.5 packs/day for 9.0 years (4.5 ttl pk-yrs)     Types: Cigarettes     Start date: 1976     Quit date:      Years since quittin.9   • Smokeless tobacco: Never   Vaping Use   • Vaping status: Never Used   Substance and Sexual Activity   • Alcohol use: Yes     Alcohol/week: 20.0 standard drinks of alcohol     Types: 20 Standard drinks or equivalent per week     Comment: occasion   • Drug use: No   • Sexual activity: Not Currently     Partners: Female     Birth control/protection: Male Sterilization   Other Topics Concern   • None   Social History Narrative    Consumes 3 cups of 1/2 caf of coffee per day     Social Determinants of Health     Financial Resource Strain: Not on file   Food Insecurity: No Food Insecurity (3/22/2024)    Hunger Vital Sign    • Worried About Running Out of Food in the Last Year: Never true    • Ran Out of Food in the  Last Year: Never true   Transportation Needs: No Transportation Needs (3/22/2024)    PRAPARE - Transportation    • Lack of Transportation (Medical): No    • Lack of Transportation (Non-Medical): No   Physical Activity: Not on file   Stress: Not on file   Social Connections: Not on file   Intimate Partner Violence: Not on file   Housing Stability: Low Risk  (3/22/2024)    Housing Stability Vital Sign    • Unable to Pay for Housing in the Last Year: No    • Number of Places Lived in the Last Year: 1    • Unstable Housing in the Last Year: No      Medications and Allergies:     Current Outpatient Medications   Medication Sig Dispense Refill   • amoxicillin-clavulanate (AUGMENTIN) 875-125 mg per tablet Take 1 tablet by mouth every 12 (twelve) hours for 7 days 14 tablet 0   • benzonatate (TESSALON PERLES) 100 mg capsule Take 1 capsule (100 mg total) by mouth 3 (three) times a day as needed for cough 20 capsule 0   • ezetimibe-simvastatin (VYTORIN) 10-10 mg per tablet TAKE 1 TABLET DAILY 90 tablet 1   • lisinopril (ZESTRIL) 5 mg tablet TAKE 1 TABLET BY MOUTH EVERY DAY 90 tablet 1   • clotrimazole (MYCELEX) 10 mg aneesh Take 1 tablet (10 mg total) by mouth 5 (five) times a day 70 Aneesh 0   • clotrimazole-betamethasone (LOTRISONE) 1-0.05 % cream Apply topically 2 (two) times a day (Patient not taking: Reported on 3/20/2024) 30 g 0   • hydrocortisone (ANUSOL-HC) 2.5 % rectal cream Apply topically 2 (two) times a day (Patient not taking: Reported on 6/21/2023) 28 g 1     Current Facility-Administered Medications   Medication Dose Route Frequency Provider Last Rate Last Admin   • lidocaine (XYLOCAINE) 1 % injection 1 mL  1 mL Injection  Randall Blanchard DPM   1 mL at 11/29/23 0915   • lidocaine (XYLOCAINE) 1 % injection 1 mL  1 mL Injection  Randall Blanchard DPM   1 mL at 11/29/23 0915   • triamcinolone acetonide (KENALOG-40) 40 mg/mL injection 20 mg  20 mg Intra-articular  Randall Blanchard DPM   20 mg at 11/29/23 0915   •  triamcinolone acetonide (KENALOG-40) 40 mg/mL injection 20 mg  20 mg Infiltration  Randall Blanchard, DPM   20 mg at 11/29/23 0915     Allergies   Allergen Reactions   • Lactose - Food Allergy GI Intolerance      Immunizations:     Immunization History   Administered Date(s) Administered   • COVID-19 PFIZER VACCINE 0.3 ML IM 06/23/2021, 07/14/2021, 01/06/2022, 04/26/2022, 06/05/2023   • COVID-19 Pfizer Vac BIVALENT Jamar-sucrose 12 Yr+ IM 11/11/2022   • H1N1, All Formulations 12/28/2009   • INFLUENZA 10/03/2016, 10/03/2022   • Influenza Quadrivalent, 6-35 Months IM 10/03/2016, 11/06/2017   • Influenza, high dose seasonal 0.7 mL 09/25/2023   • Influenza, recombinant, quadrivalent,injectable, preservative free 10/29/2018, 11/11/2019, 09/16/2020, 10/26/2021   • Influenza, seasonal, injectable 01/01/2011, 11/08/2012, 11/25/2014, 12/04/2015   • Pneumococcal Conjugate Vaccine 20-valent (Pcv20), Polysace 10/25/2022   • Tdap 10/25/2022   • Zoster 11/06/2017   • Zoster Vaccine Recombinant 09/21/2020, 12/21/2020      Health Maintenance:         Topic Date Due   • Colorectal Cancer Screening  11/09/2030   • Hepatitis C Screening  Completed         Topic Date Due   • COVID-19 Vaccine (7 - 2023-24 season) 09/01/2023      Medicare Screening Tests and Risk Assessments:     Franklyn is here for his Welcome to Medicare visit.     Health Risk Assessment:   Patient rates overall health as very good. Patient feels that their physical health rating is same. Patient is very satisfied with their life. Eyesight was rated as same. Hearing was rated as same. Patient feels that their emotional and mental health rating is same. Patients states they are never, rarely angry. Patient states they are never, rarely unusually tired/fatigued. Pain experienced in the last 7 days has been none. Patient states that he has experienced no weight loss or gain in last 6 months.     Depression Screening:   PHQ-2 Score: 0      Fall Risk Screening:   In the past  year, patient has experienced: no history of falling in past year      Home Safety:  Patient does not have trouble with stairs inside or outside of their home. Patient has working smoke alarms and has working carbon monoxide detector. Home safety hazards include: none.     Nutrition:   Current diet is Regular, Low Saturated Fat and No Added Salt.     Medications:   Patient is currently taking over-the-counter supplements. OTC medications include: see medication list. Patient is able to manage medications.     Activities of Daily Living (ADLs)/Instrumental Activities of Daily Living (IADLs):   Walk and transfer into and out of bed and chair?: Yes  Dress and groom yourself?: Yes    Bathe or shower yourself?: Yes    Feed yourself? Yes  Do your laundry/housekeeping?: Yes  Manage your money, pay your bills and track your expenses?: Yes  Make your own meals?: Yes    Do your own shopping?: Yes    Previous Hospitalizations:   Any hospitalizations or ED visits within the last 12 months?: No      Advance Care Planning:   Living will: No    Durable POA for healthcare: Yes    Advanced directive: Yes      Cognitive Screening:   Provider or family/friend/caregiver concerned regarding cognition?: No    PREVENTIVE SCREENINGS      Cardiovascular Screening:    General: Screening Not Indicated and History Lipid Disorder      Diabetes Screening:     General: Screening Current      Colorectal Cancer Screening:     General: Screening Current      Prostate Cancer Screening:    General: History Prostate Cancer      Osteoporosis Screening:    General: Screening Not Indicated      Abdominal Aortic Aneurysm (AAA) Screening:    Risk factors include: age between 65-76 yo and tobacco use        General: Screening Not Indicated      Lung Cancer Screening:     General: Screening Not Indicated      Hepatitis C Screening:    General: Screening Current      Preventive Screening Comments: Normal aorta on CT scan 2019    Screening, Brief Intervention,  and Referral to Treatment (SBIRT)    Screening  Typical number of drinks in a day: 3  Typical number of drinks in a week: 20  Interpretation: Low risk drinking behavior.    AUDIT-C Screenin) How often did you have a drink containing alcohol in the past year? 4 or more times a week  2) How many drinks did you have on a typical day when you were drinking in the past year? 3 to 4  3) How often did you have 6 or more drinks on one occasion in the past year? never    AUDIT-C Score: 4  Interpretation: Score 4-12 (male): POSITIVE screen for alcohol misuse    AUDIT Screenin) How often during the last year have you found that you were not able to stop drinking once you had started? 0 - never  5) How often during the last year have you failed to do what was normally expected from you because of drinking? 0 - never  6) How often during the last year have you needed a first drink in the morning to get yourself going after a heavy drinking session? 0 - never  7) How often during the last year have you had a feeling of guilt or remorse after drinking? 0 - never  8) How often during the last year have you been unable to remember what happened the night before because you had been drinking? 0 - never  9) Have you or someone else been injured as a result of your drinking? 0 - no  10) Has a relative or friend or a doctor or another health worker been concerned about your drinking or suggested you cut down? 0 - no    AUDIT Score: 4  Interpretation: Low risk alcohol consumption    Single Item Drug Screening:  How often have you used an illegal drug (including marijuana) or a prescription medication for non-medical reasons in the past year? never    Single Item Drug Screen Score: 0  Interpretation: Negative screen for possible drug use disorder    Brief Intervention  Alcohol & drug use screenings were reviewed. No concerns regarding substance use disorder identified.     Vision Screening    Right eye Left eye Both eyes  "  Without correction      With correction 20/13 20/13 20/13        Physical Exam:     /78 (BP Location: Left arm, Patient Position: Sitting, Cuff Size: Standard)   Pulse 86   Temp 97.5 °F (36.4 °C) (Temporal)   Ht 6' 0.5\" (1.842 m)   Wt 90.3 kg (199 lb)   SpO2 98%   BMI 26.62 kg/m²     Physical Exam  Vitals and nursing note reviewed.   Constitutional:       Appearance: Normal appearance. He is well-developed.   HENT:      Head: Normocephalic.      Right Ear: External ear normal.      Left Ear: External ear normal.      Nose: Nose normal.      Mouth/Throat:      Mouth: Mucous membranes are moist.   Eyes:      Conjunctiva/sclera: Conjunctivae normal.      Pupils: Pupils are equal, round, and reactive to light.   Cardiovascular:      Rate and Rhythm: Normal rate and regular rhythm.      Heart sounds: Normal heart sounds.   Pulmonary:      Effort: Pulmonary effort is normal.      Breath sounds: Normal breath sounds.   Musculoskeletal:         General: Normal range of motion.      Cervical back: Normal range of motion and neck supple.   Skin:     General: Skin is warm and dry.   Neurological:      Mental Status: He is alert.   Psychiatric:         Mood and Affect: Mood normal.         Behavior: Behavior normal.         Thought Content: Thought content normal.         Judgment: Judgment normal.          Dylan Wilkins, DO  "

## 2024-03-22 NOTE — ASSESSMENT & PLAN NOTE
Questionnaire reviewed.  Immunization health screening history updated.  Advance directive in place.

## 2024-03-22 NOTE — ASSESSMENT & PLAN NOTE
Lipids stable with a total cholesterol of 214 though his LDL is less than 130 and his HDL is greater than 60.  Continue Vytorin 10/10

## 2024-05-01 PROBLEM — Z00.00 WELCOME TO MEDICARE PREVENTIVE VISIT: Status: RESOLVED | Noted: 2024-03-22 | Resolved: 2024-05-01

## 2024-05-01 PROBLEM — J01.90 ACUTE NON-RECURRENT SINUSITIS: Status: RESOLVED | Noted: 2023-12-05 | Resolved: 2024-05-01

## 2024-05-15 ENCOUNTER — OFFICE VISIT (OUTPATIENT)
Dept: PODIATRY | Facility: CLINIC | Age: 69
End: 2024-05-15
Payer: MEDICARE

## 2024-05-15 VITALS — SYSTOLIC BLOOD PRESSURE: 129 MMHG | HEART RATE: 107 BPM | DIASTOLIC BLOOD PRESSURE: 77 MMHG

## 2024-05-15 DIAGNOSIS — M20.22 HALLUX RIGIDUS OF LEFT FOOT: Primary | ICD-10-CM

## 2024-05-15 DIAGNOSIS — B35.3 TINEA PEDIS OF BOTH FEET: ICD-10-CM

## 2024-05-15 DIAGNOSIS — I10 ESSENTIAL HYPERTENSION: ICD-10-CM

## 2024-05-15 DIAGNOSIS — M20.21 HALLUX RIGIDUS OF RIGHT FOOT: ICD-10-CM

## 2024-05-15 PROCEDURE — 20600 DRAIN/INJ JOINT/BURSA W/O US: CPT | Performed by: PODIATRIST

## 2024-05-15 RX ORDER — TRIAMCINOLONE ACETONIDE 40 MG/ML
20 INJECTION, SUSPENSION INTRA-ARTICULAR; INTRAMUSCULAR
Status: SHIPPED | OUTPATIENT
Start: 2024-05-15

## 2024-05-15 RX ORDER — CLOTRIMAZOLE AND BETAMETHASONE DIPROPIONATE 10; .64 MG/G; MG/G
CREAM TOPICAL 2 TIMES DAILY
Qty: 15 G | Refills: 1 | Status: SHIPPED | OUTPATIENT
Start: 2024-05-15

## 2024-05-15 RX ORDER — LIDOCAINE HYDROCHLORIDE 10 MG/ML
1 INJECTION, SOLUTION INFILTRATION; PERINEURAL
Status: SHIPPED | OUTPATIENT
Start: 2024-05-15

## 2024-05-15 RX ORDER — LISINOPRIL 5 MG/1
TABLET ORAL
Qty: 90 TABLET | Refills: 1 | Status: SHIPPED | OUTPATIENT
Start: 2024-05-15

## 2024-05-15 RX ADMIN — TRIAMCINOLONE ACETONIDE 20 MG: 40 INJECTION, SUSPENSION INTRA-ARTICULAR; INTRAMUSCULAR at 14:00

## 2024-05-15 RX ADMIN — LIDOCAINE HYDROCHLORIDE 1 ML: 10 INJECTION, SOLUTION INFILTRATION; PERINEURAL at 14:00

## 2024-05-15 NOTE — PROGRESS NOTES
Patient presents with bilateral foot pain secondary to hallux rigidus.  Patient has typically had pain in the left foot more severe than right but today the right is more painful than the left.  He is also interested in Lotrisone cream for his chronic tinea pedis.    On exam, pain with palpation lateral aspect first MPJ bilateral.  Range of motion first MPJ bilateral restricted to 5 degrees of dorsiflexion and it is painful.  Patient again interested in surgery and we will discuss which were to operate on at next visit.    Treatment consisted of injecting the lateral aspect first MPJ bilateral with 0.5 cc Kenalog 40 along with 1 cc 1% Xylocaine.  Reappoint 6 weeks.  Also prescribed Lotrisone cream for the chronic tinea.    Small joint arthrocentesis: R great MTP  Universal Protocol:  Consent: Verbal consent obtained.  Risks and benefits: risks, benefits and alternatives were discussed  Consent given by: patient  Patient understanding: patient states understanding of the procedure being performed  Patient identity confirmed: verbally with patient  Supporting Documentation  Indications: pain   Procedure Details  Location: great toe - R great MTP  Needle size: 25 G  Approach: dorsal  Medications administered: 1 mL lidocaine 1 %; 20 mg triamcinolone acetonide 40 mg/mL        Small joint arthrocentesis: L great MTP  Universal Protocol:  Consent: Verbal consent obtained.  Risks and benefits: risks, benefits and alternatives were discussed  Consent given by: patient  Patient understanding: patient states understanding of the procedure being performed  Patient identity confirmed: verbally with patient  Supporting Documentation  Indications: pain   Procedure Details  Location: great toe - L great MTP  Needle size: 25 G  Approach: dorsal  Medications administered: 1 mL lidocaine 1 %; 20 mg triamcinolone acetonide 40 mg/mL            19

## 2024-06-03 ENCOUNTER — OFFICE VISIT (OUTPATIENT)
Dept: UROLOGY | Facility: MEDICAL CENTER | Age: 69
End: 2024-06-03
Payer: MEDICARE

## 2024-06-03 VITALS
HEIGHT: 73 IN | DIASTOLIC BLOOD PRESSURE: 80 MMHG | SYSTOLIC BLOOD PRESSURE: 130 MMHG | HEART RATE: 73 BPM | BODY MASS INDEX: 25.71 KG/M2 | OXYGEN SATURATION: 100 % | RESPIRATION RATE: 16 BRPM | WEIGHT: 194 LBS

## 2024-06-03 DIAGNOSIS — Z85.46 HISTORY OF PROSTATE CANCER: Primary | ICD-10-CM

## 2024-06-03 PROCEDURE — 99213 OFFICE O/P EST LOW 20 MIN: CPT | Performed by: UROLOGY

## 2024-06-03 PROCEDURE — 81003 URINALYSIS AUTO W/O SCOPE: CPT | Performed by: UROLOGY

## 2024-06-03 NOTE — PROGRESS NOTES
"   HISTORY:    History of prostate cancer, radical prostatectomy in 2018.    PSA at the 0 level ever since.    Good urinary stream and control.  Occasional postvoid dribbling but not bothered         ASSESSMENT / PLAN:    Doing well.  I reviewed the pathology from his radical prostatectomy, negative margins, negative lymph nodes.  He is certainly near 100% expected cure rate    At this point only needs his yearly PSA.    He will get the PSA at family doctor.  If any new issues, or any rise in PSA, we will see him back right away.    The following portions of the patient's history were reviewed and updated as appropriate: allergies, current medications, past family history, past medical history, past social history, past surgical history, and problem list.    Review of Systems      Objective:     Physical Exam  Genitourinary:     Comments: Penis testes normal          0   Lab Value Date/Time    PSA <0.01 02/28/2024 1033    PSA <0.1 07/09/2022 1124    PSA <0.1 12/02/2020 1137    PSA <0.1 04/30/2020 0909    PSA <0.1 11/01/2019 0800    PSA <0.1 04/20/2019 1039   ]  BUN   Date Value Ref Range Status   02/28/2024 20 5 - 25 mg/dL Final   09/26/2023 22 7 - 25 mg/dL Final     Creatinine   Date Value Ref Range Status   02/28/2024 1.13 0.60 - 1.30 mg/dL Final     Comment:     Standardized to IDMS reference method   10/24/2017 1.05 0.70 - 1.25 mg/dL Final     Comment:     Result Comment: For patients >49 years of age, the reference limit  for Creatinine is approximately 13% higher for people  identified as -American.       No components found for: \"CBC\"      Patient Active Problem List   Diagnosis    Arthritis of knee, right    Erectile dysfunction of non-organic origin    Hyperlipidemia    IBS (irritable bowel syndrome)    Vitamin D deficiency    Malignant neoplasm prostate (HCC)    Essential hypertension    Bilirubin in urine    Hematuria    Thrush, oral        Diagnoses and all orders for this visit:    History of " prostate cancer  -     POCT urine dip auto non-scope           Patient ID: Franklyn Hogan is a 68 y.o. male.      Current Outpatient Medications:     benzonatate (TESSALON PERLES) 100 mg capsule, Take 1 capsule (100 mg total) by mouth 3 (three) times a day as needed for cough, Disp: 20 capsule, Rfl: 0    clotrimazole (MYCELEX) 10 mg aneesh, Take 1 tablet (10 mg total) by mouth 5 (five) times a day, Disp: 70 Aneesh, Rfl: 0    clotrimazole-betamethasone (LOTRISONE) 1-0.05 % cream, Apply topically 2 (two) times a day (Patient not taking: Reported on 3/20/2024), Disp: 30 g, Rfl: 0    clotrimazole-betamethasone (LOTRISONE) 1-0.05 % cream, Apply topically 2 (two) times a day, Disp: 15 g, Rfl: 1    ezetimibe-simvastatin (VYTORIN) 10-10 mg per tablet, TAKE 1 TABLET DAILY, Disp: 90 tablet, Rfl: 1    hydrocortisone (ANUSOL-HC) 2.5 % rectal cream, Apply topically 2 (two) times a day (Patient not taking: Reported on 6/21/2023), Disp: 28 g, Rfl: 1    lisinopril (ZESTRIL) 5 mg tablet, TAKE 1 TABLET BY MOUTH EVERY DAY, Disp: 90 tablet, Rfl: 1    Current Facility-Administered Medications:     lidocaine (XYLOCAINE) 1 % injection 1 mL, 1 mL, Injection, , Randall Blanchard DPM, 1 mL at 11/29/23 0915    lidocaine (XYLOCAINE) 1 % injection 1 mL, 1 mL, Injection, , Randall Blanchard DPM, 1 mL at 11/29/23 0915    lidocaine (XYLOCAINE) 1 % injection 1 mL, 1 mL, Injection, , , 1 mL at 05/15/24 1400    lidocaine (XYLOCAINE) 1 % injection 1 mL, 1 mL, Injection, , , 1 mL at 05/15/24 1400    triamcinolone acetonide (KENALOG-40) 40 mg/mL injection 20 mg, 20 mg, Intra-articular, , Randall Blanchard DPM, 20 mg at 11/29/23 0915    triamcinolone acetonide (KENALOG-40) 40 mg/mL injection 20 mg, 20 mg, Infiltration, , Randall Blanchard DPM, 20 mg at 11/29/23 0915    triamcinolone acetonide (KENALOG-40) 40 mg/mL injection 20 mg, 20 mg, Infiltration, , , 20 mg at 05/15/24 1400    triamcinolone acetonide (KENALOG-40) 40 mg/mL injection 20 mg, 20 mg, Infiltration,  ", , 20 mg at 05/15/24 1400    Past Medical History:   Diagnosis Date    Abnormal cardiovascular stress test     last assessed-11/13/2015    Abnormal electrocardiogram     last assessed-11/2/2015    Acute nonintractable headache 07/21/2021    Arthritis     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    Cancer (HCC)     prostate    COVID-19 03/23/2021    Elevated PSA     Encounter for screening colonoscopy for non-high-risk patient     family hx possible of colon ca    Environmental and seasonal allergies     Hallux rigidus     last assessed-4/24/2014    History of prostate cancer 12/03/2020    Hyperlipidemia     Hypertension     Ingrown toenail     Irregular heart beat     \"rapid heart beat episode X1\" - tested - no issue    Organic impotence     last assessed-4/24/2014    Otitis media 12/13/2019    Rapid pulse     resolved-1/19/2016    Tachycardia     last assessed-11/2/2015    Wears glasses        Past Surgical History:   Procedure Laterality Date    APPENDECTOMY      BACK SURGERY      L4-L5    COLONOSCOPY      X 3-4     HERNIA REPAIR      OTHER SURGICAL HISTORY      spinal diskectomy    FL COLONOSCOPY FLX DX W/COLLJ SPEC WHEN PFRMD N/A 9/30/2016    Procedure: COLONOSCOPY;  Surgeon: Jorge Luis Sanchez MD;  Location: [facility];  Service: Colorectal    FL LAPAROSCOPIC APPENDECTOMY N/A 12/21/2019    Procedure: APPENDECTOMY LAPAROSCOPIC;  Surgeon: Brian Abbott MD;  [facility];  Service: General    FL LAPS SURG [id number] RPBIC RAD W/NRV SPARING ROBOT N/A 10/29/2018    Procedure: PROSTATECTOMY RADICAL W ROBOTICS; BILATERAL PELVIC LYMPH NODE DISSECTION; LYSIS OF ADHESIONS;  Surgeon: Matthew Garcia MD;  [facility];  Service: Urology    FL RPR 1ST INGUN HRNA AGE 5 YRS/> REDUCIBLE Left 1/10/2020    Procedure: LAPAROSCOPIC INGUINAL HERNIA REPAIR WITH MESH;  Surgeon: Brian Abbott MD;  [facility];  Service: General    PROSTATE BIOPSY Bilateral 08/2018    REDUCTION OF TORSION OF " TESTIS  2001    VASECTOMY      WISDOM TOOTH EXTRACTION         Social History

## 2024-06-26 ENCOUNTER — OFFICE VISIT (OUTPATIENT)
Dept: PODIATRY | Facility: CLINIC | Age: 69
End: 2024-06-26
Payer: MEDICARE

## 2024-06-26 VITALS
WEIGHT: 194 LBS | HEIGHT: 73 IN | BODY MASS INDEX: 25.71 KG/M2 | SYSTOLIC BLOOD PRESSURE: 144 MMHG | DIASTOLIC BLOOD PRESSURE: 83 MMHG | HEART RATE: 76 BPM

## 2024-06-26 DIAGNOSIS — M20.22 HALLUX RIGIDUS OF LEFT FOOT: Primary | ICD-10-CM

## 2024-06-26 PROCEDURE — 99213 OFFICE O/P EST LOW 20 MIN: CPT | Performed by: PODIATRIST

## 2024-06-26 NOTE — PROGRESS NOTES
Patient presents with his wife to discuss implant arthroplasty.  Patient had cortisone injections in each great toe joint along the lateral aspect of the joint at his last visit 6 weeks ago.  His feet remain comfortable from these injections.  Typically he gets relief for approximately 3 or 4 months with these injections.    Patient desires implant arthroplasty first MPJ dressing the left foot in October 2024.  At the same time  that surgery is being performed, cortisone injection desired in the right foot.    On exam, minimal range of motion in dorsiflexion first MPJ bilateral.  There is crepitus present and pain with attempts at this motion.  Dorsal exostosis present first metatarsal head bilateral.    Discussed in detail pre and postoperative course of implant arthroplasty.  We will first address the left great toe joint.  The surgery is to be scheduled on October 11 at [facility].  [facility]  He will still be in need of a surgical soap.

## 2024-07-09 DIAGNOSIS — Z01.818 PRE-OP EVALUATION: Primary | ICD-10-CM

## 2024-08-07 DIAGNOSIS — E78.2 MIXED HYPERLIPIDEMIA: ICD-10-CM

## 2024-08-07 RX ORDER — EZETIMIBE AND SIMVASTATIN 10; 10 MG/1; MG/1
1 TABLET ORAL DAILY
Qty: 100 TABLET | Refills: 1 | Status: SHIPPED | OUTPATIENT
Start: 2024-08-07

## 2024-08-07 NOTE — TELEPHONE ENCOUNTER
Reason for call:   [x] Refill   [] Prior Auth  [] Other:     Office:   [x] PCP/Provider - Dylan Wilkins, DO  [] Specialty/Provider -     Medication: ezetimibe-simvastatin (VYTORIN) 10-10 mg per tablet     Dose/Frequency: TAKE 1 TABLET DAILY,     Quantity: 100    Pharmacy: [facility]/pharmacy #[id number] - [place], [place] - [address] [place] [address]      Does the patient have enough for 3 days?   [x] Yes   [] No - Send as HP to POD

## 2024-08-21 ENCOUNTER — TELEPHONE (OUTPATIENT)
Dept: FAMILY MEDICINE CLINIC | Facility: CLINIC | Age: 69
End: 2024-08-21

## 2024-09-09 ENCOUNTER — OFFICE VISIT (OUTPATIENT)
Dept: FAMILY MEDICINE CLINIC | Facility: CLINIC | Age: 69
End: 2024-09-09
Payer: MEDICARE

## 2024-09-09 VITALS
WEIGHT: 188 LBS | TEMPERATURE: 97.7 F | HEART RATE: 88 BPM | BODY MASS INDEX: 27.85 KG/M2 | OXYGEN SATURATION: 98 % | DIASTOLIC BLOOD PRESSURE: 78 MMHG | SYSTOLIC BLOOD PRESSURE: 128 MMHG | HEIGHT: 69 IN

## 2024-09-09 DIAGNOSIS — R10.30 LOWER ABDOMINAL PAIN: Primary | ICD-10-CM

## 2024-09-09 DIAGNOSIS — C61 MALIGNANT NEOPLASM PROSTATE (HCC): ICD-10-CM

## 2024-09-09 PROCEDURE — 99213 OFFICE O/P EST LOW 20 MIN: CPT | Performed by: FAMILY MEDICINE

## 2024-09-09 PROCEDURE — G2211 COMPLEX E/M VISIT ADD ON: HCPCS | Performed by: FAMILY MEDICINE

## 2024-09-09 NOTE — PROGRESS NOTES
Ambulatory Visit  Name: Franklyn Hogan      : 1955      MRN: [id number]  Encounter Provider: Hamzah Sanford DO  Encounter Date: 2024   Encounter department: [facility]    Assessment & Plan   1. Lower abdominal pain  -     CT abdomen pelvis w contrast; Future; Expected date: 2024  -     Basic metabolic panel; Future  2. Malignant neoplasm prostate (HCC)  -     CT abdomen pelvis w contrast; Future; Expected date: 2024     Patient presents with 1 week history of lower abdominal pain.  He states pain is worse with changes in position or walking.  Appetite is normal.  No nausea or vomiting.  No changes in bowels.  No bloody stools.  No urinary complaints.  Patient does have history of prostate cancer.  He is status post robotic prostatectomy approximately 6 years ago.  Also 6 years ago, he had left inguinal hernia repair and appendectomy (all 3 surgeries were performed within a 2-month period).  On examination, patient has lower abdominal tenderness.  Bowel sounds are normal.  Equivocal rebound present.  Will send for CT abdomen and pelvis with contrast (will also check BMP prior).  ER precautions given.        History of Present Illness     Patient presents with 1 week history of lower abdominal pain.  He states pain is worse with changes in position or walking.  Appetite is normal.  No nausea or vomiting.  No changes in bowels.  No bloody stools.  No urinary complaints.  Patient does have history of prostate cancer.  He is status post robotic prostatectomy approximately 6 years ago.  Also 6 years ago, he had left inguinal hernia repair and appendectomy (all 3 surgeries were performed within a 2-month period)    Abdominal Pain      Review of Systems   Respiratory: Negative.     Cardiovascular: Negative.    Gastrointestinal:  Positive for abdominal pain.   Genitourinary: Negative.      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   • Cancer (HCC)     prostate   • COVID-19 03/23/2021   • Elevated PSA    • Encounter for screening colonoscopy for non-high-risk patient     family hx possible of colon ca   • Environmental and seasonal allergies    • Hallux rigidus     last assessed-4/24/2014   • History of prostate cancer 12/03/2020   • Hyperlipidemia    • Hypertension    • Ingrown toenail    • Irregular heart beat     \"rapid heart beat episode X1\" - tested - no issue   • Organic impotence     last assessed-4/24/2014   • Otitis media 12/13/2019   • Rapid pulse     resolved-1/19/2016   • Tachycardia     last assessed-11/2/2015   • Wears glasses      Past Surgical History:   Procedure Laterality Date   • APPENDECTOMY     • BACK SURGERY      L4-L5   • COLONOSCOPY      X 3-4    • HERNIA REPAIR     • OTHER SURGICAL HISTORY      spinal diskectomy   • UT COLONOSCOPY FLX DX W/COLLJ SPEC WHEN PFRMD N/A 9/30/2016    Procedure: COLONOSCOPY;  Surgeon: Jorge Luis Sanchez MD;  [facility];  Service: Colorectal   • UT LAPAROSCOPIC APPENDECTOMY N/A 12/21/2019    Procedure: APPENDECTOMY LAPAROSCOPIC;  Surgeon: Brian Abbott MD;  [facility];  Service: General   • UT LAPS SURG [id number] RPBIC RAD W/NRV SPARING ROBOT N/A 10/29/2018    Procedure: PROSTATECTOMY RADICAL W ROBOTICS; BILATERAL PELVIC LYMPH NODE DISSECTION; LYSIS OF ADHESIONS;  Surgeon: Matthew Garcia MD;  [facility];  Service: Urology   • UT RPR 1ST INGUN HRNA AGE 5 YRS/> REDUCIBLE Left 1/10/2020    Procedure: LAPAROSCOPIC INGUINAL HERNIA REPAIR WITH MESH;  Surgeon: Brian Abbott MD;  [facility];  Service: General   • PROSTATE BIOPSY Bilateral 08/2018   • REDUCTION OF TORSION OF TESTIS  2001   • VASECTOMY     • WISDOM TOOTH EXTRACTION       Family History   Problem " Relation Age of Onset   • Cancer Mother    • Hepatitis Father         Hepatitis C   • Dementia Father    • Asthma Father      Social History     Tobacco Use   • Smoking status: Former     Current packs/day: 0.00     Average packs/day: 0.5 packs/day for 9.0 years (4.5 ttl pk-yrs)     Types: Cigarettes     Start date: 1976     Quit date:      Years since quittin.4   • Smokeless tobacco: Never   Vaping Use   • Vaping status: Never Used   Substance and Sexual Activity   • Alcohol use: Yes     Alcohol/week: 20.0 standard drinks of alcohol     Types: 20 Standard drinks or equivalent per week     Comment: occasion   • Drug use: No   • Sexual activity: Not Currently     Partners: Female     Birth control/protection: Male Sterilization     Current Outpatient Medications on File Prior to Visit   Medication Sig   • ezetimibe-simvastatin (VYTORIN) 10-10 mg per tablet Take 1 tablet by mouth daily   • lisinopril (ZESTRIL) 5 mg tablet TAKE 1 TABLET BY MOUTH EVERY DAY   • clotrimazole-betamethasone (LOTRISONE) 1-0.05 % cream Apply topically 2 (two) times a day (Patient not taking: Reported on 2024)   • hydrocortisone (ANUSOL-HC) 2.5 % rectal cream Apply topically 2 (two) times a day (Patient not taking: Reported on 2023)     Allergies   Allergen Reactions   • Lactose - Food Allergy GI Intolerance     Immunization History   Administered Date(s) Administered   • COVID-19 PFIZER VACCINE 0.3 ML IM 2021, 2021, 2022, 2022, 2023   • COVID-19 Pfizer Vac BIVALENT Jamar-sucrose 12 Yr+ IM 2022   • H1N1, All Formulations 2009   • INFLUENZA 10/03/2016, 10/03/2022   • Influenza Quadrivalent, 6-35 Months IM 10/03/2016, 2017   • Influenza, high dose seasonal 0.7 mL 2023   • Influenza, recombinant, quadrivalent,injectable, preservative free 10/29/2018, 2019, 2020, 10/26/2021   • Influenza, seasonal, injectable 2011, 2012, 2014, 2015   •  "Pneumococcal Conjugate Vaccine 20-valent (Pcv20), Polysace 10/25/2022   • Tdap 10/25/2022   • Zoster 11/06/2017   • Zoster Vaccine Recombinant 09/21/2020, 12/21/2020     Objective     /78 (BP Location: Left arm, Patient Position: Sitting, Cuff Size: Adult)   Pulse 88   Temp 97.7 °F (36.5 °C)   Ht 5' 9\" (1.753 m)   Wt 85.3 kg (188 lb)   SpO2 98%   BMI 27.76 kg/m²     Physical Exam    "

## 2024-09-10 ENCOUNTER — APPOINTMENT (OUTPATIENT)
Dept: LAB | Facility: CLINIC | Age: 69
End: 2024-09-10
Payer: MEDICARE

## 2024-09-10 DIAGNOSIS — R10.30 LOWER ABDOMINAL PAIN: ICD-10-CM

## 2024-09-10 DIAGNOSIS — E78.5 HYPERLIPOPROTEINEMIA: Primary | ICD-10-CM

## 2024-09-10 LAB
ALBUMIN SERPL BCG-MCNC: 4.2 G/DL (ref 3.5–5)
ALP SERPL-CCNC: 52 U/L (ref 34–104)
ALT SERPL W P-5'-P-CCNC: 30 U/L (ref 7–52)
ANION GAP SERPL CALCULATED.3IONS-SCNC: 6 MMOL/L (ref 4–13)
ANION GAP SERPL CALCULATED.3IONS-SCNC: 7 MMOL/L (ref 4–13)
AST SERPL W P-5'-P-CCNC: 21 U/L (ref 13–39)
BASOPHILS # BLD AUTO: 0.04 THOUSANDS/ÂΜL (ref 0–0.1)
BASOPHILS NFR BLD AUTO: 1 % (ref 0–1)
BILIRUB SERPL-MCNC: 0.56 MG/DL (ref 0.2–1)
BUN SERPL-MCNC: 18 MG/DL (ref 5–25)
BUN SERPL-MCNC: 18 MG/DL (ref 5–25)
CALCIUM SERPL-MCNC: 9 MG/DL (ref 8.4–10.2)
CALCIUM SERPL-MCNC: 9.2 MG/DL (ref 8.4–10.2)
CHLORIDE SERPL-SCNC: 100 MMOL/L (ref 96–108)
CHLORIDE SERPL-SCNC: 101 MMOL/L (ref 96–108)
CO2 SERPL-SCNC: 29 MMOL/L (ref 21–32)
CO2 SERPL-SCNC: 30 MMOL/L (ref 21–32)
CREAT SERPL-MCNC: 0.99 MG/DL (ref 0.6–1.3)
CREAT SERPL-MCNC: 1.04 MG/DL (ref 0.6–1.3)
EOSINOPHIL # BLD AUTO: 0.31 THOUSAND/ÂΜL (ref 0–0.61)
EOSINOPHIL NFR BLD AUTO: 5 % (ref 0–6)
ERYTHROCYTE [DISTWIDTH] IN BLOOD BY AUTOMATED COUNT: 12.6 % (ref 11.6–15.1)
GFR SERPL CREATININE-BSD FRML MDRD: 73 ML/MIN/1.73SQ M
GFR SERPL CREATININE-BSD FRML MDRD: 78 ML/MIN/1.73SQ M
GLUCOSE P FAST SERPL-MCNC: 96 MG/DL (ref 65–99)
GLUCOSE P FAST SERPL-MCNC: 96 MG/DL (ref 65–99)
HCT VFR BLD AUTO: 46.1 % (ref 36.5–49.3)
HGB BLD-MCNC: 15 G/DL (ref 12–17)
IMM GRANULOCYTES # BLD AUTO: 0.03 THOUSAND/UL (ref 0–0.2)
IMM GRANULOCYTES NFR BLD AUTO: 1 % (ref 0–2)
LYMPHOCYTES # BLD AUTO: 2.5 THOUSANDS/ÂΜL (ref 0.6–4.47)
LYMPHOCYTES NFR BLD AUTO: 39 % (ref 14–44)
MCH RBC QN AUTO: 31.7 PG (ref 26.8–34.3)
MCHC RBC AUTO-ENTMCNC: 32.5 G/DL (ref 31.4–37.4)
MCV RBC AUTO: 98 FL (ref 82–98)
MONOCYTES # BLD AUTO: 1.02 THOUSAND/ÂΜL (ref 0.17–1.22)
MONOCYTES NFR BLD AUTO: 16 % (ref 4–12)
NEUTROPHILS # BLD AUTO: 2.37 THOUSANDS/ÂΜL (ref 1.85–7.62)
NEUTS SEG NFR BLD AUTO: 38 % (ref 43–75)
NRBC BLD AUTO-RTO: 0 /100 WBCS
PLATELET # BLD AUTO: 194 THOUSANDS/UL (ref 149–390)
PMV BLD AUTO: 11.2 FL (ref 8.9–12.7)
POTASSIUM SERPL-SCNC: 4.5 MMOL/L (ref 3.5–5.3)
POTASSIUM SERPL-SCNC: 4.9 MMOL/L (ref 3.5–5.3)
PROT SERPL-MCNC: 6.5 G/DL (ref 6.4–8.4)
RBC # BLD AUTO: 4.73 MILLION/UL (ref 3.88–5.62)
SODIUM SERPL-SCNC: 136 MMOL/L (ref 135–147)
SODIUM SERPL-SCNC: 137 MMOL/L (ref 135–147)
TSH SERPL DL<=0.05 MIU/L-ACNC: 1.77 UIU/ML (ref 0.45–4.5)
WBC # BLD AUTO: 6.27 THOUSAND/UL (ref 4.31–10.16)

## 2024-09-10 PROCEDURE — 80053 COMPREHEN METABOLIC PANEL: CPT

## 2024-09-10 PROCEDURE — 36415 COLL VENOUS BLD VENIPUNCTURE: CPT

## 2024-09-10 PROCEDURE — 84443 ASSAY THYROID STIM HORMONE: CPT

## 2024-09-10 PROCEDURE — 80048 BASIC METABOLIC PNL TOTAL CA: CPT

## 2024-09-10 PROCEDURE — 85025 COMPLETE CBC W/AUTO DIFF WBC: CPT

## 2024-09-16 ENCOUNTER — CONSULT (OUTPATIENT)
Dept: FAMILY MEDICINE CLINIC | Facility: CLINIC | Age: 69
End: 2024-09-16
Payer: MEDICARE

## 2024-09-16 VITALS
OXYGEN SATURATION: 99 % | BODY MASS INDEX: 29.41 KG/M2 | HEIGHT: 69 IN | DIASTOLIC BLOOD PRESSURE: 80 MMHG | SYSTOLIC BLOOD PRESSURE: 127 MMHG | WEIGHT: 198.6 LBS | HEART RATE: 90 BPM

## 2024-09-16 DIAGNOSIS — E78.2 MIXED HYPERLIPIDEMIA: ICD-10-CM

## 2024-09-16 DIAGNOSIS — C61 MALIGNANT NEOPLASM PROSTATE (HCC): ICD-10-CM

## 2024-09-16 DIAGNOSIS — I10 ESSENTIAL HYPERTENSION: ICD-10-CM

## 2024-09-16 DIAGNOSIS — M20.22 HALLUX RIGIDUS OF LEFT FOOT: ICD-10-CM

## 2024-09-16 DIAGNOSIS — Z01.818 PRE-OP EXAMINATION: Primary | ICD-10-CM

## 2024-09-16 PROCEDURE — 99213 OFFICE O/P EST LOW 20 MIN: CPT | Performed by: FAMILY MEDICINE

## 2024-09-16 NOTE — PROGRESS NOTES
Pre-operative Clearance  Name: Franklyn Hogan      : 1955      MRN: [id number]  Encounter Provider: Dylan Wilkins DO  Encounter Date: 2024   Encounter department: [facility]    Assessment & Plan  Pre-op examination  Patient seen for preop clearance for upcoming foot surgery.  Recent lab work reviewed.  ECG done today is unremarkable.  Patient is clinically stable.  He is low risk for the proposed surgery and he is medically cleared to proceed.       Mixed hyperlipidemia    Orders:  •  Comprehensive metabolic panel; Future  •  Lipid Panel with Direct LDL reflex; Future  •  TSH, 3rd generation; Future    Malignant neoplasm prostate (HCC)    Orders:  •  PSA, total and free; Future    Hallux rigidus of left foot         Essential hypertension         Pre-operative Clearance:     Clearance:  Patient is medically optimized (CLEARED) for proposed surgery without any additional cardiac testing.      Medication Instructions:   - Avoid herbs or non-directed vitamins one week prior to surgery    - Avoid aspirin containing medications or non-steroidal anti-inflammatory drugs one week preceding surgery    - May take tylenol for pain up until the night before surgery    - ACE Inhibitors or ARBs: Continue this medication up to the evening before surgery/procedure, but do not take the morning of the day of surgery.  - Corticosteroids: Continue to take this medication on your normal schedule.  - Hyperlipidemia meds: Continue to take this medication on your normal schedule.       History of Present Illness     Franklyn Hogan  69 y.o.  male    SURGEON:Lo    SURGERY/PROCEDURE: Cheilectomy with implant left foot    DATE OF SURGERY: 10/11    PRIOR ANESTHESIA:yes    COMPLICATION: no    BLEEDING PROBLEM: no    PERTINENT PMH: Prostate cancer, irritable bowel syndrome, hyperlipidemia, hypertension    EXERCISE CAPACITY:   CAN WALK 4 BLOCKS AND OR CLIMB 2 FLIGHTS: Yes    HOME LIVING SITUATION SAFE  "AND SECURE: Yes      TOBACCO: no     ETOH: yes     ILLEGAL DRUGS: no    Patient was seen for preop clearance for upcoming surgery.  He will be having colectomy with an implant of his left foot.  Chronic medical problems listed above.  Medications include Vytorin 10/10 and lisinopril 5 mg daily.  He has no other concerns today.    Review of Systems   Constitutional: Negative.    Respiratory: Negative.     Cardiovascular: Negative.    Gastrointestinal: Negative.    Genitourinary: Negative.    Musculoskeletal:  Positive for arthralgias (Left great toe).   Psychiatric/Behavioral: Negative.       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   • Cancer (HCC)     prostate   • COVID-19 03/23/2021   • Elevated PSA    • Encounter for screening colonoscopy for non-high-risk patient     family hx possible of colon ca   • Environmental and seasonal allergies    • Hallux rigidus     last assessed-4/24/2014   • History of prostate cancer 12/03/2020   • Hyperlipidemia    • Hypertension    • Ingrown toenail    • Irregular heart beat     \"rapid heart beat episode X1\" - tested - no issue   • Organic impotence     last assessed-4/24/2014   • Otitis media 12/13/2019   • Rapid pulse     resolved-1/19/2016   • Tachycardia     last assessed-11/2/2015   • Wears glasses      Past Surgical History:   Procedure Laterality Date   • APPENDECTOMY     • BACK SURGERY      L4-L5   • COLONOSCOPY      X 3-4    • HERNIA REPAIR     • OTHER SURGICAL HISTORY      spinal diskectomy   • [place] COLONOSCOPY FLX DX W/COLLJ SPEC WHEN PFRMD N/A 9/30/2016    Procedure: COLONOSCOPY;  Surgeon: Jorge Luis Sanchez MD;  [facility];  Service: Colorectal   • MO LAPAROSCOPIC APPENDECTOMY N/A 12/21/2019    " Procedure: APPENDECTOMY LAPAROSCOPIC;  Surgeon: Brian Abbott MD;  [facility];  Service: General   • MS LAPS SURG [id number] RPBIC RAD W/NRV SPARING ROBOT N/A 10/29/2018    Procedure: PROSTATECTOMY RADICAL W ROBOTICS; BILATERAL PELVIC LYMPH NODE DISSECTION; LYSIS OF ADHESIONS;  Surgeon: Matthew Garcia MD;  [facility];  Service: Urology   • MS RPR 1ST INGUN HRNA AGE 5 YRS/> REDUCIBLE Left 1/10/2020    Procedure: LAPAROSCOPIC INGUINAL HERNIA REPAIR WITH MESH;  Surgeon: Brian Abbott MD;  [facility];  Service: General   • PROSTATE BIOPSY Bilateral 2018   • REDUCTION OF TORSION OF TESTIS     • VASECTOMY     • WISDOM TOOTH EXTRACTION       Family History   Problem Relation Age of Onset   • Cancer Mother    • Hepatitis Father         Hepatitis C   • Dementia Father    • Asthma Father      Social History     Tobacco Use   • Smoking status: Former     Current packs/day: 0.00     Average packs/day: 0.5 packs/day for 9.0 years (4.5 ttl pk-yrs)     Types: Cigarettes     Start date: 1976     Quit date:      Years since quittin.4   • Smokeless tobacco: Never   Vaping Use   • Vaping status: Never Used   Substance and Sexual Activity   • Alcohol use: Yes     Alcohol/week: 20.0 standard drinks of alcohol     Types: 20 Standard drinks or equivalent per week     Comment: occasion   • Drug use: No   • Sexual activity: Not Currently     Partners: Female     Birth control/protection: Male Sterilization     Current Outpatient Medications on File Prior to Visit   Medication Sig   • ezetimibe-simvastatin (VYTORIN) 10-10 mg per tablet Take 1 tablet by mouth daily   • lisinopril (ZESTRIL) 5 mg tablet TAKE 1 TABLET BY MOUTH EVERY DAY   • clotrimazole-betamethasone (LOTRISONE) 1-0.05 % cream Apply topically 2 (two) times a day (Patient not taking: Reported on 2024)   • hydrocortisone (ANUSOL-HC) 2.5 % rectal cream Apply topically 2 (two) times a day (Patient not taking: Reported on  "6/21/2023)     Allergies   Allergen Reactions   • Lactose - Food Allergy GI Intolerance     Objective     /80 (BP Location: Left arm, Patient Position: Sitting, Cuff Size: Adult)   Pulse 90   Ht 5' 9\" (1.753 m)   Wt 90.1 kg (198 lb 9.6 oz)   SpO2 99%   BMI 29.33 kg/m²     Physical Exam  Vitals and nursing note reviewed.   Constitutional:       General: He is not in acute distress.     Appearance: Normal appearance.   HENT:      Head: Normocephalic and atraumatic.   Eyes:      Pupils: Pupils are equal, round, and reactive to light.   Cardiovascular:      Rate and Rhythm: Normal rate and regular rhythm.      Pulses: Normal pulses.      Heart sounds: Normal heart sounds.   Pulmonary:      Effort: Pulmonary effort is normal.      Breath sounds: Normal breath sounds.   Musculoskeletal:         General: Normal range of motion.      Cervical back: Normal range of motion.   Skin:     General: Skin is warm and dry.   Neurological:      General: No focal deficit present.      Mental Status: He is alert and oriented to person, place, and time. Mental status is at baseline.   Psychiatric:         Mood and Affect: Mood normal.         Behavior: Behavior normal.         Thought Content: Thought content normal.         Judgment: Judgment normal.         Dylan Wilkins, DO  "

## 2024-09-16 NOTE — H&P (VIEW-ONLY)
Pre-operative Clearance  Name: Franklyn Hogan      : 1955      MRN: [id number]  Encounter Provider: Dylan Wilkins DO  Encounter Date: 2024   Encounter department: [facility]    Assessment & Plan  Pre-op examination  Patient seen for preop clearance for upcoming foot surgery.  Recent lab work reviewed.  ECG done today is unremarkable.  Patient is clinically stable.  He is low risk for the proposed surgery and he is medically cleared to proceed.       Mixed hyperlipidemia    Orders:  •  Comprehensive metabolic panel; Future  •  Lipid Panel with Direct LDL reflex; Future  •  TSH, 3rd generation; Future    Malignant neoplasm prostate (HCC)    Orders:  •  PSA, total and free; Future    Hallux rigidus of left foot         Essential hypertension         Pre-operative Clearance:     Clearance:  Patient is medically optimized (CLEARED) for proposed surgery without any additional cardiac testing.      Medication Instructions:   - Avoid herbs or non-directed vitamins one week prior to surgery    - Avoid aspirin containing medications or non-steroidal anti-inflammatory drugs one week preceding surgery    - May take tylenol for pain up until the night before surgery    - ACE Inhibitors or ARBs: Continue this medication up to the evening before surgery/procedure, but do not take the morning of the day of surgery.  - Corticosteroids: Continue to take this medication on your normal schedule.  - Hyperlipidemia meds: Continue to take this medication on your normal schedule.       History of Present Illness     Franklyn Hogan  69 y.o.  male    SURGEON:Lo    SURGERY/PROCEDURE: Cheilectomy with implant left foot    DATE OF SURGERY: 10/11    PRIOR ANESTHESIA:yes    COMPLICATION: no    BLEEDING PROBLEM: no    PERTINENT PMH: Prostate cancer, irritable bowel syndrome, hyperlipidemia, hypertension    EXERCISE CAPACITY:   CAN WALK 4 BLOCKS AND OR CLIMB 2 FLIGHTS: Yes    HOME LIVING SITUATION SAFE  "AND SECURE: Yes      TOBACCO: no     ETOH: yes     ILLEGAL DRUGS: no    Patient was seen for preop clearance for upcoming surgery.  He will be having colectomy with an implant of his left foot.  Chronic medical problems listed above.  Medications include Vytorin 10/10 and lisinopril 5 mg daily.  He has no other concerns today.    Review of Systems   Constitutional: Negative.    Respiratory: Negative.     Cardiovascular: Negative.    Gastrointestinal: Negative.    Genitourinary: Negative.    Musculoskeletal:  Positive for arthralgias (Left great toe).   Psychiatric/Behavioral: Negative.       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   • Cancer (HCC)     prostate   • COVID-19 03/23/2021   • Elevated PSA    • Encounter for screening colonoscopy for non-high-risk patient     family hx possible of colon ca   • Environmental and seasonal allergies    • Hallux rigidus     last assessed-4/24/2014   • History of prostate cancer 12/03/2020   • Hyperlipidemia    • Hypertension    • Ingrown toenail    • Irregular heart beat     \"rapid heart beat episode X1\" - tested - no issue   • Organic impotence     last assessed-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/2014   • Otitis media 12/13/2019   • Rapid pulse     resolved-1/19/2016   • Tachycardia     last assessed-11/2/2015   • Wears glasses      Past Surgical History:   Procedure Laterality Date   • APPENDECTOMY     • BACK SURGERY      L4-L5   • COLONOSCOPY      X 3-4    • HERNIA REPAIR     • OTHER SURGICAL HISTORY      spinal diskectomy   • [place] COLONOSCOPY FLX DX W/COLLJ SPEC WHEN PFRMD N/A 9/30/2016    Procedure: COLONOSCOPY;  Surgeon: Jorge Luis Sanchez MD;  [facility];  Service: Colorectal   • WI LAPAROSCOPIC APPENDECTOMY N/A 12/21/2019    " Procedure: APPENDECTOMY LAPAROSCOPIC;  Surgeon: Brian Abbott MD;  [facility];  Service: General   • CT LAPS SURG [id number] RPBIC RAD W/NRV SPARING ROBOT N/A 10/29/2018    Procedure: PROSTATECTOMY RADICAL W ROBOTICS; BILATERAL PELVIC LYMPH NODE DISSECTION; LYSIS OF ADHESIONS;  Surgeon: Matthew Garcia MD;  [facility];  Service: Urology   • CT RPR 1ST INGUN HRNA AGE 5 YRS/> REDUCIBLE Left 1/10/2020    Procedure: LAPAROSCOPIC INGUINAL HERNIA REPAIR WITH MESH;  Surgeon: Brian Abbott MD;  [facility];  Service: General   • PROSTATE BIOPSY Bilateral 2018   • REDUCTION OF TORSION OF TESTIS     • VASECTOMY     • WISDOM TOOTH EXTRACTION       Family History   Problem Relation Age of Onset   • Cancer Mother    • Hepatitis Father         Hepatitis C   • Dementia Father    • Asthma Father      Social History     Tobacco Use   • Smoking status: Former     Current packs/day: 0.00     Average packs/day: 0.5 packs/day for 9.0 years (4.5 ttl pk-yrs)     Types: Cigarettes     Start date: 1976     Quit date:      Years since quittin.4   • Smokeless tobacco: Never   Vaping Use   • Vaping status: Never Used   Substance and Sexual Activity   • Alcohol use: Yes     Alcohol/week: 20.0 standard drinks of alcohol     Types: 20 Standard drinks or equivalent per week     Comment: occasion   • Drug use: No   • Sexual activity: Not Currently     Partners: Female     Birth control/protection: Male Sterilization     Current Outpatient Medications on File Prior to Visit   Medication Sig   • ezetimibe-simvastatin (VYTORIN) 10-10 mg per tablet Take 1 tablet by mouth daily   • lisinopril (ZESTRIL) 5 mg tablet TAKE 1 TABLET BY MOUTH EVERY DAY   • clotrimazole-betamethasone (LOTRISONE) 1-0.05 % cream Apply topically 2 (two) times a day (Patient not taking: Reported on 2024)   • hydrocortisone (ANUSOL-HC) 2.5 % rectal cream Apply topically 2 (two) times a day (Patient not taking: Reported on  "6/21/2023)     Allergies   Allergen Reactions   • Lactose - Food Allergy GI Intolerance     Objective     /80 (BP Location: Left arm, Patient Position: Sitting, Cuff Size: Adult)   Pulse 90   Ht 5' 9\" (1.753 m)   Wt 90.1 kg (198 lb 9.6 oz)   SpO2 99%   BMI 29.33 kg/m²     Physical Exam  Vitals and nursing note reviewed.   Constitutional:       General: He is not in acute distress.     Appearance: Normal appearance.   HENT:      Head: Normocephalic and atraumatic.   Eyes:      Pupils: Pupils are equal, round, and reactive to light.   Cardiovascular:      Rate and Rhythm: Normal rate and regular rhythm.      Pulses: Normal pulses.      Heart sounds: Normal heart sounds.   Pulmonary:      Effort: Pulmonary effort is normal.      Breath sounds: Normal breath sounds.   Musculoskeletal:         General: Normal range of motion.      Cervical back: Normal range of motion.   Skin:     General: Skin is warm and dry.   Neurological:      General: No focal deficit present.      Mental Status: He is alert and oriented to person, place, and time. Mental status is at baseline.   Psychiatric:         Mood and Affect: Mood normal.         Behavior: Behavior normal.         Thought Content: Thought content normal.         Judgment: Judgment normal.         Dylan Wilkins, DO  "

## 2024-09-16 NOTE — ASSESSMENT & PLAN NOTE
Orders:    Comprehensive metabolic panel; Future    Lipid Panel with Direct LDL reflex; Future    TSH, 3rd generation; Future

## 2024-09-17 ENCOUNTER — HOSPITAL ENCOUNTER (OUTPATIENT)
Dept: CT IMAGING | Facility: HOSPITAL | Age: 69
Discharge: HOME | End: 2024-09-17
Payer: MEDICARE

## 2024-09-17 ENCOUNTER — TELEPHONE (OUTPATIENT)
Dept: FAMILY MEDICINE CLINIC | Facility: CLINIC | Age: 69
End: 2024-09-17

## 2024-09-17 DIAGNOSIS — R10.30 LOWER ABDOMINAL PAIN: ICD-10-CM

## 2024-09-17 DIAGNOSIS — C61 MALIGNANT NEOPLASM PROSTATE (HCC): ICD-10-CM

## 2024-09-17 PROCEDURE — 74177 CT ABD & PELVIS W/CONTRAST: CPT

## 2024-09-17 PROCEDURE — 93000 ELECTROCARDIOGRAM COMPLETE: CPT | Performed by: FAMILY MEDICINE

## 2024-09-17 RX ADMIN — IOHEXOL 100 ML: 350 INJECTION, SOLUTION INTRAVENOUS at 19:46

## 2024-10-09 ENCOUNTER — ANESTHESIA EVENT (OUTPATIENT)
Dept: PERIOP | Facility: HOSPITAL | Age: 69
End: 2024-10-09
Payer: MEDICARE

## 2024-10-09 RX ORDER — MULTIVITAMIN
1 TABLET ORAL DAILY
COMMUNITY

## 2024-10-09 NOTE — PRE-PROCEDURE INSTRUCTIONS
Pre-Surgery Instructions:   Medication Instructions    Cholecalciferol (VITAMIN D3) 1,000 units tablet Stop taking 3 days prior to surgery.    ezetimibe-simvastatin (VYTORIN) 10-10 mg per tablet Take day of surgery.    lisinopril (ZESTRIL) 5 mg tablet Hold day of surgery.    Multiple Vitamin (multivitamin) tablet Stop taking 3 days prior to surgery.    Medication instructions for day surgery reviewed. Please use only a sip of water to take your instructed medications. Avoid all over the counter vitamins, supplements and NSAIDS for one week prior to surgery per anesthesia guidelines. Tylenol is ok to take as needed.     You will receive a call one business day prior to surgery with an arrival time and hospital directions. If your surgery is scheduled on a Monday, the hospital will be calling you on the Friday prior to your surgery. If you have not heard from anyone by 8pm, please call the hospital supervisor through the hospital  at [phone]. ([place] [phone] or [place] [phone]).    Do not eat or drink anything after midnight the night before your surgery, including candy, mints, lifesavers, or chewing gum. Do not drink alcohol 24hrs before your surgery. Try not to smoke at least 24hrs before your surgery.       Follow the pre surgery showering instructions as listed in the “My Surgical Experience Booklet” or otherwise provided by your surgeon's office. Do not use a blade to shave the surgical area 1 week before surgery. It is okay to use a clean electric clippers up to 24 hours before surgery. Do not apply any lotions, creams, including makeup, cologne, deodorant, or perfumes after showering on the day of your surgery. Do not use dry shampoo, hair spray, hair gel, or any type of hair products.     No contact lenses, eye make-up, or artificial eyelashes. Remove nail polish, including gel polish, and any artificial, gel, or acrylic nails if possible. Remove all jewelry including rings and body  piercing jewelry.     Wear causal clothing that is easy to take on and off. Consider your type of surgery.    Keep any valuables, jewelry, piercings at home. Please bring any specially ordered equipment (sling, braces) if indicated.    Arrange for a responsible person to drive you to and from the hospital on the day of your surgery. Please confirm the visitor policy for the day of your procedure when you receive your phone call with an arrival time.     Call the surgeon's office with any new illnesses, exposures, or additional questions prior to surgery.    Please reference your “My Surgical Experience Booklet” for additional information to prepare for your upcoming surgery.

## 2024-10-10 NOTE — ANESTHESIA PREPROCEDURE EVALUATION
Procedure:  CHEILECTOMY W IMPLANT (Left: Foot)    Relevant Problems   CARDIO   (+) Essential hypertension   (+) Hyperlipidemia      /RENAL   (+) Malignant neoplasm prostate (HCC)      MUSCULOSKELETAL   (+) Arthritis of knee, right     Smoking Status: Former - 4.5 pack years   Quit Smokin85   Smokeless Tobacco Status: Never   Alcohol use: Yes; 21.0 standard drinks of alcohol per week   Drug use: No        Physical Exam    Airway  Comment: solis  Mallampati score: II  TM Distance: >3 FB  Neck ROM: full     Dental   No notable dental hx     Cardiovascular  Rhythm: regular, Rate: normal    Pulmonary   Breath sounds clear to auscultation    Other Findings  Intercisor Distance > 3cm          Anesthesia Plan  ASA Score- 2     Anesthesia Type- general with ASA Monitors.         Additional Monitors:     Airway Plan: LMA.    Comment: Discussed benefits/risks of general anesthesia including possibility of mouth/throat pain, injury to lips/teeth, nausea/vomiting, and surgical pain along with more rare complications such as stroke, MI, pneumonia, aspiration, and injury to blood vessels. All questions answered.    Discussed nerve block to assist with post-operative analgesia. Discussed possibility of uncommon complications including permanent nerve injury, damage to blood vessels, infection local anesthetic toxicity, and nerve block failure. Patient understands and wishes to proceed.       .       Plan Factors-Exercise tolerance (METS): >4 METS.    Chart reviewed. EKG reviewed.  Existing labs reviewed.     Patient is not a current smoker.      Obstructive sleep apnea risk education given perioperatively.        Induction- intravenous.    Postoperative Plan- Plan for postoperative opioid use. Planned trial extubation        Informed Consent- Anesthetic plan and risks discussed with patient.

## 2024-10-11 ENCOUNTER — ANESTHESIA (OUTPATIENT)
Dept: PERIOP | Facility: HOSPITAL | Age: 69
End: 2024-10-11
Payer: MEDICARE

## 2024-10-11 ENCOUNTER — APPOINTMENT (OUTPATIENT)
Dept: RADIOLOGY | Facility: HOSPITAL | Age: 69
End: 2024-10-11
Payer: MEDICARE

## 2024-10-11 ENCOUNTER — HOSPITAL ENCOUNTER (OUTPATIENT)
Facility: HOSPITAL | Age: 69
Setting detail: OUTPATIENT SURGERY
Discharge: HOME | End: 2024-10-11
Attending: PODIATRIST | Admitting: PODIATRIST
Payer: MEDICARE

## 2024-10-11 VITALS
HEART RATE: 83 BPM | DIASTOLIC BLOOD PRESSURE: 73 MMHG | SYSTOLIC BLOOD PRESSURE: 146 MMHG | RESPIRATION RATE: 16 BRPM | WEIGHT: 195.11 LBS | HEIGHT: 73 IN | BODY MASS INDEX: 25.86 KG/M2 | TEMPERATURE: 98 F | OXYGEN SATURATION: 96 %

## 2024-10-11 DIAGNOSIS — M20.22 HALLUX RIGIDUS OF LEFT FOOT: Primary | ICD-10-CM

## 2024-10-11 DIAGNOSIS — Z98.890 POST-OPERATIVE STATE: ICD-10-CM

## 2024-10-11 PROCEDURE — C1776 JOINT DEVICE (IMPLANTABLE): HCPCS | Performed by: PODIATRIST

## 2024-10-11 PROCEDURE — 73630 X-RAY EXAM OF FOOT: CPT

## 2024-10-11 PROCEDURE — NC001 PR NO CHARGE: Performed by: PODIATRIST

## 2024-10-11 PROCEDURE — 20600 DRAIN/INJ JOINT/BURSA W/O US: CPT | Performed by: PODIATRIST

## 2024-10-11 PROCEDURE — 99024 POSTOP FOLLOW-UP VISIT: CPT | Performed by: PODIATRIST

## 2024-10-11 PROCEDURE — 28291 CORRJ HALUX RIGDUS W/IMPLT: CPT | Performed by: PODIATRIST

## 2024-10-11 DEVICE — IMPLANTABLE DEVICE
Type: IMPLANTABLE DEVICE | Site: FOOT | Status: FUNCTIONAL
Brand: SWANSON

## 2024-10-11 RX ORDER — FENTANYL CITRATE 50 UG/ML
INJECTION, SOLUTION INTRAMUSCULAR; INTRAVENOUS AS NEEDED
Status: DISCONTINUED | OUTPATIENT
Start: 2024-10-11 | End: 2024-10-11

## 2024-10-11 RX ORDER — ACETAMINOPHEN 325 MG/1
650 TABLET ORAL EVERY 4 HOURS PRN
Status: DISCONTINUED | OUTPATIENT
Start: 2024-10-11 | End: 2024-10-11 | Stop reason: HOSPADM

## 2024-10-11 RX ORDER — MIDAZOLAM HYDROCHLORIDE 2 MG/2ML
INJECTION, SOLUTION INTRAMUSCULAR; INTRAVENOUS AS NEEDED
Status: DISCONTINUED | OUTPATIENT
Start: 2024-10-11 | End: 2024-10-11

## 2024-10-11 RX ORDER — OXYCODONE AND ACETAMINOPHEN 5; 325 MG/1; MG/1
1 TABLET ORAL EVERY 4 HOURS PRN
Qty: 10 TABLET | Refills: 0 | Status: SHIPPED | OUTPATIENT
Start: 2024-10-11

## 2024-10-11 RX ORDER — BUPIVACAINE HYDROCHLORIDE 5 MG/ML
INJECTION, SOLUTION EPIDURAL; INTRACAUDAL AS NEEDED
Status: DISCONTINUED | OUTPATIENT
Start: 2024-10-11 | End: 2024-10-11 | Stop reason: HOSPADM

## 2024-10-11 RX ORDER — ACETAMINOPHEN 325 MG/1
975 TABLET ORAL ONCE
Status: COMPLETED | OUTPATIENT
Start: 2024-10-11 | End: 2024-10-11

## 2024-10-11 RX ORDER — SODIUM CHLORIDE 9 MG/ML
125 INJECTION, SOLUTION INTRAVENOUS CONTINUOUS
Status: DISCONTINUED | OUTPATIENT
Start: 2024-10-11 | End: 2024-10-11 | Stop reason: HOSPADM

## 2024-10-11 RX ORDER — FENTANYL CITRATE/PF 50 MCG/ML
25 SYRINGE (ML) INJECTION
Status: DISCONTINUED | OUTPATIENT
Start: 2024-10-11 | End: 2024-10-11 | Stop reason: HOSPADM

## 2024-10-11 RX ORDER — ONDANSETRON 2 MG/ML
4 INJECTION INTRAMUSCULAR; INTRAVENOUS ONCE AS NEEDED
Status: DISCONTINUED | OUTPATIENT
Start: 2024-10-11 | End: 2024-10-11 | Stop reason: HOSPADM

## 2024-10-11 RX ORDER — PROPOFOL 10 MG/ML
INJECTION, EMULSION INTRAVENOUS AS NEEDED
Status: DISCONTINUED | OUTPATIENT
Start: 2024-10-11 | End: 2024-10-11

## 2024-10-11 RX ORDER — CEFAZOLIN SODIUM 2 G/50ML
2000 SOLUTION INTRAVENOUS ONCE
Status: COMPLETED | OUTPATIENT
Start: 2024-10-11 | End: 2024-10-11

## 2024-10-11 RX ORDER — MAGNESIUM HYDROXIDE 1200 MG/15ML
LIQUID ORAL AS NEEDED
Status: DISCONTINUED | OUTPATIENT
Start: 2024-10-11 | End: 2024-10-11 | Stop reason: HOSPADM

## 2024-10-11 RX ORDER — OXYCODONE HYDROCHLORIDE 5 MG/1
5 TABLET ORAL EVERY 4 HOURS PRN
Status: DISCONTINUED | OUTPATIENT
Start: 2024-10-11 | End: 2024-10-11 | Stop reason: HOSPADM

## 2024-10-11 RX ORDER — ONDANSETRON 2 MG/ML
4 INJECTION INTRAMUSCULAR; INTRAVENOUS EVERY 6 HOURS PRN
Status: DISCONTINUED | OUTPATIENT
Start: 2024-10-11 | End: 2024-10-11 | Stop reason: HOSPADM

## 2024-10-11 RX ORDER — KETOROLAC TROMETHAMINE 30 MG/ML
INJECTION, SOLUTION INTRAMUSCULAR; INTRAVENOUS AS NEEDED
Status: DISCONTINUED | OUTPATIENT
Start: 2024-10-11 | End: 2024-10-11

## 2024-10-11 RX ORDER — ONDANSETRON 2 MG/ML
INJECTION INTRAMUSCULAR; INTRAVENOUS AS NEEDED
Status: DISCONTINUED | OUTPATIENT
Start: 2024-10-11 | End: 2024-10-11

## 2024-10-11 RX ORDER — TRIAMCINOLONE ACETONIDE 40 MG/ML
INJECTION, SUSPENSION INTRA-ARTICULAR; INTRAMUSCULAR AS NEEDED
Status: DISCONTINUED | OUTPATIENT
Start: 2024-10-11 | End: 2024-10-11 | Stop reason: HOSPADM

## 2024-10-11 RX ORDER — DEXAMETHASONE SODIUM PHOSPHATE 10 MG/ML
INJECTION, SOLUTION INTRAMUSCULAR; INTRAVENOUS AS NEEDED
Status: DISCONTINUED | OUTPATIENT
Start: 2024-10-11 | End: 2024-10-11

## 2024-10-11 RX ORDER — LIDOCAINE HYDROCHLORIDE 20 MG/ML
INJECTION, SOLUTION EPIDURAL; INFILTRATION; INTRACAUDAL; PERINEURAL AS NEEDED
Status: DISCONTINUED | OUTPATIENT
Start: 2024-10-11 | End: 2024-10-11

## 2024-10-11 RX ORDER — LIDOCAINE HYDROCHLORIDE 10 MG/ML
INJECTION, SOLUTION EPIDURAL; INFILTRATION; INTRACAUDAL; PERINEURAL AS NEEDED
Status: DISCONTINUED | OUTPATIENT
Start: 2024-10-11 | End: 2024-10-11 | Stop reason: HOSPADM

## 2024-10-11 RX ADMIN — ACETAMINOPHEN 975 MG: 325 TABLET, FILM COATED ORAL at 06:06

## 2024-10-11 RX ADMIN — DEXAMETHASONE SODIUM PHOSPHATE 10 MG: 10 INJECTION INTRAMUSCULAR; INTRAVENOUS at 07:44

## 2024-10-11 RX ADMIN — SODIUM CHLORIDE 125 ML/HR: 0.9 INJECTION, SOLUTION INTRAVENOUS at 06:45

## 2024-10-11 RX ADMIN — PROPOFOL 200 MG: 10 INJECTION, EMULSION INTRAVENOUS at 07:39

## 2024-10-11 RX ADMIN — FENTANYL CITRATE 25 MCG: 50 INJECTION INTRAMUSCULAR; INTRAVENOUS at 08:13

## 2024-10-11 RX ADMIN — ONDANSETRON 4 MG: 2 INJECTION INTRAMUSCULAR; INTRAVENOUS at 08:19

## 2024-10-11 RX ADMIN — FENTANYL CITRATE 50 MCG: 50 INJECTION INTRAMUSCULAR; INTRAVENOUS at 07:26

## 2024-10-11 RX ADMIN — CEFAZOLIN SODIUM 2000 MG: 2 SOLUTION INTRAVENOUS at 07:45

## 2024-10-11 RX ADMIN — LIDOCAINE HYDROCHLORIDE 100 MG: 20 INJECTION, SOLUTION EPIDURAL; INFILTRATION; INTRACAUDAL at 07:39

## 2024-10-11 RX ADMIN — FENTANYL CITRATE 25 MCG: 50 INJECTION INTRAMUSCULAR; INTRAVENOUS at 09:00

## 2024-10-11 RX ADMIN — MIDAZOLAM 2 MG: 1 INJECTION INTRAMUSCULAR; INTRAVENOUS at 07:26

## 2024-10-11 RX ADMIN — KETOROLAC TROMETHAMINE 30 MG: 30 INJECTION, SOLUTION INTRAMUSCULAR; INTRAVENOUS at 08:41

## 2024-10-11 NOTE — DISCHARGE INSTR - AVS FIRST PAGE
Dr. Randall Blanchard, DPJOEL  Post-Operative Instructions    1. Take your prescribed medication as directed.   2. Upon arrival at home, lie down and elevate your surgical foot on 2 pillows.  3. Stay off your feet as much as possible for the first 24-48 hours. This is when your feet first swell and may become painful. After 48 hours you may begin limited walking following these restrictions:   Weight-bearing as tolerated in surgical shoe.   4. Drink large quantities of water. Consume no alcohol. Continue a well-balanced diet.  5. Report any unusual discomfort or fever to this office.  6. A limited amount of discomfort and swelling is to be expected. In some cases the skin may take on a bruised appearance. The surgical cleansing solution that was applied to your foot prior to the operation is dark in color and the operation site may appear to be oozing when it actually is not.  7. A slight amount of blood is to be expected, and is no cause for alarm. Do not remove the dressings. If there is active bleeding and if the bleeding persists, add additional gauze to the bandage, apply direct pressure, elevate your feet and call this office.  8. Do not get the dressings wet. As regular bathing may be inconvenient, sponge baths are recommended.   9. When anesthesia wears off and if any discomfort should be present, apply an ice pack directly over the operated area for 15 minute intervals for several hours or until the pain leaves. (USE IN EXCESS OF 15 MINUTES COULD CAUSE FROSTBITE). Do not use hot water bags or electric pads. A convenient icepack can be made by placing ice cubes in a plastic bag and covering this with a towel.  10. Take over-the-counter laxative for constipation, this is common with use of narcotic medications.

## 2024-10-11 NOTE — DISCHARGE SUMMARY
Discharge Summary Outpatient Procedure Podiatry -   Franklyn Hogan 69 y.o. male MRN: [id number]  Unit/Bed#: OR POOL Encounter: [id number]    Admission Date: 10/11/2024     Admitting Diagnosis: Hallux rigidus of left foot [M20.22]    Discharge Diagnosis: same    Procedures Performed: CHEILECTOMY W IMPLANT: 54025 (CPT®)  INJECTION FOOT:     Complications: none    Condition at Discharge: stable    Discharge instructions/Information to patient and family:   See after visit summary for information provided to patient and family.      Provisions for Follow-Up Care/Important appointments:  See after visit summary for information related to follow-up care and any pertinent home health orders.      Discharge Medications:  See after visit summary for reconciled discharge medications provided to patient and family.

## 2024-10-11 NOTE — OP NOTE
OPERATIVE REPORT - Podiatry  PATIENT NAME: Franklyn Hogan    :  1955  MRN: [id number]  Pt Location: [facility] OR ROOM 03    SURGERY DATE: 10/11/2024    Surgeons and Role:     * Randall Blanchard DPM - Primary     * Poncho Ricks DPM - Assisting    Pre-op Diagnosis:  Hallux rigidus of left foot [M20.22]    Post-Op Diagnosis Codes:     * Hallux rigidus of left foot [M20.22]    Procedure(s) (LRB):  CHEILECTOMY W IMPLANT (Left)  INJECTION FOOT (Right)    Specimen(s):  * No specimens in log *    Estimated Blood Loss:   Minimal    Anesthesia Type:   General/LMA with 10 ml of 1% Lidocaine and 0.5% Bupivacaine in a 1:1 mixture.  9 mL of 0.5 bupivacaine were given postoperatively    Hemostasis:  Pneumatic tourniquet applied to left lower extremity for 54 minutes at approximately 250 mmHg    Materials:  Implant Name Type Inv. Item Serial No.  Lot No. LRB No. Used Action   MONROE FLEXIBLE TOE IMPLANTS W/ GROMMETS [facility] [id number]  MONROE FLEXIBLE TOE IMPLANTS W/ GROMMETS 3S  [facility] [id number] Left 1 Implanted     3-0 Vicryl  4-0 Vicryl  4-0 nylon    Operative Findings:  Consistent with diagnosis    Complications:   None    Procedure and Technique:     Under mild sedation, the patient was brought into the operating room and placed on the operating room table in the supine position. IV sedation was achieved by anesthesia team and a universal timeout was performed where all parties are in agreement of correct patient, correct procedure and correct site. A pneumatic tourniquet was then placed over the patient's left lower extremity with ample padding. A perez block was performed consisting of 10 ml of 1% Lidocaine and 0.5% Bupivacaine in a 1:1 mixture. The foot was then prepped and draped in the usual aseptic manner. An esmarch bandage was used to exsangunate the foot and the pneumatic tourniquet was then inflated to 250mmHg.    Right First MTPJ Injection    Attention was directed to the dorsal lateral aspect of  the right first MTPJ.  An injection consisting of 0.5 mL of Kenalog 40 and 1 mL of 1% lidocaine plain was injected along the lateral aspect of the right first MPJ.    Left First MTPJ Implant    Attention was then directed to the dorsomedial aspect of the left first ray where a linear longitudinal incision was made. The incision was deepened through the subcutaneous tissues using sharp and blunt dissection. Care was taken to identify and retract all vital neurovascular structures. All bleeders were ligated and cauterized as necessary.   At this time, a linear longitudinal capsular and periosteal incision was made at the first metatarsophalangeal joint. The capsule was reflected from the medial and lateral aspect of the first metatarsal head. The joint was then inspected for any cartilaginous defects, which were copious across the metatarsal head with dorsal and lateral spurring removed from MTPJ.  At this time the base of the proximal phalanx and the head of the 1st metatarsal were removed with the use of sagittal saw. Prominent exostosis was denuded using combination of sagittal saw and rongeur to the metatarsal and base of proximal phalanx. A Sizer was then used to determine the correct size implant.  Per manufacture's guidelines, Ocampo implant 3S was deemed acceptable.  Next, the 1st metatarsal head and base of proximal phalanx was prepped for implantation of implant.  At this time, the incision was copiously irrigated with normal sterile saline.  The Ocampo implant was then inserted according to standard manufacture protocol.  First MTPJ motion was noted to be improved above baseline. The incision was again irrigated with normal sterile saline.  Capsule was repaired with 3-0 Vicryl.  Subcutaneous closure was obtained with 4-0 Vicryl.  Skin was closed with 3-0 nylon in interrupted horizontal technique.    The foot was then cleansed and dried. A postoperative injection consisting of 6 ml of 0.5% Bupivacaine was  "performed. The incision site was dressed with betadine soaked adaptic, gauze. This was then covered with a Chen and an ACE wrap.     The tourniquet was deflated at approximately 54 min and normal hyperemic response was noted to all digits. The patient tolerated the procedure and anesthesia well without immediate complications and transferred to PACU with vital signs stable.     Dr. Blanchard was present during the entire procedure and participated in all key aspects.    SIGNATURE: Poncho Ricks DPM  DATE: October 11, 2024  TIME: 8:59 AM      Portions of the record may have been created with voice recognition software. Occasional wrong word or \"sound a like\" substitutions may have occurred due to the inherent limitations of voice recognition software. Read the chart carefully and recognize, using context, where substitutions have occurred.            "

## 2024-10-11 NOTE — ANESTHESIA POSTPROCEDURE EVALUATION
Post-Op Assessment Note    CV Status:  Stable  Pain Score: 0    Pain management: adequate       Mental Status:  Alert and awake   Hydration Status:  Stable   PONV Controlled:  None   Airway Patency:  Patent     Post Op Vitals Reviewed: Yes    No anethesia notable event occurred.    Staff: Anesthesiologist, CRNA           Last Filed PACU Vitals:  Vitals Value Taken Time   Temp 97.8    Pulse 84 10/11/24 0900   /79 10/11/24 0858   Resp 16    SpO2 99 % 10/11/24 0900   Vitals shown include unfiled device data.    Modified Murphy:  No data recorded

## 2024-10-11 NOTE — ANESTHESIA POSTPROCEDURE EVALUATION
Post-Op Assessment Note    CV Status:  Stable    Pain management: adequate       Mental Status:  Awake   Hydration Status:  Euvolemic   PONV Controlled:  Controlled   Airway Patency:  Patent     Post Op Vitals Reviewed: Yes    No anethesia notable event occurred.    Staff: Anesthesiologist           Last Filed PACU Vitals:  Vitals Value Taken Time   Temp 97.4 °F (36.3 °C) 10/11/24 0930   Pulse 68 10/11/24 0959   /77 10/11/24 0930   Resp 18 10/11/24 0930   SpO2 97 % 10/11/24 0959   Vitals shown include unfiled device data.    Modified Murphy:  Activity: 2 (10/11/2024  9:30 AM)  Respiration: 2 (10/11/2024  9:30 AM)  Circulation: 2 (10/11/2024  9:30 AM)  Consciousness: 2 (10/11/2024  9:30 AM)  Oxygen Saturation: 2 (10/11/2024  9:30 AM)  Modified Murphy Score: 10 (10/11/2024  9:30 AM)

## 2024-10-11 NOTE — INTERVAL H&P NOTE
H&P reviewed. After examining the patient I find no changes in the patients condition since the H&P had been written.    Vitals:    10/11/24 0600   BP: 136/82   Pulse: 82   Resp: 16   Temp: 97.8 °F (36.6 °C)   SpO2: 99%

## 2024-10-16 ENCOUNTER — OFFICE VISIT (OUTPATIENT)
Dept: PODIATRY | Facility: CLINIC | Age: 69
End: 2024-10-16

## 2024-10-16 DIAGNOSIS — M20.22 HALLUX RIGIDUS OF LEFT FOOT: Primary | ICD-10-CM

## 2024-10-16 PROCEDURE — 99024 POSTOP FOLLOW-UP VISIT: CPT | Performed by: PODIATRIST

## 2024-10-16 RX ORDER — IBUPROFEN 600 MG/1
600 TABLET, FILM COATED ORAL
Qty: 120 TABLET | Refills: 2 | Status: SHIPPED | OUTPATIENT
Start: 2024-10-16 | End: 2025-01-14

## 2024-10-16 NOTE — PROGRESS NOTES
Patient presents 5 days post implant arthroplasty left first MPJ.  Patient relates significant pain.  He did not feel the pain medication was very helpful.  He states that he is typically taking 2 Advil daily at this time with 2 extra strength Tylenol.    On exam, surgical site healing uneventfully.  Mild edema noted.  No evidence of infection.  Incision line still bleeding.  Dressing change performed.  Patient told to continue with current orders.    Patient placed on ibuprofen 600 mg 4 times daily and as many as 3 extra strength Tylenol daily.  Reappoint 1 week for possible suture removal.

## 2024-10-23 ENCOUNTER — OFFICE VISIT (OUTPATIENT)
Dept: PODIATRY | Facility: CLINIC | Age: 69
End: 2024-10-23

## 2024-10-23 VITALS — BODY MASS INDEX: 25.71 KG/M2 | HEIGHT: 73 IN | RESPIRATION RATE: 18 BRPM | WEIGHT: 194 LBS

## 2024-10-23 DIAGNOSIS — M20.22 HALLUX RIGIDUS OF LEFT FOOT: Primary | ICD-10-CM

## 2024-10-23 PROCEDURE — 99024 POSTOP FOLLOW-UP VISIT: CPT | Performed by: PODIATRIST

## 2024-10-23 NOTE — PROGRESS NOTES
Patient presents 12 days post implant arthroplasty each foot.  Surgical site healing uneventfully.  No sign of infection or wound dehiscence.  All sutures were removed.  Patient may get foot wet tomorrow but is to remain in surgical shoe for 1 more week.  Reappoint 1 week to assess.

## 2024-10-28 ENCOUNTER — OFFICE VISIT (OUTPATIENT)
Dept: FAMILY MEDICINE CLINIC | Facility: CLINIC | Age: 69
End: 2024-10-28
Payer: MEDICARE

## 2024-10-28 VITALS
DIASTOLIC BLOOD PRESSURE: 78 MMHG | TEMPERATURE: 97.8 F | OXYGEN SATURATION: 99 % | HEART RATE: 88 BPM | WEIGHT: 197.8 LBS | BODY MASS INDEX: 26.21 KG/M2 | HEIGHT: 73 IN | SYSTOLIC BLOOD PRESSURE: 130 MMHG

## 2024-10-28 DIAGNOSIS — L91.8 INFLAMED SKIN TAG: ICD-10-CM

## 2024-10-28 DIAGNOSIS — C61 MALIGNANT NEOPLASM PROSTATE (HCC): ICD-10-CM

## 2024-10-28 DIAGNOSIS — Z23 NEED FOR COVID-19 VACCINE: Primary | ICD-10-CM

## 2024-10-28 PROCEDURE — 11200 RMVL SKIN TAGS UP TO&INC 15: CPT | Performed by: FAMILY MEDICINE

## 2024-10-28 NOTE — PROGRESS NOTES
Patient complains of irritated skin tag below right eye present for years.    Skin tag removal    Date/Time: 10/28/2024 2:00 PM    Performed by: Dylan Wilkins DO  Authorized by: Dylan Wilkins DO  Universal Protocol:  procedure performed by consultantConsent: Verbal consent obtained.  Risks and benefits: risks, benefits and alternatives were discussed  Consent given by: patient  Patient understanding: patient states understanding of the procedure being performed  Required items: required blood products, implants, devices, and special equipment available  Patient identity confirmed: verbally with patient      Procedure Details - Skin Tag Destruction:     Up to 15      Body area:  Head/neck    Head/neck location:  R cheek    Initial size (mm):  2    Final defect size (mm):  2    Malignancy: benign lesion      Destruction method: scissors used for extraction

## 2024-10-30 ENCOUNTER — OFFICE VISIT (OUTPATIENT)
Dept: PODIATRY | Facility: CLINIC | Age: 69
End: 2024-10-30

## 2024-10-30 VITALS — RESPIRATION RATE: 18 BRPM | HEIGHT: 73 IN | BODY MASS INDEX: 25.84 KG/M2 | WEIGHT: 195 LBS

## 2024-10-30 DIAGNOSIS — M20.22 HALLUX RIGIDUS OF LEFT FOOT: Primary | ICD-10-CM

## 2024-10-30 PROCEDURE — 99024 POSTOP FOLLOW-UP VISIT: CPT | Performed by: PODIATRIST

## 2024-10-30 NOTE — PROGRESS NOTES
Patient patient presents 19 days post implant arthroplasty left foot.  Mild discomfort related.  Patient has discontinued his ibuprofen.  He notes that the toe was slightly elevated off the ground and it is painful to plantarflex the toe.    On exam, surgical site is healed with no evidence of infection and minimal edema.  Patient told he may return to running shoe in 2 days.  He should return to ibuprofen for achiness.  Range of motion exercises encouraged.  Reappoint in approximately 3 weeks.

## 2024-11-11 NOTE — PROGRESS NOTES
Assessment  1  Hyperlipidemia (272 4) (E78 5)    Plan  Benign essential hypertension, Hyperlipidemia    · (Q) COMPREHENSIVE METABOLIC PANEL W/O ALT; Status:Active; Requested  for:21Apr2018;    · (Q) LIPID PANEL WITH DIRECT LDL; Status:Active; Requested for:21Apr2018;    · (Q) TSH, 3RD GENERATION; Status:Active; Requested for:21Apr2018;   Encounter for prostate cancer screening    · (Q) PSA, TOTAL WITH REFLEX TO PSA, FREE; Status:Active; Requested for:21Apr2018; Discussion/Summary    Hyperlipidemia well controlled  Total cholesterol 175 with an LDL of 100 and HDL of 56  Continue with current dosage of Vytorin  Other lab work unremarkable  Thyroid normal chemistry panel within normal limits  Will give flu vaccine and Zostavax today  Recheck in 6 months with labs prior to next appointment  Chief Complaint  Pt presents for 6 month check and to discuss blood work from 10/24/2017  Pt would like Shingles vaccine and Flu shot today  Pt is UTD with Colonoscopy  Patient is here today for follow up of chronic conditions described in HPI  History of Present Illness  Patient was seen for routine follow-up of chronic medical problems and review his recent blood work  He is being treated with Vytorin for hyperlipidemia  He has no other issues or concerns at this time  He is compliant with his medications and has no side effects from  Review of Systems    Constitutional: No fever or chills, feels well, no tiredness, no recent weight gain or weight loss  Eyes: No complaints of eye pain, no red eyes, no discharge from eyes, no itchy eyes  ENT: no complaints of earache, no hearing loss, no nosebleeds, no nasal discharge, no sore throat, no hoarseness  Cardiovascular: No complaints of slow heart rate, no fast heart rate, no chest pain, no palpitations, no leg claudication, no lower extremity  Respiratory: No complaints of shortness of breath, no wheezing, no cough, no SOB on exertion, no orthopnea or PND  Gastrointestinal: No complaints of abdominal pain, no constipation, no nausea or vomiting, no diarrhea or bloody stools  Genitourinary: No complaints of dysuria, no incontinence, no hesitancy, no nocturia, no genital lesion, no testicular pain  Musculoskeletal: No complaints of arthralgia, no myalgias, no joint swelling or stiffness, no limb pain or swelling  Integumentary: No complaints of skin rash or skin lesions, no itching, no skin wound, no dry skin  Neurological: No compliants of headache, no confusion, no convulsions, no numbness or tingling, no dizziness or fainting, no limb weakness, no difficulty walking  Psychiatric: Is not suicidal, no sleep disturbances, no anxiety or depression, no change in personality, no emotional problems  Endocrine: No complaints of proptosis, no hot flashes, no muscle weakness, no erectile dysfunction, no deepening of the voice, no feelings of weakness  Hematologic/Lymphatic: No complaints of swollen glands, no swollen glands in the neck, does not bleed easily, no easy bruising  Active Problems  1  Arthritis of knee, right (716 96) (M17 11)   2  Erectile dysfunction of non-organic origin (302 72) (F52 21)   3  Hyperlipidemia (272 4) (E78 5)   4  IBS (irritable bowel syndrome) (564 1) (K58 9)   5  Nonvenomous insect bite of thorax, initial encounter (911 4,E906 4)   (S15 28WL,X48  XXXA)   6  Tick bite, initial encounter (919 4,E906 4) (W57 XXXA)   7  Vitamin D deficiency (268 9) (E55 9)    Past Medical History  1  History of Abnormal cardiovascular stress test (794 39) (R94 39)   2  History of Abnormal electrocardiogram (794 31) (R94 31)   3  History of Acute Left Rotator Cuff Sprain (Capsule) (840 4)   4  History of Acute sinusitis (461 9) (J01 90)   5  History of Benign essential hypertension (401 1) (I10)   6  History of Cough (786 2) (R05)   7  History of Hallux Rigidus (735 2)   8  History of acute otitis media (V12 49) (Z86 69)   9   History of Joint pain, knee (719 46) (M25 569)   10  History of Need for prophylactic vaccination and inoculation against influenza (V04 81)    (Z23)   11  History of Organic impotence (607 84) (N52 9)   12  History of Pulled muscle (848 9) (T14 8XXA)   13  History of Rapid pulse (785 0) (R00 0)   14  History of Right-sided chest wall pain (786 52) (R07 89)   15  History of Shoulder joint pain, unspecified laterality   16  History of Skin lesion (709 9) (L98 9)   17  History of Special screening examination for neoplasm of prostate (V76 44) (Z12 5)   18  History of Tachycardia (785 0) (R00 0)    The active problems and past medical history were reviewed and updated today  Surgical History  1  History of Spinal Diskectomy   2  History of Surgery Testis Reduction Of Torsion Of Testis    Family History  Mother    1  Family history of Cancer  Father    2  Family history of Hepatitis C    Social History   · Being A Social Drinker   · Never A Smoker    Current Meds   1  Adult Aspirin EC Low Strength 81 MG Oral Tablet Delayed Release; Take 1 tablet daily; Therapy: ([place]) to Recorded   2  Cialis 20 MG Oral Tablet; TAKE 1/2 TO 1 TABLET 1 HOURBEFORE ACTIVITY AS   DIRECTED; Therapy: 08Apr2016 to (Evaluate:12Jan2018)  Requested for: 80Jyb9097; Last   Rx:[id number] Ordered   3  Ezetimibe-Simvastatin 10-10 MG Oral Tablet; Take 1 tablet daily; Therapy: [id number] to (Emy Courtney)  Requested for: 64Kro9175; Last   Rx:[id number] Ordered    The medication list was reviewed and updated today  Allergies  1   No Known Drug Allergies    Vitals  Vital Signs    Recorded: 11TWA2099 09:02AM   Temperature 98 3 F, Tympanic   Heart Rate 89   Pulse Quality Normal   Respiration Quality Normal   Respiration 20   Systolic 361, LUE, Sitting   Diastolic 88, LUE, Sitting   Height 6 ft 1 in   Weight 203 lb 4 oz   BMI Calculated 26 82   BSA Calculated 2 17   O2 Saturation 98, RA   Pain Scale 0     Physical Exam    Constitutional General appearance: No acute distress, well appearing and well nourished  Pulmonary   Respiratory effort: No increased work of breathing or signs of respiratory distress  Auscultation of lungs: Clear to auscultation, equal breath sounds bilaterally, no wheezes, no rales, no rhonci  Cardiovascular   Palpation of heart: Normal PMI, no thrills  Auscultation of heart: Normal rate and rhythm, normal S1 and S2, without murmurs  Examination of extremities for edema and/or varicosities: Normal     Carotid pulses: Normal     Lymphatic   Palpation of lymph nodes in neck: No lymphadenopathy      Musculoskeletal   Gait and station: Normal     Psychiatric   Orientation to person, place and time: Normal     Mood and affect: Normal          Signatures   Electronically signed by : Bijal Parra DO; Nov 6 2017  9:31AM EST                       (Author) Yes

## 2024-11-14 ENCOUNTER — OFFICE VISIT (OUTPATIENT)
Dept: PODIATRY | Facility: CLINIC | Age: 69
End: 2024-11-14

## 2024-11-14 VITALS
HEIGHT: 73 IN | BODY MASS INDEX: 26 KG/M2 | WEIGHT: 196.2 LBS | SYSTOLIC BLOOD PRESSURE: 116 MMHG | DIASTOLIC BLOOD PRESSURE: 72 MMHG | HEART RATE: 87 BPM

## 2024-11-14 DIAGNOSIS — M20.22 HALLUX RIGIDUS OF LEFT FOOT: Primary | ICD-10-CM

## 2024-11-14 PROCEDURE — 99024 POSTOP FOLLOW-UP VISIT: CPT | Performed by: PODIATRIST

## 2024-11-14 RX ORDER — MELOXICAM 15 MG/1
15 TABLET ORAL DAILY
Qty: 90 TABLET | Refills: 0 | Status: SHIPPED | OUTPATIENT
Start: 2024-11-14 | End: 2025-02-12

## 2024-11-14 NOTE — PROGRESS NOTES
Patient presents approximately 4 weeks post implant arthroplasty left foot.  Patient is wearing a running shoe reasonably comfortably with only mild discomfort related.  He does have pain on the bottom of the foot when in the shower.  He has not been taking ibuprofen.  Recommended another prescription for meloxicam as he had done well with that in the past for hallux rigidus.  90-day supply was provided.    The patient is leaving for [place] in 2 days.  He was told to minimize walking.  He may ride a bike pain permitted.  He will be reassessed in 7 weeks.

## 2024-12-01 DIAGNOSIS — I10 ESSENTIAL HYPERTENSION: ICD-10-CM

## 2024-12-03 ENCOUNTER — TELEPHONE (OUTPATIENT)
Age: 69
End: 2024-12-03

## 2024-12-03 RX ORDER — LISINOPRIL 5 MG/1
5 TABLET ORAL DAILY
Qty: 90 TABLET | Refills: 1 | Status: SHIPPED | OUTPATIENT
Start: 2024-12-03

## 2024-12-03 NOTE — TELEPHONE ENCOUNTER
Caller: Franklyn Hogan    Doctor and/or Office: Dr. Blanchard/Brie    #: [phone]    [place]ation: Surgery Patient is experiencing pain and spoke to Dr Blanchard through [facility]. The dr states an appt is needed. Patient is requesting a forced appt in [place] tomorrow or [place] early Thurs as he is then leaving for a trip. Please return call. Thank you

## 2024-12-04 ENCOUNTER — OFFICE VISIT (OUTPATIENT)
Dept: PODIATRY | Facility: CLINIC | Age: 69
End: 2024-12-04
Payer: MEDICARE

## 2024-12-04 VITALS — WEIGHT: 197 LBS | HEIGHT: 73 IN | BODY MASS INDEX: 26.11 KG/M2

## 2024-12-04 DIAGNOSIS — G62.9 PERIPHERAL NERVE DISORDER: Primary | ICD-10-CM

## 2024-12-04 PROCEDURE — 99213 OFFICE O/P EST LOW 20 MIN: CPT | Performed by: PODIATRIST

## 2024-12-04 RX ORDER — GABAPENTIN 300 MG/1
300 CAPSULE ORAL
Qty: 30 CAPSULE | Refills: 0 | Status: SHIPPED | OUTPATIENT
Start: 2024-12-04 | End: 2025-01-03

## 2024-12-04 NOTE — PROGRESS NOTES
Patient presents 7 weeks post implant arthroplasty left great toe joint.  Patient is doing well as far as the implant but for the past 4 5 days he developed burning discomfort at the tip of the left second toe.  He relates no trauma to the digit.  He initially felt that he had a blister but there is no blister present.  The discomfort is worse when he tries to sleep.    On exam, no deformity noted left second toe.  No skin dyscrasia present.    Explained that symptoms are consistent with neuritis.  Reassured patient that this discomfort is not secondary to his surgery.  He was placed on gabapentin 300 mg at bedtime.  Reappoint in approximately 4 weeks.

## 2025-01-06 ENCOUNTER — OFFICE VISIT (OUTPATIENT)
Dept: PODIATRY | Facility: CLINIC | Age: 70
End: 2025-01-06

## 2025-01-06 VITALS — RESPIRATION RATE: 18 BRPM | HEIGHT: 73 IN | BODY MASS INDEX: 25.84 KG/M2 | WEIGHT: 195 LBS

## 2025-01-06 DIAGNOSIS — M20.22 HALLUX RIGIDUS OF LEFT FOOT: Primary | ICD-10-CM

## 2025-01-06 DIAGNOSIS — G62.9 PERIPHERAL NERVE DISORDER: ICD-10-CM

## 2025-01-06 PROCEDURE — 99024 POSTOP FOLLOW-UP VISIT: CPT | Performed by: PODIATRIST

## 2025-01-06 RX ORDER — GABAPENTIN 300 MG/1
300 CAPSULE ORAL DAILY
Qty: 30 CAPSULE | Refills: 1 | Status: SHIPPED | OUTPATIENT
Start: 2025-01-06 | End: 2025-03-07

## 2025-01-06 NOTE — PROGRESS NOTES
Patient presents 3 months post implant arthroplasty left first MPJ.  Patient was doing well with the implant however he had paresthesia tip of left second toe.  He was placed on gabapentin and had improvement as he no longer has a burning sensation.  The tip of the toes still feels numb and there is paresthesia at the incision.    On examination, range of motion left first MPJ is restricted in dorsiflexion.  11 there is no pain.  Patient has been able to ski.    Treatment: Again prescribed 30 days of gabapentin 300 mg at bedtime with 1 refill.  Reappoint 3 months.

## 2025-02-15 DIAGNOSIS — E78.2 MIXED HYPERLIPIDEMIA: ICD-10-CM

## 2025-02-15 RX ORDER — EZETIMIBE AND SIMVASTATIN 10; 10 MG/1; MG/1
1 TABLET ORAL DAILY
Qty: 90 TABLET | Refills: 1 | Status: SHIPPED | OUTPATIENT
Start: 2025-02-15

## 2025-03-12 ENCOUNTER — APPOINTMENT (OUTPATIENT)
Dept: LAB | Facility: CLINIC | Age: 70
End: 2025-03-12
Payer: MEDICARE

## 2025-03-12 DIAGNOSIS — C61 MALIGNANT NEOPLASM PROSTATE (HCC): ICD-10-CM

## 2025-03-12 DIAGNOSIS — E78.2 MIXED HYPERLIPIDEMIA: ICD-10-CM

## 2025-03-12 LAB
ALBUMIN SERPL BCG-MCNC: 4.3 G/DL (ref 3.5–5)
ALP SERPL-CCNC: 51 U/L (ref 34–104)
ALT SERPL W P-5'-P-CCNC: 28 U/L (ref 7–52)
ANION GAP SERPL CALCULATED.3IONS-SCNC: 5 MMOL/L (ref 4–13)
AST SERPL W P-5'-P-CCNC: 20 U/L (ref 13–39)
BILIRUB SERPL-MCNC: 0.87 MG/DL (ref 0.2–1)
BUN SERPL-MCNC: 18 MG/DL (ref 5–25)
CALCIUM SERPL-MCNC: 9.4 MG/DL (ref 8.4–10.2)
CHLORIDE SERPL-SCNC: 101 MMOL/L (ref 96–108)
CHOLEST SERPL-MCNC: 205 MG/DL (ref ?–200)
CO2 SERPL-SCNC: 30 MMOL/L (ref 21–32)
CREAT SERPL-MCNC: 1.02 MG/DL (ref 0.6–1.3)
GFR SERPL CREATININE-BSD FRML MDRD: 75 ML/MIN/1.73SQ M
GLUCOSE P FAST SERPL-MCNC: 101 MG/DL (ref 65–99)
HDLC SERPL-MCNC: 58 MG/DL
LDLC SERPL CALC-MCNC: 122 MG/DL (ref 0–100)
POTASSIUM SERPL-SCNC: 4.5 MMOL/L (ref 3.5–5.3)
PROT SERPL-MCNC: 7 G/DL (ref 6.4–8.4)
PSA SERPL-MCNC: <0.008 NG/ML (ref 0–4)
SODIUM SERPL-SCNC: 136 MMOL/L (ref 135–147)
TRIGL SERPL-MCNC: 127 MG/DL (ref ?–150)
TSH SERPL DL<=0.05 MIU/L-ACNC: 1.58 UIU/ML (ref 0.45–4.5)

## 2025-03-12 PROCEDURE — 36415 COLL VENOUS BLD VENIPUNCTURE: CPT

## 2025-03-12 PROCEDURE — 84443 ASSAY THYROID STIM HORMONE: CPT

## 2025-03-12 PROCEDURE — 80053 COMPREHEN METABOLIC PANEL: CPT

## 2025-03-12 PROCEDURE — 84153 ASSAY OF PSA TOTAL: CPT

## 2025-03-12 PROCEDURE — 80061 LIPID PANEL: CPT

## 2025-03-16 DIAGNOSIS — G62.9 PERIPHERAL NERVE DISORDER: ICD-10-CM

## 2025-03-20 ENCOUNTER — TELEPHONE (OUTPATIENT)
Age: 70
End: 2025-03-20

## 2025-03-20 DIAGNOSIS — M20.21 HALLUX RIGIDUS OF RIGHT FOOT: Primary | ICD-10-CM

## 2025-03-20 RX ORDER — INDOMETHACIN 75 MG/1
75 CAPSULE, EXTENDED RELEASE ORAL 2 TIMES DAILY WITH MEALS
Qty: 20 CAPSULE | Refills: 0 | Status: SHIPPED | OUTPATIENT
Start: 2025-03-20 | End: 2025-03-30

## 2025-03-20 NOTE — TELEPHONE ENCOUNTER
Caller: Franklyn Hogan    Doctor and/or Office: Dr. Blanchard    CB#: [phone]    Escalation: Medication Patient states [facility] did not get his script that was called in. Please return call. Thank you

## 2025-03-24 ENCOUNTER — OFFICE VISIT (OUTPATIENT)
Dept: FAMILY MEDICINE CLINIC | Facility: CLINIC | Age: 70
End: 2025-03-24
Payer: MEDICARE

## 2025-03-24 VITALS
WEIGHT: 199 LBS | BODY MASS INDEX: 26.37 KG/M2 | HEIGHT: 73 IN | TEMPERATURE: 96.7 F | SYSTOLIC BLOOD PRESSURE: 132 MMHG | OXYGEN SATURATION: 97 % | DIASTOLIC BLOOD PRESSURE: 80 MMHG | HEART RATE: 77 BPM

## 2025-03-24 DIAGNOSIS — E78.5 HYPERLIPIDEMIA, UNSPECIFIED HYPERLIPIDEMIA TYPE: ICD-10-CM

## 2025-03-24 DIAGNOSIS — C61 MALIGNANT NEOPLASM PROSTATE (HCC): ICD-10-CM

## 2025-03-24 DIAGNOSIS — I10 ESSENTIAL HYPERTENSION: ICD-10-CM

## 2025-03-24 DIAGNOSIS — G62.9 PERIPHERAL NERVE DISORDER: ICD-10-CM

## 2025-03-24 DIAGNOSIS — Z00.00 MEDICARE ANNUAL WELLNESS VISIT, SUBSEQUENT: Primary | ICD-10-CM

## 2025-03-24 DIAGNOSIS — Z13.0 SCREENING FOR DEFICIENCY ANEMIA: ICD-10-CM

## 2025-03-24 PROCEDURE — 99214 OFFICE O/P EST MOD 30 MIN: CPT | Performed by: FAMILY MEDICINE

## 2025-03-24 PROCEDURE — G2211 COMPLEX E/M VISIT ADD ON: HCPCS | Performed by: FAMILY MEDICINE

## 2025-03-24 PROCEDURE — G0438 PPPS, INITIAL VISIT: HCPCS | Performed by: FAMILY MEDICINE

## 2025-03-24 RX ORDER — GABAPENTIN 300 MG/1
300 CAPSULE ORAL
Qty: 90 CAPSULE | Refills: 1 | Status: SHIPPED | OUTPATIENT
Start: 2025-03-24 | End: 2025-04-23

## 2025-03-24 NOTE — ASSESSMENT & PLAN NOTE
Well-controlled on lisinopril 10 mg daily  Orders:  •  Comprehensive metabolic panel; Future  •  TSH, 3rd generation with Free T4 reflex; Future

## 2025-03-24 NOTE — PATIENT INSTRUCTIONS
Medicare Preventive Visit Patient Instructions  Thank you for completing your Welcome to Medicare Visit or Medicare Annual Wellness Visit today. Your next wellness visit will be due in one year (3/25/2026).  The screening/preventive services that you may require over the next 5-10 years are detailed below. Some tests may not apply to you based off risk factors and/or age. Screening tests ordered at today's visit but not completed yet may show as past due. Also, please note that scanned in results may not display below.  Preventive Screenings:  Service Recommendations Previous Testing/Comments   Colorectal Cancer Screening  Colonoscopy    Fecal Occult Blood Test (FOBT)/Fecal Immunochemical Test (FIT)  Fecal DNA/Cologuard Test  Flexible Sigmoidoscopy Age: 45-75 years old   Colonoscopy: every 10 years (May be performed more frequently if at higher risk)  OR  FOBT/FIT: every 1 year  OR  Cologuard: every 3 years  OR  Sigmoidoscopy: every 5 years  Screening may be recommended earlier than age 45 if at higher risk for colorectal cancer. Also, an individualized decision between you and your healthcare provider will decide whether screening between the ages of 76-85 would be appropriate. Colonoscopy: 11/09/2020  FOBT/FIT: Not on file  Cologuard: Not on file  Sigmoidoscopy: Not on file    Screening Current     Prostate Cancer Screening Individualized decision between patient and health care provider in men between ages of 55-69   Medicare will cover every 12 months beginning on the day after your 50th birthday PSA: <0.008 ng/mL     History Prostate Cancer     Hepatitis C Screening Once for adults born between 1945 and 1965  More frequently in patients at high risk for Hepatitis C Hep C Antibody: 07/20/2021    Screening Current   Diabetes Screening 1-2 times per year if you're at risk for diabetes or have pre-diabetes Fasting glucose: 101 mg/dL (3/12/2025)  A1C: No results in last 5 years (No results in last 5  years)  Screening Current   Cholesterol Screening Once every 5 years if you don't have a lipid disorder. May order more often based on risk factors. Lipid panel: 03/12/2025  Screening Not Indicated  History Lipid Disorder      Other Preventive Screenings Covered by Medicare:  Abdominal Aortic Aneurysm (AAA) Screening: covered once if your at risk. You're considered to be at risk if you have a family history of AAA or a male between the age of 65-75 who smoking at least 100 cigarettes in your lifetime.  Lung Cancer Screening: covers low dose CT scan once per year if you meet all of the following conditions: (1) Age 55-77; (2) No signs or symptoms of lung cancer; (3) Current smoker or have quit smoking within the last 15 years; (4) You have a tobacco smoking history of at least 20 pack years (packs per day x number of years you smoked); (5) You get a written order from a healthcare provider.  Glaucoma Screening: covered annually if you're considered high risk: (1) You have diabetes OR (2) Family history of glaucoma OR (3)  aged 50 and older OR (4)  American aged 65 and older  Osteoporosis Screening: covered every 2 years if you meet one of the following conditions: (1) Have a vertebral abnormality; (2) On glucocorticoid therapy for more than 3 months; (3) Have primary hyperparathyroidism; (4) On osteoporosis medications and need to assess response to drug therapy.  HIV Screening: covered annually if you're between the age of 15-65. Also covered annually if you are younger than 15 and older than 65 with risk factors for HIV infection. For pregnant patients, it is covered up to 3 times per pregnancy.    Immunizations:  Immunization Recommendations   Influenza Vaccine Annual influenza vaccination during flu season is recommended for all persons aged >= 6 months who do not have contraindications   Pneumococcal Vaccine   * Pneumococcal conjugate vaccine = PCV13 (Prevnar 13), PCV15 (Vaxneuvance),  PCV20 (Prevnar 20)  * Pneumococcal polysaccharide vaccine = PPSV23 (Pneumovax) Adults 19-63 yo with certain risk factors or if 65+ yo  If never received any pneumonia vaccine: recommend Prevnar 20 (PCV20)  Give PCV20 if previously received 1 dose of PCV13 or PPSV23   Hepatitis B Vaccine 3 dose series if at intermediate or high risk (ex: diabetes, end stage renal disease, liver disease)   Respiratory syncytial virus (RSV) Vaccine - COVERED BY MEDICARE PART D  * RSVPreF3 (Arexvy) CDC recommends that adults 60 years of age and older may receive a single dose of RSV vaccine using shared clinical decision-making (SCDM)   Tetanus (Td) Vaccine - COST NOT COVERED BY MEDICARE PART B Following completion of primary series, a booster dose should be given every 10 years to maintain immunity against tetanus. Td may also be given as tetanus wound prophylaxis.   Tdap Vaccine - COST NOT COVERED BY MEDICARE PART B Recommended at least once for all adults. For pregnant patients, recommended with each pregnancy.   Shingles Vaccine (Shingrix) - COST NOT COVERED BY MEDICARE PART B  2 shot series recommended in those 19 years and older who have or will have weakened immune systems or those 50 years and older     Health Maintenance Due:      Topic Date Due   • Colorectal Cancer Screening  11/09/2030   • Hepatitis C Screening  Completed     Immunizations Due:  There are no preventive care reminders to display for this patient.  Advance Directives   What are advance directives?  Advance directives are legal documents that state your wishes and plans for medical care. These plans are made ahead of time in case you lose your ability to make decisions for yourself. Advance directives can apply to any medical decision, such as the treatments you want, and if you want to donate organs.   What are the types of advance directives?  There are many types of advance directives, and each state has rules about how to use them. You may choose a  combination of any of the following:  Living will:  This is a written record of the treatment you want. You can also choose which treatments you do not want, which to limit, and which to stop at a certain time. This includes surgery, medicine, IV fluid, and tube feedings.   Durable power of  for healthcare (DPAHC):  This is a written record that states who you want to make healthcare choices for you when you are unable to make them for yourself. This person, called a proxy, is usually a family member or a friend. You may choose more than 1 proxy.  Do not resuscitate (DNR) order:  A DNR order is used in case your heart stops beating or you stop breathing. It is a request not to have certain forms of treatment, such as CPR. A DNR order may be included in other types of advance directives.  Medical directive:  This covers the care that you want if you are in a coma, near death, or unable to make decisions for yourself. You can list the treatments you want for each condition. Treatment may include pain medicine, surgery, blood transfusions, dialysis, IV or tube feedings, and a ventilator (breathing machine).  Values history:  This document has questions about your views, beliefs, and how you feel and think about life. This information can help others choose the care that you would choose.  Why are advance directives important?  An advance directive helps you control your care. Although spoken wishes may be used, it is better to have your wishes written down. Spoken wishes can be misunderstood, or not followed. Treatments may be given even if you do not want them. An advance directive may make it easier for your family to make difficult choices about your care.   Weight Management   Why it is important to manage your weight:  Being overweight increases your risk of health conditions such as heart disease, high blood pressure, type 2 diabetes, and certain types of cancer. It can also increase your risk for  osteoarthritis, sleep apnea, and other respiratory problems. Aim for a slow, steady weight loss. Even a small amount of weight loss can lower your risk of health problems.  How to lose weight safely:  A safe and healthy way to lose weight is to eat fewer calories and get regular exercise. You can lose up about 1 pound a week by decreasing the number of calories you eat by 500 calories each day.   Healthy meal plan for weight management:  A healthy meal plan includes a variety of foods, contains fewer calories, and helps you stay healthy. A healthy meal plan includes the following:  Eat whole-grain foods more often.  A healthy meal plan should contain fiber. Fiber is the part of grains, fruits, and vegetables that is not broken down by your body. Whole-grain foods are healthy and provide extra fiber in your diet. Some examples of whole-grain foods are whole-wheat breads and pastas, oatmeal, brown rice, and bulgur.  Eat a variety of vegetables every day.  Include dark, leafy greens such as spinach, kale, james greens, and mustard greens. Eat yellow and orange vegetables such as carrots, sweet potatoes, and winter squash.   Eat a variety of fruits every day.  Choose fresh or canned fruit (canned in its own juice or light syrup) instead of juice. Fruit juice has very little or no fiber.  Eat low-fat dairy foods.  Drink fat-free (skim) milk or 1% milk. Eat fat-free yogurt and low-fat cottage cheese. Try low-fat cheeses such as mozzarella and other reduced-fat cheeses.  Choose meat and other protein foods that are low in fat.  Choose beans or other legumes such as split peas or lentils. Choose fish, skinless poultry (chicken or turkey), or lean cuts of red meat (beef or pork). Before you cook meat or poultry, cut off any visible fat.   Use less fat and oil.  Try baking foods instead of frying them. Add less fat, such as margarine, sour cream, regular salad dressing and mayonnaise to foods. Eat fewer high-fat foods. Some  examples of high-fat foods include french fries, doughnuts, ice cream, and cakes.  Eat fewer sweets.  Limit foods and drinks that are high in sugar. This includes candy, cookies, regular soda, and sweetened drinks.  Exercise:  Exercise at least 30 minutes per day on most days of the week. Some examples of exercise include walking, biking, dancing, and swimming. You can also fit in more physical activity by taking the stairs instead of the elevator or parking farther away from stores. Ask your healthcare provider about the best exercise plan for you.      © Copyright [employer] 2018 Information is for End User's use only and may not be sold, redistributed or otherwise used for commercial purposes. All illustrations and images included in CareNotes® are the copyrighted property of [employer].D[employer] Inc. or [employer]

## 2025-03-24 NOTE — PROGRESS NOTES
Name: Franklyn Hogan      : 1955      MRN: [id number]  Encounter Provider: Dylan Wilkins DO  Encounter Date: 3/24/2025   Encounter department: [facility]    Assessment & Plan  Medicare annual wellness visit, subsequent  Questionnaire reviewed.  Immunization health screening history updated.  Immunizations current.  Colon cancer screening current.  Routine lab work reviewed today.       Essential hypertension  Well-controlled on lisinopril 10 mg daily  Orders:  •  Comprehensive metabolic panel; Future  •  TSH, 3rd generation with Free T4 reflex; Future    Malignant neoplasm prostate (HCC)  PSA remains undetectable more than 6 years since initial treatment       Hyperlipidemia, unspecified hyperlipidemia type  Lipids stable HDL greater than 50 and LDL less than 130.  Continue Vytorin       Screening for deficiency anemia    Orders:  •  CBC and differential; Future    Peripheral nerve disorder  Good results with gabapentin.  Will continue  Orders:  •  gabapentin (Neurontin) 300 mg capsule; Take 1 capsule (300 mg total) by mouth daily at bedtime      Depression Screening and Follow-up Plan: Patient was screened for depression during today's encounter. They screened negative with a PHQ-2 score of 0.        Preventive health issues were discussed with patient, and age appropriate screening tests were ordered as noted in patient's After Visit Summary. Personalized health advice and appropriate referrals for health education or preventive services given if needed, as noted in patient's After Visit Summary.    History of Present Illness     Patient presents for annual wellness visit and follow-up of chronic medical problems.  He has a history of hypertension, hyperlipidemia, prostate cancer.  Overall he feels well.  He does have arthritis in his toes and had surgery on his left foot this past year which she is recovering well from.       Patient Care Team:  Dylan Wilkins DO as PCP -  General (Family Medicine)  MD Joseph Huang MD (Radiation Oncology)  Brian Duque MD (Urology)  Jorge Luis Sanchez MD as Endoscopist    Review of Systems   Constitutional: Negative.    Respiratory: Negative.     Cardiovascular: Negative.    Gastrointestinal: Negative.    Genitourinary: Negative.    Musculoskeletal: Negative.    Psychiatric/Behavioral: Negative.       Medical History Reviewed by provider this encounter:       Annual Wellness Visit Questionnaire   Franklyn is here for his Subsequent Wellness visit.     Health Risk Assessment:   Patient rates overall health as good. Patient feels that their physical health rating is same. Patient is satisfied with their life. Eyesight was rated as slightly better. Hearing was rated as same. Patient feels that their emotional and mental health rating is same. Patients states they are never, rarely angry. Patient states they are never, rarely unusually tired/fatigued. Pain experienced in the last 7 days has been some. Patient's pain rating has been 7/10. Patient states that he has experienced no weight loss or gain in last 6 months.     Depression Screening:   PHQ-2 Score: 0      Fall Risk Screening:   In the past year, patient has experienced: no history of falling in past year      Home Safety:  Patient does not have trouble with stairs inside or outside of their home. Patient has working smoke alarms and has working carbon monoxide detector. Home safety hazards include: none.     Nutrition:   Current diet is Regular.     Medications:   Patient is not currently taking any over-the-counter supplements. Patient is able to manage medications.     Activities of Daily Living (ADLs)/Instrumental Activities of Daily Living (IADLs):   Walk and transfer into and out of bed and chair?: Yes  Dress and groom yourself?: Yes    Bathe or shower yourself?: Yes    Feed yourself? Yes  Do your laundry/housekeeping?: Yes  Manage your money, pay your bills  and track your expenses?: Yes  Make your own meals?: Yes    Do your own shopping?: Yes    Previous Hospitalizations:   Any hospitalizations or ED visits within the last 12 months?: Yes    How many hospitalizations have you had in the last year?: 1-2    Advance Care Planning:   Living will: Yes    Advanced directive: Yes      Cognitive Screening:   Provider or family/friend/caregiver concerned regarding cognition?: No    PREVENTIVE SCREENINGS      Cardiovascular Screening:    General: Screening Not Indicated and History Lipid Disorder      Diabetes Screening:     General: Screening Current      Colorectal Cancer Screening:     General: Screening Current      Prostate Cancer Screening:    General: History Prostate Cancer      Osteoporosis Screening:    General: Screening Not Indicated      Abdominal Aortic Aneurysm (AAA) Screening:    Risk factors include: age between 65-76 yo and tobacco use        General: Screening Current      Lung Cancer Screening:     General: Screening Not Indicated      Hepatitis C Screening:    General: Screening Current    Screening, Brief Intervention, and Referral to Treatment (SBIRT)     Screening  Typical number of drinks in a day: 4  Typical number of drinks in a week: 22  Interpretation: Risky drinking behavior.    Single Item Drug Screening:  How often have you used an illegal drug (including marijuana) or a prescription medication for non-medical reasons in the past year? never    Single Item Drug Screen Score: 0  Interpretation: Negative screen for possible drug use disorder    Brief Intervention  Alcohol & drug use screenings were reviewed. No concerns regarding substance use disorder identified. Healthy alcohol use/limits discussed.     Annual Depression Screening  Time spent screening and evaluating the patient for depression during today's encounter was 5 minutes.    Social Drivers of Health     Food Insecurity: No Food Insecurity (3/24/2025)    Hunger Vital Sign    • Worried  "About Running Out of Food in the Last Year: Never true    • Ran Out of Food in the Last Year: Never true   Transportation Needs: No Transportation Needs (3/24/2025)    PRAPARE - Transportation    • Lack of Transportation (Medical): No    • Lack of Transportation (Non-Medical): No   Housing Stability: Low Risk  (3/24/2025)    Housing Stability Vital Sign    • Unable to Pay for Housing in the Last Year: No    • Number of Times Moved in the Last Year: 0    • Homeless in the Last Year: No   Utilities: Not At Risk (3/24/2025)    [facility] Utilities    • Threatened with loss of utilities: No     No results found.    Objective   /80   Pulse 77   Temp (!) 96.7 °F (35.9 °C)   Ht 6' 0.84\" (1.85 m)   Wt 90.3 kg (199 lb)   SpO2 97%   BMI 26.37 kg/m²     Physical Exam  Vitals and nursing note reviewed.   Constitutional:       General: He is not in acute distress.     Appearance: Normal appearance.   HENT:      Head: Normocephalic and atraumatic.   Eyes:      Pupils: Pupils are equal, round, and reactive to light.   Cardiovascular:      Rate and Rhythm: Normal rate and regular rhythm.      Pulses: Normal pulses.      Heart sounds: Normal heart sounds.   Pulmonary:      Effort: Pulmonary effort is normal.      Breath sounds: Normal breath sounds.   Musculoskeletal:         General: Normal range of motion.      Cervical back: Normal range of motion.   Skin:     General: Skin is warm and dry.   Neurological:      General: No focal deficit present.      Mental Status: He is alert and oriented to person, place, and time. Mental status is at baseline.   Psychiatric:         Mood and Affect: Mood normal.         Behavior: Behavior normal.         Thought Content: Thought content normal.         Judgment: Judgment normal.         "

## 2025-04-01 ENCOUNTER — OFFICE VISIT (OUTPATIENT)
Dept: PODIATRY | Facility: CLINIC | Age: 70
End: 2025-04-01
Payer: MEDICARE

## 2025-04-01 VITALS — HEIGHT: 72 IN | BODY MASS INDEX: 26.95 KG/M2 | WEIGHT: 199 LBS | RESPIRATION RATE: 18 BRPM

## 2025-04-01 DIAGNOSIS — M20.21 HALLUX RIGIDUS OF RIGHT FOOT: Primary | ICD-10-CM

## 2025-04-01 PROCEDURE — RECHECK: Performed by: PODIATRIST

## 2025-04-01 PROCEDURE — 20600 DRAIN/INJ JOINT/BURSA W/O US: CPT | Performed by: PODIATRIST

## 2025-04-01 RX ORDER — LIDOCAINE HYDROCHLORIDE 10 MG/ML
1 INJECTION, SOLUTION EPIDURAL; INFILTRATION; INTRACAUDAL; PERINEURAL ONCE
Status: COMPLETED | OUTPATIENT
Start: 2025-04-01 | End: 2025-04-01

## 2025-04-01 RX ORDER — LIDOCAINE HYDROCHLORIDE 10 MG/ML
1 INJECTION, SOLUTION INFILTRATION; PERINEURAL
Status: SHIPPED | OUTPATIENT
Start: 2025-04-01

## 2025-04-01 RX ORDER — TRIAMCINOLONE ACETONIDE 40 MG/ML
20 INJECTION, SUSPENSION INTRA-ARTICULAR; INTRAMUSCULAR
Status: SHIPPED | OUTPATIENT
Start: 2025-04-01

## 2025-04-01 RX ORDER — TRIAMCINOLONE ACETONIDE 40 MG/ML
20 INJECTION, SUSPENSION INTRA-ARTICULAR; INTRAMUSCULAR ONCE
Status: COMPLETED | OUTPATIENT
Start: 2025-04-01 | End: 2025-04-01

## 2025-04-01 RX ADMIN — LIDOCAINE HYDROCHLORIDE 1 ML: 10 INJECTION, SOLUTION INFILTRATION; PERINEURAL at 14:45

## 2025-04-01 RX ADMIN — TRIAMCINOLONE ACETONIDE 20 MG: 40 INJECTION, SUSPENSION INTRA-ARTICULAR; INTRAMUSCULAR at 14:45

## 2025-04-01 RX ADMIN — TRIAMCINOLONE ACETONIDE 20 MG: 40 INJECTION, SUSPENSION INTRA-ARTICULAR; INTRAMUSCULAR at 14:52

## 2025-04-01 RX ADMIN — LIDOCAINE HYDROCHLORIDE 1 ML: 10 INJECTION, SOLUTION EPIDURAL; INFILTRATION; INTRACAUDAL; PERINEURAL at 14:52

## 2025-04-01 NOTE — PROGRESS NOTES
Patient presents for pedal assessment.  Severe pain that had affected the right great toe joint recently disappeared within 1 day.  The patient took 1 tablet of Indocin and did not need any additional medication.  He still has severe right great toe joint pain due to osteoarthritis but symptoms are not severe as if he had gout.  Another cortisone injection is desired.    Patient having no discomfort with the great toe joint implant performed approximately 6 months ago.  He suspects that he desires a similar implant and neck September for the right great toe joint.  He will call to schedule.    For now, injected lateral aspect first MPJ right foot with 0.5 cc Kenalog 40 along with 1 cc 1% Xylocaine.  Patiently typically gains 6 months of relief with these type of cortisone injections.  He will contact our  in regards to the hallux rigidus correction right foot.    Small joint arthrocentesis: R great MTP  Universal Protocol:  procedure performed by consultantConsent: Verbal consent obtained.  Risks and benefits: risks, benefits and alternatives were discussed  Consent given by: patient  Patient understanding: patient states understanding of the procedure being performed  Patient identity confirmed: verbally with patient  Supporting Documentation  Indications: pain   Procedure Details  Location: great toe - R great MTP  Needle size: 25 G  Approach: dorsal  Medications administered: 1 mL lidocaine 1 %; 20 mg triamcinolone acetonide 40 mg/mL

## 2025-05-15 RX ORDER — GABAPENTIN 300 MG/1
300 CAPSULE ORAL DAILY
Qty: 30 CAPSULE | Refills: 1 | OUTPATIENT
Start: 2025-05-15

## 2025-05-23 DIAGNOSIS — I10 ESSENTIAL HYPERTENSION: ICD-10-CM

## 2025-05-23 RX ORDER — LISINOPRIL 5 MG/1
5 TABLET ORAL DAILY
Qty: 90 TABLET | Refills: 1 | Status: SHIPPED | OUTPATIENT
Start: 2025-05-23

## 2025-06-03 ENCOUNTER — OFFICE VISIT (OUTPATIENT)
Dept: FAMILY MEDICINE CLINIC | Facility: CLINIC | Age: 70
End: 2025-06-03
Payer: MEDICARE

## 2025-06-03 VITALS
HEIGHT: 73 IN | DIASTOLIC BLOOD PRESSURE: 80 MMHG | SYSTOLIC BLOOD PRESSURE: 126 MMHG | OXYGEN SATURATION: 97 % | BODY MASS INDEX: 25.58 KG/M2 | WEIGHT: 193 LBS | TEMPERATURE: 97.1 F | HEART RATE: 104 BPM

## 2025-06-03 DIAGNOSIS — R10.31 RIGHT LOWER QUADRANT ABDOMINAL PAIN: Primary | ICD-10-CM

## 2025-06-03 PROCEDURE — 99213 OFFICE O/P EST LOW 20 MIN: CPT | Performed by: FAMILY MEDICINE

## 2025-06-03 PROCEDURE — G2211 COMPLEX E/M VISIT ADD ON: HCPCS | Performed by: FAMILY MEDICINE

## 2025-06-03 NOTE — PROGRESS NOTES
"Name: Franklyn Hogan      : 1955      MRN: [id number]  Encounter Provider: Dylan Wilkins DO  Encounter Date: 6/3/2025   Encounter department: [facility]    Assessment & Plan  Right lower quadrant abdominal pain  Possible right lower quadrant ventral hernia versus inguinal hernia.  Unclear on exam.  Will refer to surgery for second opinion.  Orders:  •  Ambulatory Referral to General Surgery; Future         History of Present Illness     Patient was seen for chief complaint of right lower abdominal pain.  Symptoms have been present approximately 2 months.  States that it feels like hernia pain that he had previously on the left side.  He had his hernia repair surgery in 2020.      Review of Systems   Gastrointestinal:  Positive for abdominal pain.     Past Medical History[1]  Past Surgical History[2]  Family History[3]  Social History[4]  Medications[5]  Allergies   Allergen Reactions   • Lactose - Food Allergy GI Intolerance     Immunization History   Administered Date(s) Administered   • COVID-19 PFIZER VACCINE 0.3 ML IM 2021, 2021, 2022, 2022, 2023   • COVID-19 Pfizer Vac BIVALENT Jamar-sucrose 12 Yr+ IM 2022   • COVID-19 Pfizer mRNA vacc PF jamar-sucrose 12 yr and older (Comirnaty) 10/24/2024   • H1N1, All Formulations 2009   • INFLUENZA 10/03/2016, 10/03/2022, 10/10/2024   • Influenza Quadrivalent, 6-35 Months IM 10/03/2016, 2017   • Influenza, high dose seasonal 0.7 mL 2023   • Influenza, recombinant, quadrivalent,injectable, preservative free 10/29/2018, 2019, 2020, 10/26/2021   • Influenza, seasonal, injectable 2011, 2012, 2014, 2015   • Pneumococcal Conjugate Vaccine 20-valent (Pcv20), Polysace 10/25/2022   • Tdap 10/25/2022   • Zoster 2017   • Zoster Vaccine Recombinant 2020, 2020     Objective   /80   Pulse 104   Temp (!) 97.1 °F (36.2 °C)   Ht 6' 1\" (1.854 " "m)   Wt 87.5 kg (193 lb)   SpO2 97%   BMI 25.46 kg/m²     Physical Exam  Abdominal:      Tenderness: There is abdominal tenderness in the right lower quadrant.      Hernia: A hernia is present. Hernia is present in the right inguinal area (Questionable bulge right lower quadrant above inguinal canal).                [1]  Past Medical History:  Diagnosis Date   • Abnormal cardiovascular stress test     last assessed-11/13/2015   • Abnormal electrocardiogram     last assessed-11/2/2015   • Acute nonintractable headache 07/21/2021   • Arthritis    • 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   • Cancer (HCC)     prostate   • COVID-19 03/23/2021   • Ear problems 10 weeks    ringing (constant)   • Elevated PSA    • Encounter for screening colonoscopy for non-high-risk patient     family hx possible of colon ca   • Environmental and seasonal allergies    • Hallux rigidus     last assessed-4/24/2014   • History of prostate cancer 12/03/2020   • Hyperlipidemia    • Hypertension    • Ingrown toenail    • Irregular heart beat     \"rapid heart beat episode X1\" - tested - no issue   • Organic impotence     last assessed-4/24/2014   • Otitis media 12/13/2019   • Rapid pulse     resolved-1/19/2016   • Tachycardia     last assessed-11/2/2015   • Wears glasses    [2]  Past Surgical History:  Procedure Laterality Date   • APPENDECTOMY     • BACK SURGERY      L4-L5   • COLONOSCOPY      X 3-4    • HERNIA REPAIR     • OTHER SURGICAL HISTORY      spinal diskectomy   • [place] COLONOSCOPY FLX DX W/COLLJ SPEC WHEN PFRMD N/A 9/30/2016    Procedure: COLONOSCOPY;  Surgeon: Jorge Luis Sanchez MD;  [facility];  Service: Colorectal   • NV HALLUX RIGIDUS W/CHEILECTOMY 1ST MP JT W/IMPLT Left 10/11/2024    Procedure: CHEILECTOMY W IMPLANT;  Surgeon: Randall Blanchard DPM;  [facility];  Service: Podiatry   • NV LAPAROSCOPIC APPENDECTOMY N/A 12/21/2019    Procedure: APPENDECTOMY LAPAROSCOPIC;  Surgeon: Brian Abbott MD;  " [facility];  Service: General   • TX LAPS SURG [id number] RPBIC RAD W/NRV SPARING ROBOT N/A 10/29/2018    Procedure: PROSTATECTOMY RADICAL W ROBOTICS; BILATERAL PELVIC LYMPH NODE DISSECTION; LYSIS OF ADHESIONS;  Surgeon: Matthew Garcia MD;  [facility];  Service: Urology   • TX RPR 1ST INGUN HRNA AGE 5 YRS/> REDUCIBLE Left 1/10/2020    Procedure: LAPAROSCOPIC INGUINAL HERNIA REPAIR WITH MESH;  Surgeon: Brian Abbott MD;  [facility];  Service: General   • PROSTATE BIOPSY Bilateral 2018   • REDUCTION OF TORSION OF TESTIS     • VASECTOMY     • WISDOM TOOTH EXTRACTION     [3]  Family History  Problem Relation Name Age of Onset   • Cancer Mother Ju Hogan    • Hepatitis Father Dominick Hogan         Hepatitis C   • Dementia Father Dominick Hogan    • Asthma Father Dominick Hogan    [4]  Social History  Tobacco Use   • Smoking status: Former     Current packs/day: 0.00     Average packs/day: 0.5 packs/day for 9.0 years (4.5 ttl pk-yrs)     Types: Cigarettes     Start date: 1976     Quit date:      Years since quittin.1   • Smokeless tobacco: Never   Vaping Use   • Vaping status: Never Used   Substance and Sexual Activity   • Alcohol use: Yes     Alcohol/week: 21.0 - 28.0 standard drinks of alcohol     Types: 21 - 28 Standard drinks or equivalent per week     Comment: yesterday    • Drug use: No   • Sexual activity: Not Currently     Partners: Female     Birth control/protection: Male Sterilization   [5]  Current Outpatient Medications on File Prior to Visit   Medication Sig   • ezetimibe-simvastatin (VYTORIN) 10-10 mg per tablet TAKE 1 TABLET BY MOUTH EVERY DAY   • lisinopril (ZESTRIL) 5 mg tablet TAKE 1 TABLET BY MOUTH EVERY DAY   • Multiple Vitamin (multivitamin) tablet Take 1 tablet by mouth in the morning.   • Cholecalciferol (VITAMIN D3) 1,000 units tablet Take 1,000 Units by mouth daily (Patient not taking: Reported on 3/24/2025)   •  clotrimazole-betamethasone (LOTRISONE) 1-0.05 % cream Apply topically 2 (two) times a day (Patient not taking: Reported on 6/3/2025)   • gabapentin (NEURONTIN) 300 mg capsule Take 1 capsule (300 mg total) by mouth daily (Patient not taking: Reported on 6/3/2025)   • gabapentin (Neurontin) 300 mg capsule Take 1 capsule (300 mg total) by mouth daily at bedtime (Patient not taking: Reported on 6/3/2025)   • hydrocortisone (ANUSOL-HC) 2.5 % rectal cream Apply topically 2 (two) times a day (Patient not taking: Reported on 6/21/2023)   • ibuprofen (MOTRIN) 600 mg tablet Take 1 tablet (600 mg total) by mouth 4 (four) times daily (after meals and at bedtime)   • indomethacin (INDOCIN SR) 75 mg CR capsule Take 1 capsule (75 mg total) by mouth 2 (two) times a day with meals for 10 days (Patient not taking: Reported on 3/24/2025)   • meloxicam (MOBIC) 15 mg tablet Take 1 tablet (15 mg total) by mouth daily

## 2025-06-05 PROBLEM — R82.2 BILIRUBIN IN URINE: Status: RESOLVED | Noted: 2021-07-17 | Resolved: 2025-06-05

## 2025-06-05 PROBLEM — M20.22 HALLUX RIGIDUS OF LEFT FOOT: Status: RESOLVED | Noted: 2024-10-11 | Resolved: 2025-06-05

## 2025-06-05 PROBLEM — B37.0 THRUSH, ORAL: Status: RESOLVED | Noted: 2023-12-05 | Resolved: 2025-06-05

## 2025-06-05 NOTE — PROGRESS NOTES
Name: Franklyn Hogan      : 1955      MRN: [id number]  Encounter Provider: Jeannine Ricks PA-C  Encounter Date: 6/10/2025   Encounter department: [facility] GENERAL SURGERY [place]  :  Assessment & Plan  Right inguinal hernia  Possible small right inguinal hernia on examination. Update CT to see if hernia and will discuss robotic vs laparoscopic approach. Extensive dense adhesions with LIH repair from prior robotic prostatectomy. Also right hip pain possibility for pain from HPI standpoint. If CT negative can possible order right hip x-ray. Patient will f/u with dr. Abbott after CT for surgical evaluation.   Orders:    CT abdomen pelvis without contrast; Future    History of prostate cancer  History of robotic prostatectomy            History of Present Illness   HPI  Franklyn Hogan is a 69 y.o. male who presents for right groin pain Since May 14th. He was doing no specific activity when the pain started but the pain occurs during walking the most after about ten minutes he gets right groin pain that feels sharp. There is no bulging, constipation, nausea or vomiting. No increased pain with lifting or coughing. PCP thought he felt a possible small hernia with valsalva. Patient did have extensive dense scarring noted during his laparoscopic inguinal hernia repair in  with Dr. Abbott. Patient is very active and this does cause enough discomfort while walking to make him stop.       Past surgical history includes laparoscopic appendectomy and left inguinal hernia repair both with Dr. Abbott. Also prior robotic prostatectomy.     Review of Systems   Constitutional:  Negative for chills, diaphoresis, fever and unexpected weight change.   Respiratory:  Negative for chest tightness and shortness of breath.    Cardiovascular:  Negative for chest pain, palpitations and leg swelling.   Gastrointestinal:  Positive for abdominal pain. Negative for anal bleeding, blood in stool, constipation, diarrhea,  "nausea, rectal pain and vomiting.   Genitourinary:  Negative for difficulty urinating and frequency.   Skin:  Negative for color change, rash and wound.   Neurological:  Negative for weakness and numbness.   Hematological:  Does not bruise/bleed easily.   Psychiatric/Behavioral:  Negative for confusion. The patient is not nervous/anxious.           Objective   /86 (BP Location: Left arm, Patient Position: Sitting, Cuff Size: Large)   Pulse 92   Temp (!) 97.1 °F (36.2 °C) (Tympanic)   Resp 16   Ht 6' 1\" (1.854 m)   Wt 88.4 kg (194 lb 12.8 oz)   SpO2 98%   BMI 25.70 kg/m²      Physical Exam  Vitals and nursing note reviewed.   Constitutional:       General: He is not in acute distress.     Appearance: He is well-developed.   HENT:      Head: Normocephalic and atraumatic.     Eyes:      Conjunctiva/sclera: Conjunctivae normal.       Cardiovascular:      Rate and Rhythm: Normal rate and regular rhythm.      Heart sounds: No murmur heard.  Pulmonary:      Effort: Pulmonary effort is normal. No respiratory distress.      Breath sounds: Normal breath sounds.   Abdominal:      Palpations: Abdomen is soft.      Tenderness: There is no abdominal tenderness.      Hernia: A hernia is present. Hernia is present in the right inguinal area.      Comments: Possible small right inguinal hernia with valsalva.      Musculoskeletal:         General: No swelling.      Cervical back: Neck supple.     Skin:     General: Skin is warm and dry.      Capillary Refill: Capillary refill takes less than 2 seconds.     Neurological:      Mental Status: He is alert.     Psychiatric:         Mood and Affect: Mood normal.           "

## 2025-06-10 ENCOUNTER — CONSULT (OUTPATIENT)
Dept: SURGERY | Facility: CLINIC | Age: 70
End: 2025-06-10
Payer: MEDICARE

## 2025-06-10 VITALS
DIASTOLIC BLOOD PRESSURE: 86 MMHG | WEIGHT: 194.8 LBS | HEART RATE: 92 BPM | RESPIRATION RATE: 16 BRPM | BODY MASS INDEX: 25.82 KG/M2 | HEIGHT: 73 IN | OXYGEN SATURATION: 98 % | SYSTOLIC BLOOD PRESSURE: 148 MMHG | TEMPERATURE: 97.1 F

## 2025-06-10 DIAGNOSIS — K40.90 RIGHT INGUINAL HERNIA: Primary | ICD-10-CM

## 2025-06-10 DIAGNOSIS — Z85.46 HISTORY OF PROSTATE CANCER: ICD-10-CM

## 2025-06-10 PROBLEM — Z98.890 POST-OPERATIVE STATE: Status: RESOLVED | Noted: 2024-10-11 | Resolved: 2025-06-10

## 2025-06-10 PROCEDURE — 99203 OFFICE O/P NEW LOW 30 MIN: CPT | Performed by: PHYSICIAN ASSISTANT

## 2025-06-17 ENCOUNTER — HOSPITAL ENCOUNTER (OUTPATIENT)
Dept: CT IMAGING | Facility: HOSPITAL | Age: 70
Discharge: HOME | End: 2025-06-17
Attending: PHYSICIAN ASSISTANT
Payer: MEDICARE

## 2025-06-17 DIAGNOSIS — K40.90 RIGHT INGUINAL HERNIA: ICD-10-CM

## 2025-06-17 PROCEDURE — 74176 CT ABD & PELVIS W/O CONTRAST: CPT

## 2025-06-19 ENCOUNTER — RESULTS FOLLOW-UP (OUTPATIENT)
Dept: SURGERY | Facility: CLINIC | Age: 70
End: 2025-06-19

## 2025-06-19 DIAGNOSIS — R10.31 RIGHT GROIN PAIN: Primary | ICD-10-CM

## 2025-06-20 ENCOUNTER — APPOINTMENT (OUTPATIENT)
Dept: RADIOLOGY | Facility: CLINIC | Age: 70
End: 2025-06-20
Payer: MEDICARE

## 2025-06-20 DIAGNOSIS — R10.31 RIGHT GROIN PAIN: ICD-10-CM

## 2025-06-20 PROCEDURE — 73502 X-RAY EXAM HIP UNI 2-3 VIEWS: CPT

## 2025-06-20 NOTE — TELEPHONE ENCOUNTER
----- Message from Summer SHEIKH sent at 6/19/2025  3:41 PM EDT -----    ----- Message -----  From: Jeannine Ricks PA-C  Sent: 6/19/2025   1:25 PM EDT  To: General Surgery [facility]    Please let him know CT showed no evidence of inguinal hernia. I did order a hip x ray for him to complete to ensure it is not coming from his hip.   ----- Message -----  From: Jacqueline, Radiology Results In  Sent: 6/19/2025  12:22 PM EDT  To: Jeannine Ricks PA-C

## 2025-06-20 NOTE — TELEPHONE ENCOUNTER
Spoke with patient advised him of his CT results. He is to get the Xray of the hip prior to his appointment on 06/25/25 with .

## 2025-06-24 ENCOUNTER — TELEPHONE (OUTPATIENT)
Dept: SURGERY | Facility: CLINIC | Age: 70
End: 2025-06-24

## 2025-06-24 NOTE — TELEPHONE ENCOUNTER
Left voicemail to advise patient he should keep his appointment to see Dr Abbott for a evaluation of symptoms. Results should be back by tomorrow.

## 2025-06-25 ENCOUNTER — OFFICE VISIT (OUTPATIENT)
Dept: SURGERY | Facility: CLINIC | Age: 70
End: 2025-06-25
Payer: MEDICARE

## 2025-06-25 VITALS
TEMPERATURE: 97.3 F | OXYGEN SATURATION: 97 % | DIASTOLIC BLOOD PRESSURE: 78 MMHG | HEIGHT: 73 IN | HEART RATE: 67 BPM | SYSTOLIC BLOOD PRESSURE: 126 MMHG | WEIGHT: 194 LBS | RESPIRATION RATE: 16 BRPM | BODY MASS INDEX: 25.71 KG/M2

## 2025-06-25 DIAGNOSIS — K40.90 RIGHT INGUINAL HERNIA: Primary | ICD-10-CM

## 2025-06-25 PROCEDURE — 99213 OFFICE O/P EST LOW 20 MIN: CPT | Performed by: SURGERY

## 2025-06-25 NOTE — ASSESSMENT & PLAN NOTE
I reviewed his CT scan and also looked at previous CT scans going back even to 2019 before his left hernia was fixed.  The CT scan does not read as a right inguinal hernia but this can be common with small hernias.  Even his CT scan before the left inguinal hernia was fixed did not show an obvious hernia on imaging.  He is tender in the right groin at the external ring.  Worse with motion and coughing.  I believe I's feel some possible fat-containing hernia in this area.  At this point we will plan with conservative management for the next 6 weeks see how he manages see if things settle down with anti-inflammatories and time.  I will see him back in 6 weeks and at that point if still having symptoms can plan for a right inguinal hernia repair either laparoscopic or robotic.  In addition if he feels that symptoms are worsening or notices any change she should call for reevaluation and scheduling.

## 2025-06-25 NOTE — PROGRESS NOTES
Name: Franklyn Hogan      : 1955      MRN: [id number]  Encounter Provider: Brian Abbott MD  Encounter Date: 2025   Encounter department: [facility] SURGERY [place]  :  Assessment & Plan  Right inguinal hernia  I reviewed his CT scan and also looked at previous CT scans going back even to 2019 before his left hernia was fixed.  The CT scan does not read as a right inguinal hernia but this can be common with small hernias.  Even his CT scan before the left inguinal hernia was fixed did not show an obvious hernia on imaging.  He is tender in the right groin at the external ring.  Worse with motion and coughing.  I believe I's feel some possible fat-containing hernia in this area.  At this point we will plan with conservative management for the next 6 weeks see how he manages see if things settle down with anti-inflammatories and time.  I will see him back in 6 weeks and at that point if still having symptoms can plan for a right inguinal hernia repair either laparoscopic or robotic.  In addition if he feels that symptoms are worsening or notices any change she should call for reevaluation and scheduling.             History of Present Illness   Franklyn Hogan is a 69 y.o. male who presents for evaluation of right groin pain.  He has a history of a prostatectomy and an appendectomy and then a laparoscopic left inguinal hernia repair.  Last surgery was .  Started having right groin pain about 6 weeks ago.  Worse with activity worse with straining.  He has to stop his activities at times.  He points to the right groin.  HPI  Review of Systems   Constitutional:  Negative for appetite change, chills and fever.   HENT:  Negative for congestion and ear pain.    Eyes:  Negative for discharge and itching.   Respiratory:  Negative for chest tightness and shortness of breath.    Cardiovascular:  Negative for chest pain and palpitations.   Gastrointestinal:  Positive for abdominal pain. Negative for  "abdominal distention.   Musculoskeletal:  Negative for arthralgias and gait problem.   Skin:  Negative for color change and rash.   Neurological:  Negative for dizziness and numbness.   Psychiatric/Behavioral:  Negative for agitation and confusion.     as per HPI.  Past Medical History   Past Medical History[1]  Past Surgical History[2]  Family History[3]   reports that he quit smoking about 40 years ago. His smoking use included cigarettes. He started smoking about 49 years ago. He has a 4.5 pack-year smoking history. He has never used smokeless tobacco. He reports current alcohol use of about 21.0 - 28.0 standard drinks of alcohol per week. He reports that he does not use drugs.  Current Outpatient Medications   Medication Instructions    ezetimibe-simvastatin (VYTORIN) 10-10 mg per tablet 1 tablet, Oral, Daily    lisinopril (ZESTRIL) 5 mg, Oral, Daily    Multiple Vitamin (multivitamin) tablet 1 tablet, Daily   Allergies[4]   Medications Ordered Prior to Encounter[5]   Social History[6]     Objective   /78 (BP Location: Left arm, Patient Position: Sitting, Cuff Size: Large)   Pulse 67   Temp (!) 97.3 °F (36.3 °C) (Tympanic)   Resp 16   Ht 6' 1\" (1.854 m)   Wt 88 kg (194 lb)   SpO2 97%   BMI 25.60 kg/m²      Physical Exam  Vitals and nursing note reviewed.   Constitutional:       General: He is not in acute distress.     Appearance: He is well-developed. He is not diaphoretic.   HENT:      Head: Normocephalic and atraumatic.     Eyes:      Pupils: Pupils are equal, round, and reactive to light.       Cardiovascular:      Rate and Rhythm: Normal rate and regular rhythm.   Pulmonary:      Effort: Pulmonary effort is normal. No respiratory distress.   Abdominal:      General: Bowel sounds are normal.      Palpations: Abdomen is soft.      Comments: Very tender at external ring possibly small fat-containing hernia.     Musculoskeletal:         General: Normal range of motion.      Cervical back: Normal " "range of motion and neck supple.     Skin:     General: Skin is warm and dry.     Neurological:      Mental Status: He is alert and oriented to person, place, and time.     Psychiatric:         Behavior: Behavior normal.                      [1]   Past Medical History:  Diagnosis Date    Abnormal cardiovascular stress test     last assessed-11/13/2015    Abnormal electrocardiogram     last assessed-11/2/2015    Acute nonintractable headache 07/21/2021    Arthritis     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    Cancer (HCC)     prostate    COVID-19 03/23/2021    Ear problems 10 weeks    ringing (constant)    Elevated PSA     Encounter for screening colonoscopy for non-high-risk patient     family hx possible of colon ca    Environmental and seasonal allergies     Hallux rigidus     last assessed-4/24/2014    History of prostate cancer 12/03/2020    Hyperlipidemia     Hypertension     Ingrown toenail     Irregular heart beat     \"rapid heart beat episode X1\" - tested - no issue    Organic impotence     last assessed-4/24/2014    Otitis media 12/13/2019    Rapid pulse     resolved-1/19/2016    Tachycardia     last assessed-11/2/2015    Wears glasses    [2]   Past Surgical History:  Procedure Laterality Date    APPENDECTOMY      BACK SURGERY      L4-L5    COLONOSCOPY      X 3-4     HERNIA REPAIR      OTHER SURGICAL HISTORY      spinal diskectomy    [place] COLONOSCOPY FLX DX W/COLLJ SPEC WHEN PFRMD N/A 9/30/2016    Procedure: COLONOSCOPY;  Surgeon: Jorge Luis Sanchez MD;  [facility];  Service: Colorectal    NY HALLUX RIGIDUS W/CHEILECTOMY 1ST MP JT W/IMPLT Left 10/11/2024    Procedure: CHEILECTOMY W IMPLANT;  Surgeon: Randall Blanchard DPM;  [facility];  Service: Podiatry    NY LAPAROSCOPIC APPENDECTOMY N/A 12/21/2019    Procedure: APPENDECTOMY LAPAROSCOPIC;  Surgeon: Brian Abbott MD;  [facility];  Service: General    [place] LAPS SURG [id number] RPBIC RAD W/NRV SPARING ROBOT N/A " 10/29/2018    Procedure: PROSTATECTOMY RADICAL W ROBOTICS; BILATERAL PELVIC LYMPH NODE DISSECTION; LYSIS OF ADHESIONS;  Surgeon: Matthew Garcia MD;  [facility];  Service: Urology    [place] RPR 1ST INGUN HRNA AGE 5 YRS/> REDUCIBLE Left 1/10/2020    Procedure: LAPAROSCOPIC INGUINAL HERNIA REPAIR WITH MESH;  Surgeon: Brian Abbott MD;  [facility];  Service: General    PROSTATE BIOPSY Bilateral 08/2018    REDUCTION OF TORSION OF TESTIS  2001    VASECTOMY      WISDOM TOOTH EXTRACTION     [3]   Family History  Problem Relation Name Age of Onset    Cancer Mother Ju Hogan     Hepatitis Father Dominick Hogan         Hepatitis C    Dementia Father Dominick Hogan     Asthma Father Dominick Hogan    [4]   Allergies  Allergen Reactions    Lactose - Food Allergy GI Intolerance   [5]   Current Outpatient Medications on File Prior to Visit   Medication Sig Dispense Refill    ezetimibe-simvastatin (VYTORIN) 10-10 mg per tablet TAKE 1 TABLET BY MOUTH EVERY DAY 90 tablet 1    lisinopril (ZESTRIL) 5 mg tablet TAKE 1 TABLET BY MOUTH EVERY DAY 90 tablet 1    Multiple Vitamin (multivitamin) tablet Take 1 tablet by mouth in the morning.       Current Facility-Administered Medications on File Prior to Visit   Medication Dose Route Frequency Provider Last Rate Last Admin    lidocaine (XYLOCAINE) 1 % injection 1 mL  1 mL Injection  Randall Blanchard DPM   1 mL at 11/29/23 0915    lidocaine (XYLOCAINE) 1 % injection 1 mL  1 mL Injection  Randall Blanchard DPM   1 mL at 11/29/23 0915    lidocaine (XYLOCAINE) 1 % injection 1 mL  1 mL Injection     1 mL at 05/15/24 1400    lidocaine (XYLOCAINE) 1 % injection 1 mL  1 mL Injection     1 mL at 05/15/24 1400    lidocaine (XYLOCAINE) 1 % injection 1 mL  1 mL Injection     1 mL at 04/01/25 1445    triamcinolone acetonide (KENALOG-40) 40 mg/mL injection 20 mg  20 mg Intra-articular  Randall Blanchard DPM   20 mg at 11/29/23 0915    triamcinolone acetonide (KENALOG-40) 40 mg/mL  injection 20 mg  20 mg Infiltration  Randall Blanchard DPM   20 mg at 23 0915    triamcinolone acetonide (KENALOG-40) 40 mg/mL injection 20 mg  20 mg Infiltration     20 mg at 05/15/24 1400    triamcinolone acetonide (KENALOG-40) 40 mg/mL injection 20 mg  20 mg Infiltration     20 mg at 05/15/24 1400    triamcinolone acetonide (Kenalog-40) 40 mg/mL injection 20 mg  20 mg Infiltration     20 mg at 25 1445   [6]   Social History  Tobacco Use    Smoking status: Former     Current packs/day: 0.00     Average packs/day: 0.5 packs/day for 9.0 years (4.5 ttl pk-yrs)     Types: Cigarettes     Start date: 1976     Quit date:      Years since quittin.1    Smokeless tobacco: Never   Vaping Use    Vaping status: Never Used   Substance and Sexual Activity    Alcohol use: Yes     Alcohol/week: 21.0 - 28.0 standard drinks of alcohol     Types: 21 - 28 Standard drinks or equivalent per week     Comment: yesterday 1600    Drug use: No    Sexual activity: Not Currently     Partners: Female     Birth control/protection: Male Sterilization

## 2025-06-26 ENCOUNTER — RESULTS FOLLOW-UP (OUTPATIENT)
Dept: OTHER | Facility: HOSPITAL | Age: 70
End: 2025-06-26

## 2025-06-26 NOTE — TELEPHONE ENCOUNTER
----- Message from Jeannine Ricks PA-C sent at 6/26/2025  7:41 AM EDT -----  Normal hip x-ray  ----- Message -----  From: Interface, Radiology Results In  Sent: 6/25/2025   7:15 PM EDT  To: Jeannine Ricks PA-C

## 2025-07-30 ENCOUNTER — TELEPHONE (OUTPATIENT)
Age: 70
End: 2025-07-30

## 2025-08-06 ENCOUNTER — OFFICE VISIT (OUTPATIENT)
Dept: SURGERY | Facility: CLINIC | Age: 70
End: 2025-08-06
Payer: MEDICARE

## 2025-08-06 VITALS
SYSTOLIC BLOOD PRESSURE: 128 MMHG | BODY MASS INDEX: 25.98 KG/M2 | HEART RATE: 70 BPM | WEIGHT: 196 LBS | OXYGEN SATURATION: 99 % | TEMPERATURE: 96.6 F | DIASTOLIC BLOOD PRESSURE: 78 MMHG | HEIGHT: 73 IN | RESPIRATION RATE: 16 BRPM

## 2025-08-06 DIAGNOSIS — K40.90 RIGHT INGUINAL HERNIA: Primary | ICD-10-CM

## 2025-08-06 PROCEDURE — 99213 OFFICE O/P EST LOW 20 MIN: CPT | Performed by: SURGERY

## 2025-08-06 RX ORDER — SODIUM CHLORIDE, SODIUM LACTATE, POTASSIUM CHLORIDE, CALCIUM CHLORIDE 600; 310; 30; 20 MG/100ML; MG/100ML; MG/100ML; MG/100ML
125 INJECTION, SOLUTION INTRAVENOUS CONTINUOUS
Status: CANCELLED | OUTPATIENT
Start: 2025-08-11

## 2025-08-06 RX ORDER — CEFAZOLIN SODIUM 2 G/50ML
2000 SOLUTION INTRAVENOUS ONCE
Status: CANCELLED | OUTPATIENT
Start: 2025-08-11 | End: 2025-08-06

## 2025-08-07 ENCOUNTER — ANESTHESIA EVENT (OUTPATIENT)
Dept: PERIOP | Facility: HOSPITAL | Age: 70
End: 2025-08-07
Payer: MEDICARE

## 2025-08-11 ENCOUNTER — HOSPITAL ENCOUNTER (OUTPATIENT)
Facility: HOSPITAL | Age: 70
Setting detail: OUTPATIENT SURGERY
Discharge: HOME | End: 2025-08-11
Attending: SURGERY | Admitting: SURGERY
Payer: MEDICARE

## 2025-08-11 ENCOUNTER — ANESTHESIA (OUTPATIENT)
Dept: PERIOP | Facility: HOSPITAL | Age: 70
End: 2025-08-11
Payer: MEDICARE

## (undated) DEVICE — PMI DISPOSABLE PUNCTURE CLOSURE DEVICE / SUTURE GRASPER: Brand: PMI

## (undated) DEVICE — 3000CC GUARDIAN II: Brand: GUARDIAN

## (undated) DEVICE — ADHESIVE SKIN HIGH VISCOSITY EXOFIN 1ML

## (undated) DEVICE — NEEDLE 18 G X 1 1/2

## (undated) DEVICE — POV-IOD SOLUTION 4OZ BT

## (undated) DEVICE — ADHESIVE SKN CLSR HISTOACRYL FLEX 0.5ML LF

## (undated) DEVICE — LUBRICANT INST ELECTROLUBE ANTISTK WO PAD

## (undated) DEVICE — AIRSEAL TUBE SMOKE EVAC LUMENX3 FILTERED

## (undated) DEVICE — JACKSON-PRATT 100CC BULB RESERVOIR: Brand: CARDINAL HEALTH

## (undated) DEVICE — SCD SEQUENTIAL COMPRESSION COMFORT SLEEVE MEDIUM KNEE LENGTH: Brand: KENDALL SCD

## (undated) DEVICE — CHLORAPREP HI-LITE 26ML ORANGE

## (undated) DEVICE — IRRIG ENDO FLO TUBING

## (undated) DEVICE — SUT ETHILON 2-0 FS 18 IN 664H

## (undated) DEVICE — TROCAR: Brand: KII FIOS FIRST ENTRY

## (undated) DEVICE — ENSEAL LAPAROSCOPIC TISSUE SEALER G2 CURVED JAW FOR USE WITH G2 GENERATOR 5MM DIAMETER 35CM SHAFT LENGTH: Brand: ENSEAL

## (undated) DEVICE — ENDOPATH ETS-FLEX45 ARTICULATING ENDOSCOPIC LINEAR CUTTER, NO RELOAD: Brand: ENDOPATH

## (undated) DEVICE — ARM DRAPE

## (undated) DEVICE — SUT MONOCRYL 3-0 RB-1 27 IN Y305H

## (undated) DEVICE — GLOVE SRG BIOGEL ECLIPSE 7.5

## (undated) DEVICE — 5 MM CURVED DISSECTORS WITH MONOPOLAR CAUTERY: Brand: ENDOPATH

## (undated) DEVICE — STOCKINETTE REGULAR

## (undated) DEVICE — SUT MONOCRYL 4-0 PS-2 27 IN Y426H

## (undated) DEVICE — ETS45 RELOAD STANDARD 45MM: Brand: ENDOPATH

## (undated) DEVICE — 10FR FRAZIER SUCTION HANDLE: Brand: CARDINAL HEALTH

## (undated) DEVICE — DISSECTOR BALLOON SPACEMAKER PRO BTT/OVAL

## (undated) DEVICE — NEEDLE 25G X 1 1/2

## (undated) DEVICE — JP PERF DRN SIL FLT 10MM FULL: Brand: CARDINAL HEALTH

## (undated) DEVICE — CAST PADDING 4 IN SYNTHETIC NON-STRL

## (undated) DEVICE — SUT VICRYL 0 UR-6 27 IN J603H

## (undated) DEVICE — DRAPE EQUIPMENT RF WAND

## (undated) DEVICE — SYRINGE 5ML LL

## (undated) DEVICE — GLOVE INDICATOR PI UNDERGLOVE SZ 8.5 BLUE

## (undated) DEVICE — SINGLE PORT MANIFOLD: Brand: NEPTUNE 2

## (undated) DEVICE — 2000CC GUARDIAN II: Brand: GUARDIAN

## (undated) DEVICE — TROCAR PORT ACCESS 12 X120MM W/BLDLS OPTICAL TIP AIRSEAL

## (undated) DEVICE — SUT VICRYL 4-0 PS-2 27 IN J426H

## (undated) DEVICE — PACK ROBOTIC PROSTATE PBDS DA VINCI SI/XI

## (undated) DEVICE — ENDOPATH 5MM CURVED SCISSORS WITH MONOPOLAR CAUTERY: Brand: ENDOPATH

## (undated) DEVICE — GLOVE INDICATOR PI UNDERGLOVE SZ 8 BLUE

## (undated) DEVICE — COBRA GRASPER: Brand: ENDOWRIST

## (undated) DEVICE — [HIGH FLOW INSUFFLATOR,  DO NOT USE IF PACKAGE IS DAMAGED,  KEEP DRY,  KEEP AWAY FROM SUNLIGHT,  PROTECT FROM HEAT AND RADIOACTIVE SOURCES.]: Brand: PNEUMOSURE

## (undated) DEVICE — DRAPE SHEET X-LG

## (undated) DEVICE — DRAIN SPONGES,6 PLY: Brand: EXCILON

## (undated) DEVICE — PERMANENT CAUTERY HOOK: Brand: ENDOWRIST

## (undated) DEVICE — CUFF TOURNIQUET 18 X 4 IN QUICK CONNECT DISP 1 BLADDER

## (undated) DEVICE — GLOVE SRG BIOGEL 8.5

## (undated) DEVICE — BLADELESS OBTURATOR: Brand: WECK VISTA

## (undated) DEVICE — SUT MONOCRYL 3-0 RB-1 27 IN Y215H

## (undated) DEVICE — INTENDED FOR TISSUE SEPARATION, AND OTHER PROCEDURES THAT REQUIRE A SHARP SURGICAL BLADE TO PUNCTURE OR CUT.: Brand: BARD-PARKER ® CARBON RIB-BACK BLADES

## (undated) DEVICE — COLUMN DRAPE

## (undated) DEVICE — SUT SILK 2-0 SH 30 IN K833H

## (undated) DEVICE — GAUZE SPONGES,16 PLY: Brand: CURITY

## (undated) DEVICE — TIP COVER ACCESSORY

## (undated) DEVICE — INTENDED FOR TISSUE SEPARATION, AND OTHER PROCEDURES THAT REQUIRE A SHARP SURGICAL BLADE TO PUNCTURE OR CUT.: Brand: BARD-PARKER SAFETY BLADES SIZE 11, STERILE

## (undated) DEVICE — PLUMEPEN PRO 10FT

## (undated) DEVICE — SURGICEL 4 X 8

## (undated) DEVICE — SUT ETHILON 4-0 PS-2 18 IN 1667H

## (undated) DEVICE — ENDOPATH XCEL BLADELESS TROCARS WITH STABILITY SLEEVES: Brand: ENDOPATH XCEL

## (undated) DEVICE — TRAY FOLEY 16FR URIMETER SURESTEP

## (undated) DEVICE — MONOPOLAR CURVED SCISSORS: Brand: ENDOWRIST

## (undated) DEVICE — GLOVE SRG BIOGEL 7

## (undated) DEVICE — PENCIL ELECTROSURG E-Z CLEAN -0035H

## (undated) DEVICE — SUT VICRYL 3-0 PS-2 27 IN J427H

## (undated) DEVICE — ENDOPOUCH RETRIEVER SPECIMEN RETRIEVAL BAGS: Brand: ENDOPOUCH RETRIEVER

## (undated) DEVICE — GLOVE SRG BIOGEL 7.5

## (undated) DEVICE — TOWEL SET X-RAY

## (undated) DEVICE — LARGE NEEDLE DRIVER: Brand: ENDOWRIST

## (undated) DEVICE — Device

## (undated) DEVICE — ASTOUND STANDARD SURGICAL GOWN, XL: Brand: CONVERTORS

## (undated) DEVICE — CATH FOLEY 20FR 30ML 2 WAY SILICONE-ELASTIMER

## (undated) DEVICE — BLUE HEAT SCOPE WARMER

## (undated) DEVICE — LUBRICANT SURGILUBE TUBE 4 OZ  FLIP TOP

## (undated) DEVICE — 22.5 MM X 6.9 MM X 0.53 MM SAGITTAL BLADE

## (undated) DEVICE — GLOVE INDICATOR PI UNDERGLOVE SZ 6.5 BLUE

## (undated) DEVICE — BETHLEHEM UNIVERSAL MINOR GEN: Brand: CARDINAL HEALTH

## (undated) DEVICE — URIMETER 2500ML

## (undated) DEVICE — BIPOLAR GRASPER, LONG: Brand: ENDOWRIST

## (undated) DEVICE — 3M™ DURAPORE™ SURGICAL TAPE 1538-3, 3 INCH X 10 YARD (7,5CM X 9,1M), 4 ROLLS/BOX: Brand: 3M™ DURAPORE™

## (undated) DEVICE — GLOVE SRG BIOGEL 6.5

## (undated) DEVICE — BURR OVAL 4 MM C0901

## (undated) DEVICE — BETHLEHEM UNIVERSAL  MIONR EXT: Brand: CARDINAL HEALTH

## (undated) DEVICE — CANNULA SEAL

## (undated) DEVICE — TROCAR: Brand: KII SLEEVE

## (undated) DEVICE — 3M™ STERI-STRIP™ COMPOUND BENZOIN TINCTURE 40 BAGS/CARTON 4 CARTONS/CASE C1544: Brand: 3M™ STERI-STRIP™

## (undated) DEVICE — HARMONIC ACE 5MM DIAMETER SHEARS 36CM SHAFT LENGTH + ADAPTIVE TISSUE TECHNOLOGY FOR USE WITH GENERATOR G11: Brand: HARMONIC ACE

## (undated) DEVICE — ENDOPATH PNEUMONEEDLE INSUFFLATION NEEDLES WITH LUER LOCK CONNECTORS 120MM: Brand: ENDOPATH

## (undated) DEVICE — ALLENTOWN LAP CHOLE APP PACK: Brand: CARDINAL HEALTH

## (undated) DEVICE — CURITY NON-ADHERENT STRIPS: Brand: CURITY

## (undated) DEVICE — SUT VICRYL 3-0 SH 27 IN J416H

## (undated) DEVICE — STRETCH BANDAGE: Brand: CURITY

## (undated) DEVICE — SUT VICRYL 0 CT-1 27 IN J260H